# Patient Record
Sex: FEMALE | Race: WHITE | NOT HISPANIC OR LATINO | Employment: STUDENT | URBAN - METROPOLITAN AREA
[De-identification: names, ages, dates, MRNs, and addresses within clinical notes are randomized per-mention and may not be internally consistent; named-entity substitution may affect disease eponyms.]

---

## 2017-10-04 ENCOUNTER — HOSPITAL ENCOUNTER (OUTPATIENT)
Dept: LAB | Facility: MEDICAL CENTER | Age: 22
End: 2017-10-04
Payer: COMMERCIAL

## 2017-10-04 LAB
ALBUMIN SERPL BCP-MCNC: 4.2 G/DL (ref 3.2–4.9)
ALBUMIN/GLOB SERPL: 1.3 G/DL
ALP SERPL-CCNC: 33 U/L (ref 30–99)
ALT SERPL-CCNC: 7 U/L (ref 2–50)
ANION GAP SERPL CALC-SCNC: 10 MMOL/L (ref 0–11.9)
AST SERPL-CCNC: 12 U/L (ref 12–45)
BASOPHILS # BLD AUTO: 0.6 % (ref 0–1.8)
BASOPHILS # BLD: 0.03 K/UL (ref 0–0.12)
BILIRUB SERPL-MCNC: 0.5 MG/DL (ref 0.1–1.5)
BUN SERPL-MCNC: 10 MG/DL (ref 8–22)
CALCIUM SERPL-MCNC: 9.6 MG/DL (ref 8.5–10.5)
CHLORIDE SERPL-SCNC: 107 MMOL/L (ref 96–112)
CO2 SERPL-SCNC: 21 MMOL/L (ref 20–33)
CREAT SERPL-MCNC: 0.62 MG/DL (ref 0.5–1.4)
EOSINOPHIL # BLD AUTO: 0.16 K/UL (ref 0–0.51)
EOSINOPHIL NFR BLD: 3.2 % (ref 0–6.9)
ERYTHROCYTE [DISTWIDTH] IN BLOOD BY AUTOMATED COUNT: 43 FL (ref 35.9–50)
GFR SERPL CREATININE-BSD FRML MDRD: >60 ML/MIN/1.73 M 2
GLOBULIN SER CALC-MCNC: 3.2 G/DL (ref 1.9–3.5)
GLUCOSE SERPL-MCNC: 90 MG/DL (ref 65–99)
HCT VFR BLD AUTO: 43 % (ref 37–47)
HGB BLD-MCNC: 14.2 G/DL (ref 12–16)
IMM GRANULOCYTES # BLD AUTO: 0.01 K/UL (ref 0–0.11)
IMM GRANULOCYTES NFR BLD AUTO: 0.2 % (ref 0–0.9)
LYMPHOCYTES # BLD AUTO: 2.34 K/UL (ref 1–4.8)
LYMPHOCYTES NFR BLD: 47.1 % (ref 22–41)
MCH RBC QN AUTO: 29.6 PG (ref 27–33)
MCHC RBC AUTO-ENTMCNC: 33 G/DL (ref 33.6–35)
MCV RBC AUTO: 89.8 FL (ref 81.4–97.8)
MONOCYTES # BLD AUTO: 0.46 K/UL (ref 0–0.85)
MONOCYTES NFR BLD AUTO: 9.3 % (ref 0–13.4)
NEUTROPHILS # BLD AUTO: 1.97 K/UL (ref 2–7.15)
NEUTROPHILS NFR BLD: 39.6 % (ref 44–72)
NRBC # BLD AUTO: 0 K/UL
NRBC BLD AUTO-RTO: 0 /100 WBC
PLATELET # BLD AUTO: 241 K/UL (ref 164–446)
PMV BLD AUTO: 11.5 FL (ref 9–12.9)
POTASSIUM SERPL-SCNC: 3.7 MMOL/L (ref 3.6–5.5)
PROT SERPL-MCNC: 7.4 G/DL (ref 6–8.2)
RBC # BLD AUTO: 4.79 M/UL (ref 4.2–5.4)
SODIUM SERPL-SCNC: 138 MMOL/L (ref 135–145)
TSH SERPL DL<=0.005 MIU/L-ACNC: 2.35 UIU/ML (ref 0.3–3.7)
WBC # BLD AUTO: 5 K/UL (ref 4.8–10.8)

## 2017-10-04 PROCEDURE — 84443 ASSAY THYROID STIM HORMONE: CPT

## 2017-10-04 PROCEDURE — 36415 COLL VENOUS BLD VENIPUNCTURE: CPT

## 2017-10-04 PROCEDURE — 80053 COMPREHEN METABOLIC PANEL: CPT

## 2017-10-04 PROCEDURE — 85025 COMPLETE CBC W/AUTO DIFF WBC: CPT

## 2017-10-09 ENCOUNTER — OFFICE VISIT (OUTPATIENT)
Dept: MEDICAL GROUP | Facility: MEDICAL CENTER | Age: 22
End: 2017-10-09
Payer: COMMERCIAL

## 2017-10-09 VITALS
HEIGHT: 69 IN | SYSTOLIC BLOOD PRESSURE: 110 MMHG | DIASTOLIC BLOOD PRESSURE: 68 MMHG | OXYGEN SATURATION: 99 % | HEART RATE: 66 BPM | BODY MASS INDEX: 16.44 KG/M2 | WEIGHT: 111 LBS | RESPIRATION RATE: 16 BRPM | TEMPERATURE: 98.5 F

## 2017-10-09 DIAGNOSIS — L65.9 HAIR LOSS: ICD-10-CM

## 2017-10-09 DIAGNOSIS — B07.0 PLANTAR WART OF LEFT FOOT: ICD-10-CM

## 2017-10-09 DIAGNOSIS — Z91.09 ENVIRONMENTAL ALLERGIES: ICD-10-CM

## 2017-10-09 DIAGNOSIS — R42 POSTURAL DIZZINESS WITH NEAR SYNCOPE: Primary | ICD-10-CM

## 2017-10-09 DIAGNOSIS — R55 POSTURAL DIZZINESS WITH NEAR SYNCOPE: Primary | ICD-10-CM

## 2017-10-09 DIAGNOSIS — F90.2 ATTENTION DEFICIT HYPERACTIVITY DISORDER (ADHD), COMBINED TYPE: ICD-10-CM

## 2017-10-09 DIAGNOSIS — R29.91 MARFANOID HABITUS: ICD-10-CM

## 2017-10-09 DIAGNOSIS — I49.1 PAC (PREMATURE ATRIAL CONTRACTION): ICD-10-CM

## 2017-10-09 DIAGNOSIS — Z71.85 VACCINE COUNSELING: ICD-10-CM

## 2017-10-09 PROCEDURE — 90651 9VHPV VACCINE 2/3 DOSE IM: CPT | Performed by: FAMILY MEDICINE

## 2017-10-09 PROCEDURE — 93000 ELECTROCARDIOGRAM COMPLETE: CPT | Performed by: FAMILY MEDICINE

## 2017-10-09 PROCEDURE — 90471 IMMUNIZATION ADMIN: CPT | Performed by: FAMILY MEDICINE

## 2017-10-09 PROCEDURE — 99214 OFFICE O/P EST MOD 30 MIN: CPT | Mod: 25 | Performed by: FAMILY MEDICINE

## 2017-10-09 RX ORDER — DEXTROAMPHETAMINE SACCHARATE, AMPHETAMINE ASPARTATE, DEXTROAMPHETAMINE SULFATE AND AMPHETAMINE SULFATE 2.5; 2.5; 2.5; 2.5 MG/1; MG/1; MG/1; MG/1
5 TABLET ORAL 2 TIMES DAILY
Qty: 60 TAB | Refills: 0 | Status: SHIPPED | OUTPATIENT
Start: 2017-11-09 | End: 2017-10-09 | Stop reason: SDUPTHER

## 2017-10-09 RX ORDER — DEXTROAMPHETAMINE SACCHARATE, AMPHETAMINE ASPARTATE, DEXTROAMPHETAMINE SULFATE AND AMPHETAMINE SULFATE 2.5; 2.5; 2.5; 2.5 MG/1; MG/1; MG/1; MG/1
5 TABLET ORAL 2 TIMES DAILY
Qty: 60 TAB | Refills: 0 | Status: SHIPPED | OUTPATIENT
Start: 2017-10-09 | End: 2017-10-09 | Stop reason: SDUPTHER

## 2017-10-09 RX ORDER — FLUTICASONE PROPIONATE 50 MCG
1 SPRAY, SUSPENSION (ML) NASAL DAILY
Qty: 16 G | Refills: 3 | Status: SHIPPED | OUTPATIENT
Start: 2017-10-09 | End: 2017-11-13

## 2017-10-09 RX ORDER — DEXTROAMPHETAMINE SACCHARATE, AMPHETAMINE ASPARTATE, DEXTROAMPHETAMINE SULFATE AND AMPHETAMINE SULFATE 2.5; 2.5; 2.5; 2.5 MG/1; MG/1; MG/1; MG/1
5 TABLET ORAL 2 TIMES DAILY
Qty: 60 TAB | Refills: 0 | Status: SHIPPED | OUTPATIENT
Start: 2017-12-09 | End: 2017-11-13 | Stop reason: SDUPTHER

## 2017-10-09 RX ORDER — NORETHINDRONE ACETATE AND ETHINYL ESTRADIOL .02; 1 MG/1; MG/1
1 TABLET ORAL DAILY
COMMUNITY
End: 2018-01-08 | Stop reason: SDUPTHER

## 2017-10-09 ASSESSMENT — PATIENT HEALTH QUESTIONNAIRE - PHQ9
CLINICAL INTERPRETATION OF PHQ2 SCORE: 2
5. POOR APPETITE OR OVEREATING: 2 - MORE THAN HALF THE DAYS
SUM OF ALL RESPONSES TO PHQ QUESTIONS 1-9: 12

## 2017-10-10 NOTE — PROGRESS NOTES
"This medical record contains text that has been entered with the assistance of computer voice recognition and dictation software.  Therefore, it may contain unintended errors in text, spelling, punctuation, or grammar        Chief Complaint   Patient presents with   • Establish Care     Dizziness with SOB x 1-2 yrs off/on but for the past 3-4 mo worsend        Pt. with HX: Syncope about 1-2 mo ago   • Other     Will need clearancy for a surgery scheudle 12/2017.   Pt. will undergo a skene gland cyst removal by Dr. Phani Vanessa Brett Rey is a 21 y.o. 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  Mostly because job is understaffed and she has been working more than full time hours.  She was recently promoted to part time so now she will have PTO and benefits   Lives with roommate  She has a girlfriend x 2 mo  She has a dog  She is contemplative of starting yoga      She has had a problem with \"frequent presyncope\" her whole life starting at age 12 but has been coming more frequent recently.  Will get presyncope symptoms of nausea and light headedness tunnel vision and will resolve her symptoms by drinking fluids electrolytes and sitting down to rest.  She has these symptoms a lot with blood draw.  She has these symptoms she stands up to fast or when she has been \"at the mall shopping all day and not drinking enough water or eating enough\"  Ros  No chest pain   No palpitations  No lower extremity sweling   No difficulty breathing   No true syncope  No headaches  No focal neuro symptoms      Current Outpatient Prescriptions   Medication Sig Dispense Refill   • norethindrone-ethinyl estradiol (JUNEL 1/20) 1-20 MG-MCG per tablet Take 1 Tab by mouth every " "day.     • [START ON 12/9/2017] amphetamine-dextroamphetamine (ADDERALL) 10 MG Tab Take 0.5 Tabs by mouth 2 times a day. 60 Tab 0   • fluticasone (FLONASE) 50 MCG/ACT nasal spray Spray 1 Spray in nose every day. 16 g 3   • albuterol (VENTOLIN OR PROVENTIL) 108 (90 BASE) MCG/ACT AERS inhalation aerosol Inhale 2 Puffs by mouth every four hours as needed. 1 Inhaler 0   • fluticasone (FLONASE) 50 MCG/ACT nasal spray Spray 1 Spray in nose every day. 16 g 1   • acetaminophen (TYLENOL) 500 MG TABS Take 500-1,000 mg by mouth every 6 hours as needed.       No current facility-administered medications for this visit.      Patient Active Problem List    Diagnosis Date Noted   • Hair loss 10/09/2017   • Postural dizziness with near syncope 10/09/2017   • Marfanoid habitus 10/09/2017   • Attention deficit hyperactivity disorder (ADHD), combined type 10/09/2017   • Environmental allergies 10/09/2017   • Plantar wart of left foot 10/09/2017   • PAC (premature atrial contraction) 10/09/2017     No past surgical history on file.   Social History   Substance Use Topics   • Smoking status: Current Every Day Smoker     Packs/day: 0.25     Years: 1.00     Types: Cigarettes   • Smokeless tobacco: Never Used   • Alcohol use Yes      Comment: Occ     Family History   Problem Relation Age of Onset   • Cancer Mother      Thyroid CA   • Hypertension Father            ROS      All other systems reviewed and are negative     Objective:     Blood pressure 110/68, pulse 66, temperature 36.9 °C (98.5 °F), resp. rate 16, height 1.759 m (5' 9.25\"), weight 50.3 kg (111 lb), last menstrual period 07/09/2017, SpO2 99 %. Body mass index is 16.27 kg/m².  Physical Exam:    Constitutional: Alert, no distress.  Skin: Warm, dry, good turgor, no rashes in visible areas.  Eye: Equal, round and reactive, conjunctiva clear, lids normal.  ENMT: Lips without lesions, good dentition, oropharynx clear.  Neck: Trachea midline, no masses, no thyromegaly. No cervical " or supraclavicular lymphadenopathy.  Respiratory: Unlabored respiratory effort, lungs clear to auscultation, no wheezes, no ronchi.  Cardiovascular: Normal S1, S2, no murmur, no edema.  Abdomen: Soft, non-tender, no masses, no hepatosplenomegaly.  Psych: Alert and oriented x3, normal affect and mood.  Neuro:   CN 2-12 ITBL, no focal facial asymmetries, strength 5/5 in all four extremities, nl and symmetric reflex all four extremities, sensation grossly intact throughout.  Cerebellar testing wnl.  Gait steady and normal.      EKG  Per my READ NSR, no e/o acute ischemia, nl AR interval and good conduction. Single PAC        Assessment and Plan:   The following treatment plan was discussed        Problem List Items Addressed This Visit     Hair loss     Just had complete nl set of labs with ob/gyn,  I explain in the setting where we do not identify an organic cause we find that stress is the #1 contributor.  She has had a lot of stress in her life recently with work.  She has a history of anxiety disorder and panic attack and would like to avoid medication.  I encourage regular exercise (yoga) and she is working towards a better work life balance. Switching from [employer] to part time             Relevant Orders    BASIC METABOLIC PANEL    CBC WITH DIFFERENTIAL    IRON/TOTAL IRON BIND    TSH+FREE T4    Postural dizziness with near syncope - Primary     No red flags.  Pt is having 10 year history of near syncope which seems to be worse with prolonged standing,  fasting, severe emotion, or pain.  I suspect simple vasovagal near syncope as the cause and we discuss preventative measures.  Since she has marfanoid habitus we will do complete work up with labs (states she just had nl labs with ob) EKG today in clinic and echo.           Relevant Orders    ECHOCARDIOGRAM COMP W/O CONT    Marfanoid habitus    Relevant Orders    ECHOCARDIOGRAM COMP W/O CONT    BASIC METABOLIC PANEL    Attention deficit hyperactivity disorder  (ADHD), combined type     She has had success with adderall in the past and would like to resume, risk benefit side effect discussed.  I encourage her to limit to 5 days per week for weekend holiday to avoid tolerance.  Follow up q3 mo as this is controlled substance.                 Environmental allergies     Poorly controlled, uses antiH prn triggers are fall pollens, molds, dust mites and cats. Start flonase         Plantar wart of left foot    Relevant Orders    REFERRAL TO PODIATRY    PAC (premature atrial contraction)     See postural dizziness with near syncope  She is getting echo  Follow up 1 mo                  Other Visit Diagnoses     Vaccine counseling        Relevant Orders    GARDASIL 9            HEALTH MAINTENANCE: HPV #1 today, had flu shot already     Instructed to follow up if symptoms worsen or fail to improve, ER/UC precautions discussed as well    Followup: No Follow-up on file.      Over 45 minutes spent with patient face to face, greater than 50% time spent with plan/cordination of care       Kathe Dunaway MD  [facility], Family Medicine   [address] [place] [address]   [place] [id number]  Phone: [phone]

## 2017-10-10 NOTE — ASSESSMENT & PLAN NOTE
She has had success with adderall in the past and would like to resume, risk benefit side effect discussed.  I encourage her to limit to 5 days per week for weekend holiday to avoid tolerance.  Follow up q3 mo as this is controlled substance.

## 2017-10-10 NOTE — ASSESSMENT & PLAN NOTE
No red flags.  Pt is having 10 year history of near syncope which seems to be worse with prolonged standing,  fasting, severe emotion, or pain.  I suspect simple vasovagal near syncope as the cause and we discuss preventative measures.  Since she has marfanoid habitus we will do complete work up with labs (states she just had nl labs with ob) EKG today in clinic and echo.

## 2017-10-10 NOTE — ASSESSMENT & PLAN NOTE
Just had complete nl set of labs with ob/gyn,  I explain in the setting where we do not identify an organic cause we find that stress is the #1 contributor.  She has had a lot of stress in her life recently with work.  She has a history of anxiety disorder and panic attack and would like to avoid medication.  I encourage regular exercise (yoga) and she is working towards a better work life balance. Switching from per [employer] to part time

## 2017-10-10 NOTE — ASSESSMENT & PLAN NOTE
Poorly controlled, uses antiH prn triggers are fall pollens, molds, dust mites and cats. Start flonase

## 2017-10-12 ENCOUNTER — HOSPITAL ENCOUNTER (OUTPATIENT)
Dept: CARDIOLOGY | Facility: MEDICAL CENTER | Age: 22
End: 2017-10-12
Attending: FAMILY MEDICINE
Payer: COMMERCIAL

## 2017-10-12 LAB
LV EJECT FRACT  99904: 65
LV EJECT FRACT MOD 2C 99903: 73.66
LV EJECT FRACT MOD 4C 99902: 56.41
LV EJECT FRACT MOD BP 99901: 67.11

## 2017-10-12 PROCEDURE — 93306 TTE W/DOPPLER COMPLETE: CPT | Mod: 26 | Performed by: INTERNAL MEDICINE

## 2017-10-12 PROCEDURE — 93306 TTE W/DOPPLER COMPLETE: CPT

## 2017-10-13 ENCOUNTER — TELEPHONE (OUTPATIENT)
Dept: MEDICAL GROUP | Facility: MEDICAL CENTER | Age: 22
End: 2017-10-13

## 2017-10-13 NOTE — TELEPHONE ENCOUNTER
----- Message from Kathe Dunaway M.D. sent at 10/13/2017 10:53 AM PDT -----  please let [facility] know that I reviewed her echo and her heart looks FANTASTIC! No change in management plan, liberalize salt in diet, adequate hydration, regular exercise, regular sleep, small regular meals

## 2017-11-06 ENCOUNTER — TELEPHONE (OUTPATIENT)
Dept: MEDICAL GROUP | Facility: MEDICAL CENTER | Age: 22
End: 2017-11-06

## 2017-11-06 DIAGNOSIS — R68.84 JAW PAIN: ICD-10-CM

## 2017-11-06 NOTE — TELEPHONE ENCOUNTER
----- Message from Katharina Pelayo Med Ass't sent at 11/6/2017  1:02 PM PST -----  Regarding: FW: wisdom teeth out      ----- Message -----  From: Katharina Pelayo Med Ass't  Sent: 11/6/2017   1:00 PM  To: Katharina Pelayo Med Ass't  Subject: wisdom teeth out                                 Dr. Dunaway    Could you see pt in order to determine if she needs to get her wisdom teeth out or should she just see her Dentist?  Pt prefers to see ya first.       Katharina :-)

## 2017-11-06 NOTE — TELEPHONE ENCOUNTER
We can do panoramic mandible  x ray to see if she has wisdom teeth, I ordered x ray,  pt to schedule appointment with me to review X ray

## 2017-11-13 ENCOUNTER — OFFICE VISIT (OUTPATIENT)
Dept: MEDICAL GROUP | Facility: MEDICAL CENTER | Age: 22
End: 2017-11-13
Payer: COMMERCIAL

## 2017-11-13 VITALS
BODY MASS INDEX: 15.93 KG/M2 | OXYGEN SATURATION: 100 % | TEMPERATURE: 99.8 F | RESPIRATION RATE: 16 BRPM | WEIGHT: 107.58 LBS | SYSTOLIC BLOOD PRESSURE: 100 MMHG | HEART RATE: 89 BPM | DIASTOLIC BLOOD PRESSURE: 60 MMHG | HEIGHT: 69 IN

## 2017-11-13 DIAGNOSIS — F50.02 ANOREXIA NERVOSA WITH BULIMIA: ICD-10-CM

## 2017-11-13 DIAGNOSIS — F41.9 ANXIETY: ICD-10-CM

## 2017-11-13 DIAGNOSIS — R55 POSTURAL DIZZINESS WITH NEAR SYNCOPE: ICD-10-CM

## 2017-11-13 DIAGNOSIS — L21.9 SEBORRHEIC DERMATITIS OF SCALP: ICD-10-CM

## 2017-11-13 DIAGNOSIS — R42 POSTURAL DIZZINESS WITH NEAR SYNCOPE: ICD-10-CM

## 2017-11-13 DIAGNOSIS — R11.0 NAUSEA: Primary | ICD-10-CM

## 2017-11-13 DIAGNOSIS — F33.2 SEVERE EPISODE OF RECURRENT MAJOR DEPRESSIVE DISORDER, WITHOUT PSYCHOTIC FEATURES (HCC): ICD-10-CM

## 2017-11-13 PROBLEM — F50.029 ANOREXIA NERVOSA WITH BULIMIA: Status: ACTIVE | Noted: 2017-11-13

## 2017-11-13 PROBLEM — R29.91 MARFANOID HABITUS: Status: RESOLVED | Noted: 2017-10-09 | Resolved: 2017-11-13

## 2017-11-13 PROCEDURE — 90471 IMMUNIZATION ADMIN: CPT | Performed by: FAMILY MEDICINE

## 2017-11-13 PROCEDURE — 99214 OFFICE O/P EST MOD 30 MIN: CPT | Mod: 25 | Performed by: FAMILY MEDICINE

## 2017-11-13 PROCEDURE — 90651 9VHPV VACCINE 2/3 DOSE IM: CPT | Performed by: FAMILY MEDICINE

## 2017-11-13 RX ORDER — CALCIUM CARBONATE 500 MG/1
500 TABLET, CHEWABLE ORAL DAILY
Qty: 30 TAB | Refills: 3 | Status: SHIPPED | OUTPATIENT
Start: 2017-11-13 | End: 2017-12-12

## 2017-11-13 RX ORDER — DEXTROAMPHETAMINE SACCHARATE, AMPHETAMINE ASPARTATE, DEXTROAMPHETAMINE SULFATE AND AMPHETAMINE SULFATE 2.5; 2.5; 2.5; 2.5 MG/1; MG/1; MG/1; MG/1
5 TABLET ORAL 2 TIMES DAILY
Qty: 60 TAB | Refills: 0 | Status: SHIPPED | OUTPATIENT
Start: 2017-12-09 | End: 2017-11-13

## 2017-11-13 RX ORDER — OMEPRAZOLE 20 MG/1
20 CAPSULE, DELAYED RELEASE ORAL DAILY
Qty: 30 CAP | Refills: 3 | Status: ON HOLD | OUTPATIENT
Start: 2017-11-13 | End: 2017-12-13

## 2017-11-13 ASSESSMENT — PATIENT HEALTH QUESTIONNAIRE - PHQ9
SUM OF ALL RESPONSES TO PHQ QUESTIONS 1-9: 17
CLINICAL INTERPRETATION OF PHQ2 SCORE: 4
5. POOR APPETITE OR OVEREATING: 3 - NEARLY EVERY DAY

## 2017-11-13 NOTE — LETTER
November 13, 2017       Patient: St. Kelsea Rey   YOB: 1995   Date of Visit: 11/13/2017         To Whom It May Concern:    Cleared from work 12/13/2017-12/20/2017.      If you have any questions or concerns, please don't hesitate to call [phone]          Sincerely,          Kathe Dunaway M.D.  Electronically Signed

## 2017-11-14 NOTE — ASSESSMENT & PLAN NOTE
See disordered eating  Treating with SSRI and referral to behavioral health for CBT  Follow up 2 weeks  Risk/benefit side effect of zoloft discussed

## 2017-11-14 NOTE — ASSESSMENT & PLAN NOTE
Consider gastritis 2/2 stress  (mom with cancer relapse - thyroid- going to [facility], she is also working over time)   Will exclude organic cause H pylori and do ppi trial

## 2017-11-14 NOTE — PROGRESS NOTES
This medical record contains text that has been entered with the assistance of computer voice recognition and dictation software.  Therefore, it may contain unintended errors in text, spelling, punctuation, or grammar        Chief Complaint   Patient presents with   • Dental Pain     lower bilat jaw tooth pressure like sensation   • Other     Discuss medications.       St. Kelsea Rey is a 21 y.o. female here evaluation and management of: weight loss, mental health, medication questions       HPI:     See assessment plan  This is our second encounter with pt   nausea x 1 mo, will restrict for days, and then will have occasional episodes of binge/purge   Read about mirtazapine for weight gain   After some discussion: history of disordered eating and mental health   Also with depression and anxiety and compulsion     Current Outpatient Prescriptions   Medication Sig Dispense Refill   • omeprazole (PRILOSEC) 20 MG delayed-release capsule Take 1 Cap by mouth every day. 30 Cap 3   • calcium carbonate (TUMS) 500 MG Chew Tab Take 1 Tab by mouth every day. 30 Tab 3   • sertraline (ZOLOFT) 50 MG Tab Take 1 Tab by mouth every day. 30 Tab 11   • norethindrone-ethinyl estradiol (JUNEL 1/20) 1-20 MG-MCG per tablet Take 1 Tab by mouth every day.     • albuterol (VENTOLIN OR PROVENTIL) 108 (90 BASE) MCG/ACT AERS inhalation aerosol Inhale 2 Puffs by mouth every four hours as needed. 1 Inhaler 0   • fluticasone (FLONASE) 50 MCG/ACT nasal spray Spray 1 Spray in nose every day. 16 g 1   • acetaminophen (TYLENOL) 500 MG TABS Take 500-1,000 mg by mouth every 6 hours as needed.       No current facility-administered medications for this visit.      Patient Active Problem List    Diagnosis Date Noted   • Seborrheic dermatitis of scalp 11/13/2017   • Nausea 11/13/2017   • Anxiety 11/13/2017   • Severe episode of recurrent major depressive disorder, without psychotic features (CMS-HCC) 11/13/2017   • Anorexia nervosa with  "bulimia 11/13/2017   • Hair loss 10/09/2017   • Postural dizziness with near syncope 10/09/2017   • Attention deficit hyperactivity disorder (ADHD), combined type 10/09/2017   • Environmental allergies 10/09/2017   • Plantar wart of left foot 10/09/2017   • PAC (premature atrial contraction) 10/09/2017     No past surgical history on file.   Social History   Substance Use Topics   • Smoking status: Current Every Day Smoker     Packs/day: 0.25     Years: 1.00     Types: Cigarettes   • Smokeless tobacco: Never Used   • Alcohol use Yes      Comment: Occ     Family History   Problem Relation Age of Onset   • Cancer Mother      Thyroid CA   • Hypertension Father            ROS    See HPI  + nausea  + lack of hunger  Neg for chest pain, neg for headache, neg for syncope  All other systems reviewed and are negative     Objective:     Blood pressure 100/60, pulse 89, temperature 37.7 °C (99.8 °F), resp. rate 16, height 1.759 m (5' 9.25\"), weight 48.8 kg (107 lb 9.4 oz), SpO2 100 %. Body mass index is 15.77 kg/m².  Physical Exam:        GEN: comfortable, alert and oriented, well nourished, well developed, in no apparent distress   HEENT: NCAT, eyes: pupils equal and reactive, sclera white, EOMIT, good dentition  HEART: limbs warm and well perfused, regular rate, no JVD, no lower extremity edema  LUNGS: speaking in full sentences, not in apparent respiratory distress, no audible wheezes  MSK: normal tone and bulk, no swelling of the joints, gait steady and normal           Assessment and Plan:   The following treatment plan was discussed        Problem List Items Addressed This Visit     Postural dizziness with near syncope     Echo and labs unremarkable  Improved with stimulant supporting dx of pots, unfortunately, we are going to wean/use caution with adderall given her comorbidity of anorexia nervosa          Seborrheic dermatitis of scalp     Advise use otc anti-dandruff shampoo to treat the organic component  There is " also likely a psychosomatic component          Nausea - Primary     Consider gastritis 2/2 stress  (mom with cancer relapse - thyroid- going to [facility], she is also working over time)   Will exclude organic cause H pylori and do ppi trial                Relevant Medications    omeprazole (PRILOSEC) 20 MG delayed-release capsule    calcium carbonate (TUMS) 500 MG Chew Tab    Other Relevant Orders    H.PYLORI STOOL ANTIGEN    Anxiety    Relevant Medications    sertraline (ZOLOFT) 50 MG Tab    Other Relevant Orders    REFERRAL TO BEHAVIORAL HEALTH    Severe episode of recurrent major depressive disorder, without psychotic features (CMS-HCC)     See disordered eating  Treating with SSRI and referral to behavioral health for CBT  Follow up 2 weeks  Risk/benefit side effect of zoloft discussed               Relevant Medications    sertraline (ZOLOFT) 50 MG Tab    Other Relevant Orders    REFERRAL TO BEHAVIORAL HEALTH    Anorexia nervosa with bulimia     This was a problem for her in [employer] and high school, she has relapse with stress  She is interested in trial of CBT again (referral to behavioral health)  She is aware that stimmulent is appetite suppressant and contraindicated   She will wean off   Start SSRI  Follow up 2 weeks               Relevant Orders    REFERRAL TO BEHAVIORAL HEALTH    GARDASIL 9 (Completed)      Other Visit Diagnoses    None.         Over 25 minutes spent with patient face to face, greater than 50% time spent with plan/cordination of care         Instructed to follow up if symptoms worsen or fail to improve, ER/UC precautions discussed as well    Kathe Dunaway MD  [facility], Family Medicine   [address] [place], [place] [address]   [place] [address]  Phone: [phone]

## 2017-11-14 NOTE — ASSESSMENT & PLAN NOTE
Echo and labs unremarkable  Improved with stimulant supporting dx of pots, unfortunately, we are going to wean/use caution with adderall given her comorbidity of anorexia nervosa

## 2017-11-14 NOTE — ASSESSMENT & PLAN NOTE
Advise use otc anti-dandruff shampoo to treat the organic component  There is also likely a psychosomatic component

## 2017-11-14 NOTE — ASSESSMENT & PLAN NOTE
This was a problem for her in [employer] and high school, she has relapse with stress  She is interested in trial of CBT again (referral to behavioral health)  She is aware that stimmulent is appetite suppressant and contraindicated   She will wean off   Start SSRI  Follow up 2 weeks

## 2017-12-12 DIAGNOSIS — Z01.812 PRE-OPERATIVE LABORATORY EXAMINATION: ICD-10-CM

## 2017-12-12 LAB
BASOPHILS # BLD AUTO: 0.6 % (ref 0–1.8)
BASOPHILS # BLD: 0.03 K/UL (ref 0–0.12)
EOSINOPHIL # BLD AUTO: 0.15 K/UL (ref 0–0.51)
EOSINOPHIL NFR BLD: 2.9 % (ref 0–6.9)
ERYTHROCYTE [DISTWIDTH] IN BLOOD BY AUTOMATED COUNT: 42.5 FL (ref 35.9–50)
HCG SERPL QL: NEGATIVE
HCT VFR BLD AUTO: 43.4 % (ref 37–47)
HGB BLD-MCNC: 14.5 G/DL (ref 12–16)
IMM GRANULOCYTES # BLD AUTO: 0.01 K/UL (ref 0–0.11)
IMM GRANULOCYTES NFR BLD AUTO: 0.2 % (ref 0–0.9)
LYMPHOCYTES # BLD AUTO: 2.46 K/UL (ref 1–4.8)
LYMPHOCYTES NFR BLD: 47.3 % (ref 22–41)
MCH RBC QN AUTO: 29.8 PG (ref 27–33)
MCHC RBC AUTO-ENTMCNC: 33.4 G/DL (ref 33.6–35)
MCV RBC AUTO: 89.3 FL (ref 81.4–97.8)
MONOCYTES # BLD AUTO: 0.42 K/UL (ref 0–0.85)
MONOCYTES NFR BLD AUTO: 8.1 % (ref 0–13.4)
NEUTROPHILS # BLD AUTO: 2.13 K/UL (ref 2–7.15)
NEUTROPHILS NFR BLD: 40.9 % (ref 44–72)
NRBC # BLD AUTO: 0 K/UL
NRBC BLD AUTO-RTO: 0 /100 WBC
PLATELET # BLD AUTO: 253 K/UL (ref 164–446)
PMV BLD AUTO: 11.4 FL (ref 9–12.9)
RBC # BLD AUTO: 4.86 M/UL (ref 4.2–5.4)
WBC # BLD AUTO: 5.2 K/UL (ref 4.8–10.8)

## 2017-12-12 PROCEDURE — 84703 CHORIONIC GONADOTROPIN ASSAY: CPT

## 2017-12-12 PROCEDURE — 85025 COMPLETE CBC W/AUTO DIFF WBC: CPT

## 2017-12-12 PROCEDURE — 36415 COLL VENOUS BLD VENIPUNCTURE: CPT

## 2017-12-13 ENCOUNTER — HOSPITAL ENCOUNTER (OUTPATIENT)
Facility: MEDICAL CENTER | Age: 22
End: 2017-12-13
Attending: OBSTETRICS & GYNECOLOGY | Admitting: OBSTETRICS & GYNECOLOGY
Payer: COMMERCIAL

## 2017-12-13 VITALS
TEMPERATURE: 97.6 F | HEIGHT: 70 IN | DIASTOLIC BLOOD PRESSURE: 59 MMHG | BODY MASS INDEX: 14.99 KG/M2 | SYSTOLIC BLOOD PRESSURE: 104 MMHG | RESPIRATION RATE: 18 BRPM | HEART RATE: 106 BPM | OXYGEN SATURATION: 98 % | WEIGHT: 104.72 LBS

## 2017-12-13 PROCEDURE — 160009 HCHG ANES TIME/MIN: Performed by: OBSTETRICS & GYNECOLOGY

## 2017-12-13 PROCEDURE — 160035 HCHG PACU - 1ST 60 MINS PHASE I: Performed by: OBSTETRICS & GYNECOLOGY

## 2017-12-13 PROCEDURE — A9270 NON-COVERED ITEM OR SERVICE: HCPCS

## 2017-12-13 PROCEDURE — 700101 HCHG RX REV CODE 250

## 2017-12-13 PROCEDURE — 700102 HCHG RX REV CODE 250 W/ 637 OVERRIDE(OP)

## 2017-12-13 PROCEDURE — 160047 HCHG PACU  - EA ADDL 30 MINS PHASE II: Performed by: OBSTETRICS & GYNECOLOGY

## 2017-12-13 PROCEDURE — 502587 HCHG PACK, D&C: Performed by: OBSTETRICS & GYNECOLOGY

## 2017-12-13 PROCEDURE — 700111 HCHG RX REV CODE 636 W/ 250 OVERRIDE (IP)

## 2017-12-13 PROCEDURE — 160039 HCHG SURGERY MINUTES - EA ADDL 1 MIN LEVEL 3: Performed by: OBSTETRICS & GYNECOLOGY

## 2017-12-13 PROCEDURE — 160028 HCHG SURGERY MINUTES - 1ST 30 MINS LEVEL 3: Performed by: OBSTETRICS & GYNECOLOGY

## 2017-12-13 PROCEDURE — 160036 HCHG PACU - EA ADDL 30 MINS PHASE I: Performed by: OBSTETRICS & GYNECOLOGY

## 2017-12-13 PROCEDURE — 501330 HCHG SET, CYSTO IRRIG TUBING: Performed by: OBSTETRICS & GYNECOLOGY

## 2017-12-13 PROCEDURE — 88305 TISSUE EXAM BY PATHOLOGIST: CPT

## 2017-12-13 PROCEDURE — A6404 STERILE GAUZE > 48 SQ IN: HCPCS | Performed by: OBSTETRICS & GYNECOLOGY

## 2017-12-13 PROCEDURE — 110454 HCHG SHELL REV 250: Performed by: OBSTETRICS & GYNECOLOGY

## 2017-12-13 PROCEDURE — 160048 HCHG OR STATISTICAL LEVEL 1-5: Performed by: OBSTETRICS & GYNECOLOGY

## 2017-12-13 PROCEDURE — 160025 RECOVERY II MINUTES (STATS): Performed by: OBSTETRICS & GYNECOLOGY

## 2017-12-13 PROCEDURE — A4346 CATH INDW FOLEY 3 WAY: HCPCS | Performed by: OBSTETRICS & GYNECOLOGY

## 2017-12-13 PROCEDURE — 160046 HCHG PACU - 1ST 60 MINS PHASE II: Performed by: OBSTETRICS & GYNECOLOGY

## 2017-12-13 PROCEDURE — 160002 HCHG RECOVERY MINUTES (STAT): Performed by: OBSTETRICS & GYNECOLOGY

## 2017-12-13 PROCEDURE — 501838 HCHG SUTURE GENERAL: Performed by: OBSTETRICS & GYNECOLOGY

## 2017-12-13 RX ORDER — LIDOCAINE HYDROCHLORIDE 10 MG/ML
INJECTION, SOLUTION INFILTRATION; PERINEURAL
Status: DISCONTINUED
Start: 2017-12-13 | End: 2017-12-13 | Stop reason: HOSPADM

## 2017-12-13 RX ORDER — LIDOCAINE AND PRILOCAINE 25; 25 MG/G; MG/G
1 CREAM TOPICAL
Status: DISCONTINUED | OUTPATIENT
Start: 2017-12-13 | End: 2017-12-13 | Stop reason: HOSPADM

## 2017-12-13 RX ORDER — ONDANSETRON 2 MG/ML
4 INJECTION INTRAMUSCULAR; INTRAVENOUS EVERY 6 HOURS PRN
Status: DISCONTINUED | OUTPATIENT
Start: 2017-12-13 | End: 2017-12-13 | Stop reason: HOSPADM

## 2017-12-13 RX ORDER — HYDROCODONE BITARTRATE AND ACETAMINOPHEN 5; 325 MG/1; MG/1
2 TABLET ORAL EVERY 6 HOURS PRN
Status: DISCONTINUED | OUTPATIENT
Start: 2017-12-13 | End: 2017-12-13 | Stop reason: HOSPADM

## 2017-12-13 RX ORDER — SODIUM CHLORIDE, SODIUM LACTATE, POTASSIUM CHLORIDE, CALCIUM CHLORIDE 600; 310; 30; 20 MG/100ML; MG/100ML; MG/100ML; MG/100ML
INJECTION, SOLUTION INTRAVENOUS CONTINUOUS
Status: DISCONTINUED | OUTPATIENT
Start: 2017-12-13 | End: 2017-12-13 | Stop reason: HOSPADM

## 2017-12-13 RX ORDER — LIDOCAINE HYDROCHLORIDE 10 MG/ML
0.5 INJECTION, SOLUTION INFILTRATION; PERINEURAL
Status: DISCONTINUED | OUTPATIENT
Start: 2017-12-13 | End: 2017-12-13 | Stop reason: HOSPADM

## 2017-12-13 RX ORDER — IBUPROFEN 200 MG
600 TABLET ORAL EVERY 6 HOURS PRN
Status: DISCONTINUED | OUTPATIENT
Start: 2017-12-13 | End: 2017-12-13 | Stop reason: HOSPADM

## 2017-12-13 RX ORDER — HYDROCODONE BITARTRATE AND ACETAMINOPHEN 5; 325 MG/1; MG/1
1 TABLET ORAL EVERY 4 HOURS PRN
Status: DISCONTINUED | OUTPATIENT
Start: 2017-12-13 | End: 2017-12-13 | Stop reason: HOSPADM

## 2017-12-13 RX ORDER — PROMETHAZINE HYDROCHLORIDE 25 MG/1
25 SUPPOSITORY RECTAL EVERY 4 HOURS PRN
Status: DISCONTINUED | OUTPATIENT
Start: 2017-12-13 | End: 2017-12-13 | Stop reason: HOSPADM

## 2017-12-13 RX ORDER — FLUTICASONE PROPIONATE 50 MCG
1 SPRAY, SUSPENSION (ML) NASAL
COMMUNITY
End: 2020-06-24 | Stop reason: SDUPTHER

## 2017-12-13 RX ORDER — BUPIVACAINE HYDROCHLORIDE AND EPINEPHRINE 2.5; 5 MG/ML; UG/ML
INJECTION, SOLUTION EPIDURAL; INFILTRATION; INTRACAUDAL; PERINEURAL
Status: DISCONTINUED | OUTPATIENT
Start: 2017-12-13 | End: 2017-12-13 | Stop reason: HOSPADM

## 2017-12-13 RX ADMIN — HYDROCODONE BITARTRATE AND ACETAMINOPHEN 15 ML: 2.5; 108 SOLUTION ORAL at 11:46

## 2017-12-13 ASSESSMENT — PAIN SCALES - GENERAL
PAINLEVEL_OUTOF10: 2
PAINLEVEL_OUTOF10: 0
PAINLEVEL_OUTOF10: 2
PAINLEVEL_OUTOF10: 4
PAINLEVEL_OUTOF10: 0

## 2017-12-13 NOTE — PROGRESS NOTES
Pt and pt's friend taught about leg bag and large bag, how to drain/clean/change. Pt c/o more pain, pt asked if she could have pain medication prior to d/c, pt educated about monitoring pt for at least 30 minutes post med administration, pt then declined pain medication. Pt's VSS, pt verbalized understanding of kunz care. Pt requested Dr. Jeronimo to call her, I called into the OR and left message with Audrey RODRIGUEZ to have Dr. Jeronimo give pt a call after her surgery.

## 2017-12-13 NOTE — OP REPORT
DATE OF SERVICE:  12/13/2017    PREOPERATIVE DIAGNOSES:  Presumed right [place]'s gland blockage and cyst,   frequent urinary tract infections, difficulty urinating with hesitancy.    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    PROCEDURE:  Removal of urethral diverticulum and closure and support of the   urethra distally, cystoscopy and urethroscopy exam under anesthesia.    SURGEON:  Luz Jeronimo MD    ASSISTANT:  COLLEEN Dye    ANESTHESIOLOGIST:  Marcelo Garcia MD    ANESTHESIA:  General endotracheal.    SPECIMEN:  Urethral cyst consistent with a diverticulum.    ESTIMATED BLOOD LOSS:  20 mL.    FINDINGS AT THE TIME OF SURGERY:  Exam under anesthesia reveals a 3 cm mass to   the right of the distal urethra without a connection recognized in the   urethra upon cystoscopy, but with dissection of the cystic mass, there was a   connection on the distal urethra on that right side.  The cyst was full of   sludge and possible watery fluid, consistent with urine in a diverticulum sac.    The cystoscopic findings postop was a normal bladder and normal urethra.    The connection was in the very distal urethra approximately 3 mm within the   urethral meatus.    COMPLICATIONS:  None.    TECHNIQUE:  The patient was taken to the operating room where general   anesthesia was placed.  She was then placed in the [facility] with   excellent positioning, prepped and draped in a normal sterile fashion.  The   cyst was identified and the vaginal mucosa overlying the cyst inside the   vagina was injected with 0.5% Marcaine with epinephrine and incision was made   with a scalpel.  The vaginal mucosa was dissected off of the cyst wall and   there appeared to be a connection on the medial aspect to the urethra.  I did   disconnect this and got into the urethra approximately 0.5 cm area that had   decent thickness to the  tissue around it.  I then cut the diverticulum off   leaving some tissue to support that urethral lift.  The specimen was sent as   [place]'s gland versus urethral diverticulum.  I then reapproximated the defect   in the distal urethra with 4-0 Vicryl and then did another layer on top of   this with 4-0 Vicryl as well.  There was also some periurethral tissue that I   was able to place together over this area as well, so in 3 layers, this area   was supported.  I then reapproximated the vaginal mucosa with 3-0 Vicryl in a   running fashion.  Excellent reapproximation was achieved.  I then looked again   with the cystoscope, the bladder appeared normal and the distal urethra was   unable to see any defect in this.  I then placed the Cleary catheter without   difficulty and leave this in for a week postoperatively.  The patient   tolerated the procedure very well and was brought to recovery room in stable   condition.       ____________________________________     MD MEE SALOMON / ERIC    DD:  12/13/2017 11:38:03  DT:  12/13/2017 11:56:59    D#:  [id number]  Job#:  [id number]

## 2017-12-13 NOTE — OR SURGEON
Immediate Post OP Note    PreOp Diagnosis: PRESUMED RIGHT SKENES GLAND, FREQUENT UTIS, DIFFICULTY URINATING WITH HESITANCY    PostOp Diagnosis: SAME WITH PROBABLE URETHRAL DIVERTICULUM INSTEAD OF A SKENES.    Procedure(s):  REMOVAL OF URETHRAL DIVERTICULUM AND CLOSURE AND SUPPORT OF URETHRA DISTALLY PERFORMED, CYSTOSCOPY AND URETHROSCOPY - Wound Class: Clean Contaminated    Surgeon(s):  Luz Jeronimo M.D.    Anesthesiologist/Type of Anesthesia:  Anesthesiologist: Marcelo Garcia M.D./General    Surgical Staff:  Assistant: Aron Ontiveros R.N.  Circulator: Cynthia Valdes R.N.  Relief Circulator: Rosalia Rodriguez R.N.  Scrub Person: Argenis Velázquez    Specimens:  Urethral cyst consistent with diverticulum    Estimated Blood Loss: 20cc    Findings: A 3 cm mass to the right of the distal urethra, no connection into the urethra was noted on cystoscopy but was with dissection of the cystic mass, the cyst was full of sulge and pus watery fluid consistent with urine, cystoscopic findings postop normal bladder and urethra,     Complications: None        12/13/2017 11:27 AM Luz Jeronimo

## 2017-12-13 NOTE — H&P
DATE OF OPERATION:  12/13/2017    CHIEF COMPLAINT:  Left Felt's gland with difficulty urinating.    HISTORY OF PRESENT ILLNESS:  The patient is a 22-year-old G0 who was seen at   [facility].  She was found to have a 3 cm cyst   consistent with a Felt's glands and the patient states that she feels like it   is getting bigger.  She gets lots of urinary tract infection.  She uses   cranberry pills as needed, sometimes has difficulty with her stream _____   hesitancy for the last several months.  Patient is interested in having this   removed.    PAST MEDICAL HISTORY:  Asthma, migraine headaches, heart problems and back   trouble, depression, anxiety.    PAST SURGICAL HISTORY:  None.    MENSTRUAL HISTORY:  The patient underwent menarche at age 14.  She has periods   every 4-6 months, last 2-18 days.    OBSTETRICAL HISTORY:  She has never been pregnant.    MEDICATIONS:  Include _____ and vitamin D.    FAMILY HISTORY:  Noncontributory.    SOCIAL HISTORY:  The patient smokes 1-5 cigarettes a day and has for 2 years.    She does not drink alcohol or do drugs.    ALLERGIES:  TO LATEX.    PHYSICAL EXAMINATION:  VITAL SIGNS:  The patient's blood pressure is 110/62.  Her weight is 110,   height is 5 feet 10 inches.  HEENT:  Within normal limits.  NECK:  Supple, without lymphadenopathy or thyromegaly.  CARDIOVASCULAR:  Regular rate and rhythm.  LUNGS:  Clear to auscultation.  BACK:  No CVA tenderness.  ABDOMEN:  Soft and nontender, nondistended.  No masses are palpable.  PELVIC:  RADHA appears to have a 3 cm cystic mass at the opening of the   urethra displacing the urethra to the left, nontender to palpation.  Uterus is   small.  There are no adnexal masses.  EXTREMITIES:  Benign.    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    PLAN:  The patient is scheduled for a  Franny's cyst removal versus   marsupialization.  Risks, benefits, alternatives and procedure were discussed   with patient in detail and she agrees to proceed.  Preoperative labs will be   obtained.  Preoperative antibiotics will be administered.  If she has any   problems or questions, she can contact my office.  She was given a   prescription for Norco 5/325, #40 and Zofran #20.       ____________________________________     MD MEE SALOMON / NTS    DD:  12/12/2017 21:06:11  DT:  12/12/2017 21:50:46    D#:  [id number]  Job#:  [id number]

## 2017-12-13 NOTE — DISCHARGE INSTRUCTIONS
ACTIVITY: Rest and take it easy for the first 24 hours.  A responsible adult is recommended to remain with you during that time.  It is normal to feel sleepy.  We encourage you to not do anything that requires balance, judgment or coordination.    MILD FLU-LIKE SYMPTOMS ARE NORMAL. YOU MAY EXPERIENCE GENERALIZED MUSCLE ACHES, THROAT IRRITATION, HEADACHE AND/OR SOME NAUSEA.    FOR 24 HOURS DO NOT:  Drive, operate machinery or run household appliances.  Drink beer or alcoholic beverages.   Make important decisions or sign legal documents.    SPECIAL INSTRUCTIONS: AKBAR FROM MY OFFICE WILL CALL PATIENT AND DISCUSS APPOINTMENT FOR REMOVAL IN 1 WEEK IN MY OFFICE.  Keep bowels soft and regular  No new diet restrictions  May use Colace over the counter 2 times daily as needed constipation  May use Milk of Magnesia as needed constipation    Cleary Catheter Care, Adult  A Cleary catheter is a soft, flexible tube. This tube is placed into your bladder to drain pee (urine). If you go home with this catheter in place, follow the instructions below.  TAKING CARE OF THE CATHETER  1. Wash your hands with soap and water.  2. Put soap and water on a clean washcloth.  ¨ Clean the skin where the tube goes into your body.  § Clean away from the tube site.  § Never wipe toward the tube.  § Clean the area using a circular motion.  ¨ Remove all the soap. Pat the area dry with a clean towel. For males, reposition the skin that covers the end of the penis (foreskin).  3. Attach the tube to your leg with tape or a leg strap. Do not stretch the tube tight. If you are using tape, remove any stickiness left behind by past tape you used.  4. Keep the drainage bag below your hips. Keep it off the floor.  5. Check your tube during the day. Make sure it is working and draining. Make sure the tube does not curl, twist, or bend.  6. Do not pull on the tube or try to take it out.  TAKING CARE OF THE DRAINAGE BAGS  You will have a large overnight  drainage bag and a small leg bag. You may wear the overnight bag any time. Never wear the small bag at night. Follow the directions below.  Emptying the Drainage Bag  Empty your drainage bag when it is  -½ full or at least 2-3 times a day.  1. Wash your hands with soap and water.  2. Keep the drainage bag below your hips.  3. Hold the dirty bag over the toilet or clean container.  4. Open the pour spout at the bottom of the bag. Empty the pee into the toilet or container. Do not let the pour spout touch anything.  5. Clean the pour spout with a gauze pad or cotton ball that has rubbing alcohol on it.  6. Close the pour spout.  7. Attach the bag to your leg with tape or a leg strap.  8. Wash your hands well.  Changing the Drainage Bag  Change your bag once a month or sooner if it starts to smell or look dirty.   1. Wash your hands with soap and water.  2. Pinch the rubber tube so that pee does not spill out.  3. Disconnect the catheter tube from the drainage tube at the connection valve. Do not let the tubes touch anything.  4. Clean the end of the catheter tube with an alcohol wipe. Clean the end of a the drainage tube with a different alcohol wipe.  5. Connect the catheter tube to the drainage tube of the clean drainage bag.  6. Attach the new bag to the leg with tape or a leg strap. Avoid attaching the new bag too tightly.  7. Wash your hands well.  Cleaning the Drainage Bag  1. Wash your hands with soap and water.  2. Wash the bag in warm, soapy water.  3. Rinse the bag with warm water.  4. Fill the bag with a mixture of white vinegar and water (1 cup vinegar to 1 quart warm water [.2 liter vinegar to 1 liter warm water]). Close the bag and soak it for 30 minutes in the solution.  5. Rinse the bag with warm water.  6. Hang the bag to dry with the pour spout open and hanging downward.  7. Store the clean bag (once it is dry) in a clean plastic bag.  8. Wash your hands well.  PREVENT INFECTION  · Wash your hands  before and after touching your tube.  · Take showers every day. Wash the skin where the tube enters your body. Do not take baths. Replace wet leg straps with dry ones, if this applies.  · Do not use powders, sprays, or lotions on the genital area. Only use creams, lotions, or ointments as told by your doctor.  · For females, wipe from front to back after going to the bathroom.  · Drink enough fluids to keep your pee clear or pale yellow unless you are told not to have too much fluid (fluid restriction).  · Do not let the drainage bag or tubing touch or lie on the floor.  · Wear cotton underwear to keep the area dry.  GET HELP IF:  · Your pee is cloudy or smells unusually bad.  · Your tube becomes clogged.  · You are not draining pee into the bag or your bladder feels full.  · Your tube starts to leak.  GET HELP RIGHT AWAY IF:  · You have pain, puffiness (swelling), redness, or yellowish-white fluid (pus) where the tube enters the body.  · You have pain in the belly (abdomen), legs, lower back, or bladder.  · You have a fever.  · You see blood fill the tube, or your pee is pink or red.  · You feel sick to your stomach (nauseous), throw up (vomit), or have chills.  · Your tube gets pulled out.    DIET: To avoid nausea, slowly advance diet as tolerated, avoiding spicy or greasy foods for the first day.  Add more substantial food to your diet according to your physician's instructions. INCREASE FLUIDS AND FIBER TO AVOID CONSTIPATION.    SURGICAL DRESSING/BATHING:  Baths Okay  May shower as desired    FOLLOW-UP APPOINTMENT:  A follow-up appointment should be arranged with your doctor; call to schedule.      You should call 911 if you develop problems with breathing or chest pain.  If you are unable to contact your doctor or surgical center, you should go to the nearest emergency room or urgent care center.  Physician's telephone #: *[phone]**    If any questions arise, call your doctor.  If your doctor is not  available, please feel free to call the Surgical Center at [phone].  The Center is open Monday through Friday from 7AM to 7PM.  You can also call the HEALTH HOTLINE open 24 hours/day, 7 days/week and speak to a nurse at [phone], or toll free at [phone].    A registered nurse may call you a few days after your surgery to see how you are doing after your procedure.    MEDICATIONS: Resume taking daily medication.  Take prescribed pain medication with food.  If no medication is prescribed, you may take non-aspirin pain medication if needed.  PAIN MEDICATION CAN BE VERY CONSTIPATING.  Take a stool softener or laxative such as senokot, pericolace, or milk of magnesia if needed.      If your physician has prescribed pain medication that includes Acetaminophen (Tylenol), do not take additional Acetaminophen (Tylenol) while taking the prescribed medication.    Depression / Suicide Risk    As you are discharged from this [facility] facility, it is important to learn how to keep safe from harming yourself.    Recognize the warning signs:  · Abrupt changes in personality, positive or negative- including increase in energy   · Giving away possessions  · Change in eating patterns- significant weight changes-  positive or negative  · Change in sleeping patterns- unable to sleep or sleeping all the time   · Unwillingness or inability to communicate  · Depression  · Unusual sadness, discouragement and loneliness  · Talk of wanting to die  · Neglect of personal appearance   · Rebelliousness- reckless behavior  · Withdrawal from people/activities they love  · Confusion- inability to concentrate     If you or a loved one observes any of these behaviors or has concerns about self-harm, here's what you can do:  · Talk about it- your feelings and reasons for harming yourself  · Remove any means that you might use to hurt yourself (examples: pills, rope, extension cords, firearm)  · Get professional help from the  community (Mental Health, Substance Abuse, psychological counseling)  · Do not be alone:Call your Safe Contact- someone whom you trust who will be there for you.  · Call your local CRISIS HOTLINE 969-3640 or 577-025-6641  · Call your local Children's Mobile Crisis Response Team [place] (986) 558-9463 or www.fitkit  · Call the toll free National Suicide Prevention Hotlines   · National Suicide Prevention Lifeline 290-825-ASTY (9700)  · National Hope Line Network 800-SUICIDE (203-6963)

## 2018-01-08 ENCOUNTER — OFFICE VISIT (OUTPATIENT)
Dept: MEDICAL GROUP | Facility: MEDICAL CENTER | Age: 23
End: 2018-01-08
Payer: COMMERCIAL

## 2018-01-08 VITALS
WEIGHT: 108 LBS | HEART RATE: 74 BPM | RESPIRATION RATE: 16 BRPM | HEIGHT: 69 IN | DIASTOLIC BLOOD PRESSURE: 64 MMHG | OXYGEN SATURATION: 96 % | TEMPERATURE: 98.9 F | BODY MASS INDEX: 16 KG/M2 | SYSTOLIC BLOOD PRESSURE: 110 MMHG

## 2018-01-08 DIAGNOSIS — Z63.79 STRESS DUE TO ILLNESS OF FAMILY MEMBER: ICD-10-CM

## 2018-01-08 DIAGNOSIS — J45.990 EXERCISE-INDUCED ASTHMA: ICD-10-CM

## 2018-01-08 DIAGNOSIS — F41.0 PANIC ATTACKS: ICD-10-CM

## 2018-01-08 DIAGNOSIS — D48.9 NEOPLASM OF UNCERTAIN BEHAVIOR: ICD-10-CM

## 2018-01-08 DIAGNOSIS — F50.02 ANOREXIA NERVOSA WITH BULIMIA: ICD-10-CM

## 2018-01-08 PROCEDURE — 99214 OFFICE O/P EST MOD 30 MIN: CPT | Performed by: FAMILY MEDICINE

## 2018-01-08 RX ORDER — HYDROXYZINE HYDROCHLORIDE 25 MG/1
TABLET, FILM COATED ORAL
Qty: 30 TAB | Refills: 1 | Status: SHIPPED | OUTPATIENT
Start: 2018-01-08 | End: 2018-10-18

## 2018-01-08 RX ORDER — ALBUTEROL SULFATE 90 UG/1
2 AEROSOL, METERED RESPIRATORY (INHALATION) EVERY 6 HOURS PRN
Qty: 8.5 G | Refills: 0 | Status: SHIPPED | OUTPATIENT
Start: 2018-01-08

## 2018-01-08 RX ORDER — SERTRALINE HYDROCHLORIDE 100 MG/1
100 TABLET, FILM COATED ORAL DAILY
Qty: 90 TAB | Refills: 3 | Status: SHIPPED | OUTPATIENT
Start: 2018-01-08 | End: 2018-04-03 | Stop reason: SDUPTHER

## 2018-01-08 RX ORDER — NORETHINDRONE ACETATE AND ETHINYL ESTRADIOL .02; 1 MG/1; MG/1
1 TABLET ORAL DAILY
Qty: 84 TAB | Refills: 3 | Status: SHIPPED | OUTPATIENT
Start: 2018-01-08 | End: 2018-04-03 | Stop reason: SDUPTHER

## 2018-01-08 ASSESSMENT — PATIENT HEALTH QUESTIONNAIRE - PHQ9
CLINICAL INTERPRETATION OF PHQ2 SCORE: 4
5. POOR APPETITE OR OVEREATING: 3 - NEARLY EVERY DAY
SUM OF ALL RESPONSES TO PHQ QUESTIONS 1-9: 14

## 2018-01-09 NOTE — PROGRESS NOTES
This medical record contains text that has been entered with the assistance of computer voice recognition and dictation software.  Therefore, it may contain unintended errors in text, spelling, punctuation, or grammar        Chief Complaint   Patient presents with   • Follow-Up     s/p urethral diverticulumn removal 12/13/2017       St. Kelsea Rey is a 21 y.o. female here evaluation and management of: weight loss, mental health, medication questions       HPI:     See assessment plan  This is our  Third encounter with pt   Called nausea at first but now talks more openly about disordered eating:  will restrict for days, and then will have occasional episodes of binge/purge   We started zoloft last visit and weaned off stimulant medication , she is taking low dose but finds that it helps somewhat  After some discussion: history of disordered eating and mental health   Also with depression and anxiety and compulsion     Had recent surgery for removal of vaginal cyst benign  Turned out to be larger than planned and she took 2 weeks off work with ze  She is doing well currently   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 84 Tab 3   • sertraline (ZOLOFT) 100 MG Tab Take 1 Tab by mouth every day. 90 Tab 3   • albuterol (PROAIR HFA) 108 (90 Base) MCG/ACT Aero Soln inhalation aerosol Inhale 2 Puffs by mouth every 6 hours as needed for Shortness of Breath. 8.5 g 0   • hydrOXYzine HCl (ATARAX) 25 MG Tab Take 1-2 tablets 3x daily as needed for anxiety 30 Tab 1   • fluticasone (FLONASE) 50 MCG/ACT nasal spray Spray 1 Spray in nose 1 time daily as needed.       No current  "facility-administered medications for this visit.      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   • Exercise-induced asthma 01/08/2018   • Panic attacks 01/08/2018   • Stress due to illness of family member 01/08/2018   • Seborrheic dermatitis of scalp 11/13/2017   • Nausea 11/13/2017   • Anxiety 11/13/2017   • Severe episode of recurrent major depressive disorder, without psychotic features (CMS-HCC) 11/13/2017   • Anorexia nervosa with bulimia 11/13/2017   • Hair loss 10/09/2017   • Postural dizziness with near syncope 10/09/2017   • Attention deficit hyperactivity disorder (ADHD), combined type 10/09/2017   • Environmental allergies 10/09/2017   • Plantar wart of left foot 10/09/2017   • PAC (premature atrial contraction) 10/09/2017     Past Surgical History:   Procedure Laterality Date   • DILATION AND CURETTAGE  12/13/2017    Procedure: DILATION AND CURETTAGE- FOR SKENE'S CYST REMOVAL VS MARSUPIALIZATION;  Surgeon: Luz Jeronimo M.D.;  Location: [facility];  Service: Gynecology      Social History   Substance Use Topics   • Smoking status: Current Every Day Smoker     Packs/day: 0.25     Years: 3.00     Types: Cigarettes   • Smokeless tobacco: Never Used   • Alcohol use No      Comment: 1 per month     Family History   Problem Relation Age of Onset   • Cancer Mother      Thyroid CA   • Hypertension Father            ROS    See HPI  + disordered eating and binge  Neg for chest pain, neg for headache, neg for syncope  All other systems reviewed and are negative     Objective:     Blood pressure 110/64, pulse 74, temperature 37.2 °C (98.9 °F), resp. rate 16, height 1.759 m (5' 9.25\"), weight 49 kg (108 lb), last menstrual period 07/08/2017, SpO2 96 %. Body mass index is 15.83 kg/m².  Physical Exam:    GEN: comfortable, alert and oriented, well nourished, well developed, in no apparent distress   HEENT: NCAT, eyes: pupils equal and " reactive, sclera white, EOMIT, good dentition  HEART: limbs warm and well perfused, regular rate, no JVD, no lower extremity edema  LUNGS: speaking in full sentences, not in apparent respiratory distress, no audible wheezes  MSK: normal tone and bulk, no swelling of the joints, gait steady and normal       3.5 lb up from last visit  She has birth иван R knee   Scaring from 2 previous punch biopsy  - speckled pigmentary pattern, she points out the inferior medial border which is hazing and receeding per her report    Assessment and Plan:   The following treatment plan was discussed        Problem List Items Addressed This Visit     Anorexia nervosa with bulimia     Improving on zoloft  Increase from 50-100mg  Follow up 2 mo   I continue to encourage her to est with [facility] for CBT, which was helpful for her in the past               Neoplasm of uncertain behavior     Birth иван   She is in the habit of q 1 year skin checks with derm and already has made appointment with our derm needs referral   Not really growing or changing per her report, other than the area of receeding of pigmentation on the inferior medial border as mentioned in the exam.  This could be haloing                   Relevant Orders    Patient has been identified as being depressed and appropriate orders and counseling have been given    REFERRAL TO DERMATOLOGY    Exercise-induced asthma    Relevant Medications    albuterol (PROAIR HFA) 108 (90 Base) MCG/ACT Aero Soln inhalation aerosol    Panic attacks     She mentions at end of encounter that in college she sought care for panic attack and was given albuterol, this has always been helpful for her which at first is a bit counter-intuitive given YURI affects.  However we believe that it was actually the deep breathing that was helpful for her. I explain this to her and she agrees after discussion.  She will try deep breathing techniques with future panic attack.    We also discuss medication.  I  offer xanax for occ use she declines, I offer propranolol and hydroxyzine.  She would like trial of hydroxyzine                     Relevant Medications    sertraline (ZOLOFT) 100 MG Tab    hydrOXYzine HCl (ATARAX) 25 MG Tab    Stress due to illness of family member     Her mom is also a patient of ours and has thyroid cancer recurrence (recently treated at [facility]) and schwannoma (involving spinal cord) being treated at [facility] and recently went through divorce and has new boyfriend 1 year.  The relationship between st white and her mom is strained      Over 25 minutes spent with patient face to face, greater than 50% time spent with plan/coordination of care regarding that which is discussed in the HPI and A&P                 Over 25 minutes spent with patient face to face, greater than 50% time spent with plan/cordination of care         Instructed to follow up if symptoms worsen or fail to improve, ER/UC precautions discussed as well    Kathe Dunaway MD  [facility], Family Medicine   [address] [place] [address] [address]   [place] [id number]  Phone: [phone]

## 2018-01-09 NOTE — ASSESSMENT & PLAN NOTE
She mentions at end of encounter that in college she sought care for panic attack and was given albuterol, this has always been helpful for her which at first is a bit counter-intuitive given YURI affects.  However we believe that it was actually the deep breathing that was helpful for her. I explain this to her and she agrees after discussion.  She will try deep breathing techniques with future panic attack.    We also discuss medication.  I offer xanax for occ use she declines, I offer propranolol and hydroxyzine.  She would like trial of hydroxyzine

## 2018-01-09 NOTE — ASSESSMENT & PLAN NOTE
Birth иван   She is in the habit of q 1 year skin checks with derm and already has made appointment with our derm needs referral   Not really growing or changing per her report, other than the area of receeding of pigmentation on the inferior medial border as mentioned in the exam.  This could be haloing

## 2018-01-09 NOTE — ASSESSMENT & PLAN NOTE
Improving on zoloft  Increase from 50-100mg  Follow up 2 mo   I continue to encourage her to est with [facility] for CBT, which was helpful for her in the past

## 2018-01-09 NOTE — ASSESSMENT & PLAN NOTE
Her mom is also a patient of ours and has thyroid cancer recurrence (recently treated at [facility]) and schwannoma (involving spinal cord) being treated at [facility] and recently went through divorce and has new boyfriend 1 year.  The relationship between st white and her mom is strained      Over 25 minutes spent with patient face to face, greater than 50% time spent with plan/coordination of care regarding that which is discussed in the HPI and A&P

## 2018-02-13 ENCOUNTER — OFFICE VISIT (OUTPATIENT)
Dept: DERMATOLOGY | Facility: IMAGING CENTER | Age: 23
End: 2018-02-13
Payer: COMMERCIAL

## 2018-02-13 ENCOUNTER — HOSPITAL ENCOUNTER (OUTPATIENT)
Facility: MEDICAL CENTER | Age: 23
End: 2018-02-13
Attending: DERMATOLOGY
Payer: COMMERCIAL

## 2018-02-13 VITALS — TEMPERATURE: 97.9 F | HEIGHT: 70 IN | BODY MASS INDEX: 15.17 KG/M2 | WEIGHT: 106 LBS

## 2018-02-13 DIAGNOSIS — L73.9 FOLLICULITIS: ICD-10-CM

## 2018-02-13 DIAGNOSIS — D48.5 NEOPLASM OF UNCERTAIN BEHAVIOR OF SKIN: ICD-10-CM

## 2018-02-13 DIAGNOSIS — L70.0 ACNE VULGARIS: ICD-10-CM

## 2018-02-13 DIAGNOSIS — D22.9 SPECKLED LENTIGINOUS NEVUS: ICD-10-CM

## 2018-02-13 DIAGNOSIS — B36.0 TINEA VERSICOLOR: ICD-10-CM

## 2018-02-13 PROCEDURE — 88305 TISSUE EXAM BY PATHOLOGIST: CPT

## 2018-02-13 PROCEDURE — 11100 PR BIOPSY OF SKIN LESION: CPT | Performed by: DERMATOLOGY

## 2018-02-13 PROCEDURE — 99203 OFFICE O/P NEW LOW 30 MIN: CPT | Mod: 25 | Performed by: DERMATOLOGY

## 2018-02-13 RX ORDER — DOXYCYCLINE HYCLATE 100 MG
100 TABLET ORAL 2 TIMES DAILY
Qty: 14 TAB | Refills: 0 | Status: SHIPPED | OUTPATIENT
Start: 2018-02-13 | End: 2018-02-14 | Stop reason: SDUPTHER

## 2018-02-13 RX ORDER — FLUOCINONIDE GEL 0.5 MG/G
1 GEL TOPICAL 2 TIMES DAILY
Qty: 45 G | Refills: 1 | Status: SHIPPED | OUTPATIENT
Start: 2018-02-13 | End: 2019-06-24

## 2018-02-13 ASSESSMENT — ENCOUNTER SYMPTOMS
CHILLS: 0
FEVER: 0

## 2018-02-14 RX ORDER — DOXYCYCLINE HYCLATE 100 MG
100 TABLET ORAL 2 TIMES DAILY
Qty: 28 TAB | Refills: 0 | Status: SHIPPED | OUTPATIENT
Start: 2018-02-14 | End: 2018-12-07

## 2018-02-14 NOTE — PROGRESS NOTES
"Dermatology New Patient Visit    Chief Complaint   Patient presents with   • Establish Care       Subjective:     HPI:   St. Kelsea Rey is a 22 y.o. female presenting for    Evaluation of a birth иван on her right knee   States was previously being followed at [facility] for this issue, last visit was >2 years ago  No significant change, but notes ?possible lightening, and notes a new bump over the central area of the patch  States this bump has increased in size  No itching/bleeding/pain  Has had 2 prior biopsies, both negative for malignant/atypical change  History of skin cancer: No  History of biopsies:yes , right knee 5 years ago . Negative   History of blistering/severe sunburns:Yes, Details: during youth  Family history of skin cancer:Yes, Details:  Mother - \"pre cancer\" spots on face   Family history of atypical moles:No    Rash on the scalp  Active for several months - primarily on posterior scalp   Areas become itchy, occasionally burn, notes occasionally scaling  Has tried treating with anti-dandruff shampoo, no improvement    Acne on the forehead, back  Has been an issue for 1+ years  Not treating   States she has a habit of picking areas on the back     Rash on the abdomen  Flares intermittently over the past year  Seems to improve w/o intervention  Worse in the summer/with heat   No itching  No treatments        Past Medical History:   Diagnosis Date   • ADHD (attention deficit hyperactivity disorder)    • Allergy    • Anemia     H/O   • Asthma     exercise,panic attacks,allergies   • Depression     anxiety/depression   • Fear of needles    • Gynecological disorder     endometriosis   • H/O dizziness    • History of fainting    • 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 84 Tab 3   • sertraline (ZOLOFT) 100 MG Tab Take 1 Tab by mouth every day. 90 Tab 3   • albuterol (PROAIR HFA) 108 (90 Base) MCG/ACT Aero Soln inhalation aerosol Inhale 2 Puffs by mouth every 6 hours as needed for Shortness of Breath. 8.5 g 0   • hydrOXYzine HCl (ATARAX) 25 MG Tab Take 1-2 tablets 3x daily as needed for anxiety 30 Tab 1   • fluticasone (FLONASE) 50 MCG/ACT nasal spray Spray 1 Spray in nose 1 time daily as needed.       No current facility-administered medications on file prior to visit.        Allergies   Allergen Reactions   • Latex Hives, Rash and Itching     Rash at contact site       Family History   Problem Relation Age of Onset   • Cancer Mother      Thyroid CA   • Hypertension Father        Social History     Social History   • Marital status: Single     Spouse name: N/A   • Number of children: N/A   • Years of education: N/A     Occupational History   • Not on file.     Social History Main Topics   • Smoking status: Current Every Day Smoker     Packs/day: 0.25     Years: 3.00     Types: Cigarettes   • Smokeless tobacco: Never Used   • Alcohol use No      Comment: 1 per month   • Drug use: No   • Sexual activity: Not on file     Other Topics Concern   • Not on file     Social History Narrative   • No narrative on file       Review of Systems   Constitutional: Negative for chills and fever.   Skin: Positive for rash. Negative for itching.   All other systems reviewed and are negative.       Objective:     A focused cutaneous exam was completed including: scalp, hair, ears, face, eyelids, conjunctiva, lips, neck, upper chest, abdomen, back, left and right upper extremities (including hands/digits and fingernails), left and right lower extremities with the following pertinent findings listed below. Remaining above-listed examined areas within normal limits / negative for rashes or lesions.    Temperature 36.6 °C (97.9 °F), height 1.778 m (5' 10\"), weight " 48.1 kg (106 lb).    Physical Exam   Constitutional: She is oriented to person, place, and time and well-developed, well-nourished, and in no distress.   HENT:   Head: Normocephalic and atraumatic.       Eyes: Conjunctivae and lids are normal.   Neck: Normal range of motion.   Pulmonary/Chest: Effort normal.   Neurological: She is alert and oriented to person, place, and time.   Skin: Skin is warm and dry.   Psychiatric: Mood and affect normal.   Vitals reviewed.      DATA: none applicable to review    Assessment and Plan:     1. Speckled lentiginous nevus - with overlying nodularity  Comment: benign appearing with suspected benign growth. Will perform bx today based on hx of evolving lesion   - continue interval monitoring, discussed slight increase risk of melanoma w/in these skin lesions  Procedure Note   Procedure: Biopsy by punch technique  Location: right knee, medial  Risks, benefits and alternatives of procedure discussed and written informed consent obtained. Time out completed. Area of biopsy prepped with alcohol. Anesthesia with 1% lidocaine with epinephrine administered with a 30 gauge needle. 3  mm punch biopsy of site performed. Hemostasis achieved with pressure and 4.0 monocryl sutures. Vaseline applied to wound with bandage. Patient tolerated procedure well, and there were no complications.  The pathology specimen was sent to the lab via the nursing staff.  Wound care was discussed with the patient.     2. Folliculitis, scalp - flaring  - discussed suspected diagnosis, management options, and expectations of treatment  - start doxycycline 100mg BID x 14 days. Instructed to take with food & water. Side effects including, but not limited to, GI upset, photosensitivity (and need for photoprotection) discussed.   - instructed to start lidex 0.05% gel to affected area of the scalp nightly until improved. Side effects discussed, including skin thinning, appearance of stretch marks (striae), easy bruising,  telangiectasias, and possible increased hair growth     3. Acne vulgaris - comedonal (face), inflammatory, mild/moderate with excoriation (back)  - Instructed to start Differin gel OTC to affected area on the face. To use pea-sized amount on entire face; start 2-3 nights/week and titrate up as tolerated. S/e irritation discussed.  - if skin becomes dry, OTC moisturizers recommended  - recommended OTC panoxyl or clean & clear benzoyl peroxide 10% wash to use on the body in the shower. To leave on 5 mins prior to rinsing. S/e bleaching of clothes/towels discussed  - discussed that above prescribed medications cannot be used during pregnancy    4. Tinea versicolor - mild activity today  - educated patient about diagnosis, management options, and expectations of treatment  - start loprox 0.77% cream to the affected area twice daily until improved  - recommended selsun blue vs head & shoulders 2-3 times/week (to leave on for 5-10 minutes prior to rinsing) for maintenance therapy  - if no improvement/more significant flare, will rx fluconazole    Followup: Return for f/u acne/folliculitis 3-4 months.    Isaura Gutierrez M.D.

## 2018-04-03 RX ORDER — NORETHINDRONE ACETATE AND ETHINYL ESTRADIOL 1.5-30(21)
1 KIT ORAL DAILY
Qty: 84 TAB | Refills: 3 | Status: SHIPPED | OUTPATIENT
Start: 2018-04-03 | End: 2018-10-18

## 2018-04-03 RX ORDER — NORETHINDRONE ACETATE AND ETHINYL ESTRADIOL .02; 1 MG/1; MG/1
1 TABLET ORAL DAILY
Qty: 84 TAB | Refills: 3 | Status: SHIPPED | OUTPATIENT
Start: 2018-04-03 | End: 2018-10-18 | Stop reason: SDUPTHER

## 2018-04-03 RX ORDER — SERTRALINE HYDROCHLORIDE 100 MG/1
100 TABLET, FILM COATED ORAL DAILY
Qty: 90 TAB | Refills: 3 | Status: SHIPPED | OUTPATIENT
Start: 2018-04-03 | End: 2018-10-18

## 2018-06-04 ENCOUNTER — OFFICE VISIT (OUTPATIENT)
Dept: URGENT CARE | Facility: CLINIC | Age: 23
End: 2018-06-04
Payer: COMMERCIAL

## 2018-06-04 VITALS
DIASTOLIC BLOOD PRESSURE: 70 MMHG | TEMPERATURE: 99 F | HEART RATE: 112 BPM | HEIGHT: 71 IN | OXYGEN SATURATION: 96 % | SYSTOLIC BLOOD PRESSURE: 106 MMHG | WEIGHT: 115 LBS | BODY MASS INDEX: 16.1 KG/M2

## 2018-06-04 DIAGNOSIS — J20.9 ACUTE BRONCHITIS, UNSPECIFIED ORGANISM: ICD-10-CM

## 2018-06-04 PROCEDURE — 99214 OFFICE O/P EST MOD 30 MIN: CPT | Performed by: NURSE PRACTITIONER

## 2018-06-04 RX ORDER — DOXYCYCLINE HYCLATE 100 MG
100 TABLET ORAL 2 TIMES DAILY
Qty: 14 TAB | Refills: 0 | Status: SHIPPED | OUTPATIENT
Start: 2018-06-04 | End: 2018-06-11

## 2018-06-04 RX ORDER — BENZONATATE 100 MG/1
100 CAPSULE ORAL 3 TIMES DAILY PRN
Qty: 60 CAP | Refills: 0 | Status: SHIPPED | OUTPATIENT
Start: 2018-06-04 | End: 2018-12-07

## 2018-06-04 ASSESSMENT — ENCOUNTER SYMPTOMS
DIAPHORESIS: 1
COUGH: 1
EYE DISCHARGE: 0
WHEEZING: 0
SHORTNESS OF BREATH: 0
HEADACHES: 1
ORTHOPNEA: 0
FEVER: 0
DIARRHEA: 0
MYALGIAS: 1
SPUTUM PRODUCTION: 1
CHILLS: 1
NAUSEA: 0
SORE THROAT: 0

## 2018-06-04 NOTE — PROGRESS NOTES
"Subjective:      St. Kelsea Rey is a 22 y.o. female who presents with Cough (\"chest congestion,not able to sleep at night,chills,sweats,ear pain\"x2weeks )            HPI This is a new problem. 22 year old female with 2 week history of cough, chest congestion, chills and sweats as well as ear pain. She denies sore throat or nasal congestion. Her cough is productive of sputum but without shortness of breath or wheezing. She is a some day smoker and she has mild exercise induced asthma.  Latex  Current Outpatient Prescriptions on File Prior to Visit   Medication Sig Dispense Refill   • norethindrone-ethinyl estradiol-iron (MICROGESTIN FE1.5/30) 1.5-30 MG-MCG tablet Take 1 Tab by mouth every day. 84 Tab 3   • sertraline (ZOLOFT) 100 MG Tab Take 1 Tab by mouth every day. 90 Tab 3   • norethindrone-ethinyl estradiol (JUNEL 1/20) 1-20 MG-MCG per tablet Take 1 Tab by mouth every day. 84 Tab 3   • doxycycline (VIBRAMYCIN) 100 MG Tab Take 1 Tab by mouth 2 times a day. 28 Tab 0   • fluocinonide (LIDEX) 0.05 % gel Apply 1 Application to affected area(s) 2 Times a Day. Scalp 45 g 1   • ciclopirox (LOPROX) 0.77 % cream Daily to the affected area on the body 1 Tube 2   • albuterol (PROAIR HFA) 108 (90 Base) MCG/ACT Aero Soln inhalation aerosol Inhale 2 Puffs by mouth every 6 hours as needed for Shortness of Breath. 8.5 g 0   • hydrOXYzine HCl (ATARAX) 25 MG Tab Take 1-2 tablets 3x daily as needed for anxiety 30 Tab 1   • fluticasone (FLONASE) 50 MCG/ACT nasal spray Spray 1 Spray in nose 1 time daily as needed.       No current facility-administered medications on file prior to visit.      Social History     Social History   • Marital status: Single     Spouse name: N/A   • Number of children: N/A   • Years of education: N/A     Occupational History   • Not on file.     Social History Main Topics   • Smoking status: Current Every Day Smoker     Packs/day: 0.25     Years: 3.00     Types: Cigarettes   • Smokeless " "tobacco: Never Used   • Alcohol use No      Comment: 1 per month   • Drug use: No   • Sexual activity: Not on file     Other Topics Concern   • Not on file     Social History Narrative   • No narrative on file     family history includes Cancer in her mother; Hypertension in her father.      Review of Systems   Constitutional: Positive for chills, diaphoresis and malaise/fatigue. Negative for fever.   HENT: Negative for congestion and sore throat.    Eyes: Negative for discharge.   Respiratory: Positive for cough and sputum production. Negative for shortness of breath and wheezing.    Cardiovascular: Negative for chest pain and orthopnea.   Gastrointestinal: Negative for diarrhea and nausea.   Musculoskeletal: Positive for myalgias.   Neurological: Positive for headaches.   Endo/Heme/Allergies: Negative for environmental allergies.          Objective:     /70   Pulse (!) 112   Temp 37.2 °C (99 °F)   Ht 1.803 m (5' 11\")   Wt 52.2 kg (115 lb)   SpO2 96%   BMI 16.04 kg/m²      Physical Exam   Constitutional: She is oriented to person, place, and time. She appears well-developed and well-nourished. No distress.   HENT:   Head: Normocephalic and atraumatic.   Right Ear: External ear and ear canal normal. Tympanic membrane is not injected and not perforated. No middle ear effusion.   Left Ear: External ear and ear canal normal. Tympanic membrane is not injected and not perforated.  No middle ear effusion.   Nose: Mucosal edema present.   Mouth/Throat: Posterior oropharyngeal erythema present. No oropharyngeal exudate.   Eyes: Conjunctivae are normal. Right eye exhibits no discharge. Left eye exhibits no discharge.   Neck: Normal range of motion. Neck supple.   Cardiovascular: Normal rate, regular rhythm and normal heart sounds.    No murmur heard.  Pulmonary/Chest: Effort normal and breath sounds normal. No respiratory distress.   Musculoskeletal: Normal range of motion.   Normal movement of all 4 extremities. "   Lymphadenopathy:     She has no cervical adenopathy.        Right: No supraclavicular adenopathy present.        Left: No supraclavicular adenopathy present.   Neurological: She is alert and oriented to person, place, and time. Gait normal.   Skin: Skin is warm and dry.   Psychiatric: She has a normal mood and affect. Her behavior is normal. Thought content normal.   Nursing note and vitals reviewed.              Assessment/Plan:     1. Acute bronchitis, unspecified organism  - doxycycline (VIBRAMYCIN) 100 MG Tab; Take 1 Tab by mouth 2 times a day for 7 days.  Dispense: 14 Tab; Refill: 0  - benzonatate (TESSALON) 100 MG Cap; Take 1 Cap by mouth 3 times a day as needed for Cough.  Dispense: 60 Cap; Refill: 0  Differential diagnosis, natural history, supportive care, and indications for immediate follow-up discussed at length.

## 2018-10-18 ENCOUNTER — OFFICE VISIT (OUTPATIENT)
Dept: MEDICAL GROUP | Facility: MEDICAL CENTER | Age: 23
End: 2018-10-18
Payer: COMMERCIAL

## 2018-10-18 VITALS
SYSTOLIC BLOOD PRESSURE: 108 MMHG | OXYGEN SATURATION: 98 % | HEIGHT: 71 IN | BODY MASS INDEX: 16.02 KG/M2 | WEIGHT: 114.4 LBS | HEART RATE: 93 BPM | DIASTOLIC BLOOD PRESSURE: 64 MMHG

## 2018-10-18 DIAGNOSIS — Z71.85 VACCINE COUNSELING: ICD-10-CM

## 2018-10-18 DIAGNOSIS — H53.9 VISION CHANGES: ICD-10-CM

## 2018-10-18 DIAGNOSIS — M54.12 CERVICAL RADICULOPATHY: ICD-10-CM

## 2018-10-18 DIAGNOSIS — G62.9 NEUROPATHY: ICD-10-CM

## 2018-10-18 PROBLEM — L21.9 SEBORRHEIC DERMATITIS OF SCALP: Status: RESOLVED | Noted: 2017-11-13 | Resolved: 2018-10-18

## 2018-10-18 PROBLEM — B07.0 PLANTAR WART OF LEFT FOOT: Status: RESOLVED | Noted: 2017-10-09 | Resolved: 2018-10-18

## 2018-10-18 PROBLEM — L65.9 HAIR LOSS: Status: RESOLVED | Noted: 2017-10-09 | Resolved: 2018-10-18

## 2018-10-18 PROBLEM — R55 POSTURAL DIZZINESS WITH NEAR SYNCOPE: Status: RESOLVED | Noted: 2017-10-09 | Resolved: 2018-10-18

## 2018-10-18 PROBLEM — I49.1 PAC (PREMATURE ATRIAL CONTRACTION): Status: RESOLVED | Noted: 2017-10-09 | Resolved: 2018-10-18

## 2018-10-18 PROBLEM — R42 POSTURAL DIZZINESS WITH NEAR SYNCOPE: Status: RESOLVED | Noted: 2017-10-09 | Resolved: 2018-10-18

## 2018-10-18 PROBLEM — R11.0 NAUSEA: Status: RESOLVED | Noted: 2017-11-13 | Resolved: 2018-10-18

## 2018-10-18 PROCEDURE — 90686 IIV4 VACC NO PRSV 0.5 ML IM: CPT | Performed by: FAMILY MEDICINE

## 2018-10-18 PROCEDURE — 99214 OFFICE O/P EST MOD 30 MIN: CPT | Mod: 25 | Performed by: FAMILY MEDICINE

## 2018-10-18 PROCEDURE — 90471 IMMUNIZATION ADMIN: CPT | Performed by: FAMILY MEDICINE

## 2018-10-18 RX ORDER — NORETHINDRONE ACETATE AND ETHINYL ESTRADIOL .02; 1 MG/1; MG/1
1 TABLET ORAL DAILY
Qty: 84 TAB | Refills: 3 | Status: SHIPPED | OUTPATIENT
Start: 2018-10-18 | End: 2019-06-24

## 2018-10-18 NOTE — ASSESSMENT & PLAN NOTE
Patient having numbness in her R upper exremity associated with neck strain   Most likely dx cervical radiculopathy given + sperlings   No weakness identified on exam   Will do labs as well   Follow up 2 weeks to review results  Start PT   X rays ordered as well      Over 25 minutes spent with patient face to face, greater than 50% time spent with plan/coordination of care regarding that which is discussed in the HPI and A&P

## 2018-12-07 ENCOUNTER — APPOINTMENT (OUTPATIENT)
Dept: RADIOLOGY | Facility: IMAGING CENTER | Age: 23
End: 2018-12-07
Attending: PHYSICIAN ASSISTANT
Payer: COMMERCIAL

## 2018-12-07 ENCOUNTER — OFFICE VISIT (OUTPATIENT)
Dept: URGENT CARE | Facility: CLINIC | Age: 23
End: 2018-12-07
Payer: COMMERCIAL

## 2018-12-07 ENCOUNTER — HOSPITAL ENCOUNTER (OUTPATIENT)
Dept: RADIOLOGY | Facility: MEDICAL CENTER | Age: 23
End: 2018-12-07
Attending: PHYSICIAN ASSISTANT
Payer: COMMERCIAL

## 2018-12-07 VITALS
HEART RATE: 108 BPM | DIASTOLIC BLOOD PRESSURE: 80 MMHG | WEIGHT: 113 LBS | TEMPERATURE: 97.8 F | RESPIRATION RATE: 16 BRPM | BODY MASS INDEX: 15.82 KG/M2 | OXYGEN SATURATION: 98 % | SYSTOLIC BLOOD PRESSURE: 122 MMHG | HEIGHT: 71 IN

## 2018-12-07 DIAGNOSIS — R22.1 NECK MASS: ICD-10-CM

## 2018-12-07 DIAGNOSIS — M54.12 CERVICAL RADICULOPATHY: ICD-10-CM

## 2018-12-07 PROCEDURE — 76536 US EXAM OF HEAD AND NECK: CPT

## 2018-12-07 PROCEDURE — 99214 OFFICE O/P EST MOD 30 MIN: CPT | Performed by: PHYSICIAN ASSISTANT

## 2018-12-07 PROCEDURE — 72040 X-RAY EXAM NECK SPINE 2-3 VW: CPT | Mod: 26 | Performed by: PHYSICIAN ASSISTANT

## 2018-12-07 ASSESSMENT — ENCOUNTER SYMPTOMS
SHORTNESS OF BREATH: 0
SEIZURES: 0
MUSCULOSKELETAL NEGATIVE: 1
CHILLS: 0
ABDOMINAL PAIN: 0
NAUSEA: 0
FEVER: 0
TINGLING: 1
DIARRHEA: 0
LOSS OF CONSCIOUSNESS: 0
DIZZINESS: 0
VOMITING: 0
HEADACHES: 0

## 2018-12-08 NOTE — PROGRESS NOTES
"Subjective:      St Kelsea Rey is a 23 y.o. female who presents with Nodule (x around 2 yrs, Lump on Rt. side of neck, numbness on rt. arm, Ringing on rt. ear, blurred vision on Rt eye)            HPI   Patient presents to urgent care reporting a 6 month history of right arm numbness and tingling. She also states she has a lump on the back of her neck on the right side and is concerned it is attributing to her symptoms. She reports intermittent ringing in her right ear as well. No history of head or neck trauma. She reports some blurry vision in her right eye today as well. No fevers, chills, body aches, headaches, dizziness, loss of balance, neck pain, nausea, or vomiting.     Review of Systems   Constitutional: Negative for chills and fever.   HENT: Negative for congestion.    Respiratory: Negative for shortness of breath.    Cardiovascular: Negative for chest pain.   Gastrointestinal: Negative for abdominal pain, diarrhea, nausea and vomiting.   Genitourinary: Negative.    Musculoskeletal: Negative.         + neck mass   Skin: Negative for rash.   Neurological: Positive for tingling. Negative for dizziness, seizures, loss of consciousness and headaches.        + numbness          Objective:     /80 (BP Location: Left arm, Patient Position: Sitting, BP Cuff Size: Adult)   Pulse (!) 108   Temp 36.6 °C (97.8 °F) (Temporal)   Resp 16   Ht 1.803 m (5' 10.98\")   Wt 51.3 kg (113 lb)   SpO2 98%   BMI 15.77 kg/m²      Physical Exam   Constitutional: She is oriented to person, place, and time. She appears well-developed and well-nourished. No distress.   HENT:   Head: Normocephalic and atraumatic.   Right Ear: Hearing, tympanic membrane, external ear and ear canal normal.   Left Ear: Hearing, tympanic membrane, external ear and ear canal normal.   Mouth/Throat: Oropharynx is clear and moist. No oropharyngeal exudate, posterior oropharyngeal edema or posterior oropharyngeal erythema.   Eyes: Pupils " are equal, round, and reactive to light. Conjunctivae, EOM and lids are normal. Right eye exhibits no discharge. Left eye exhibits no discharge. Right conjunctiva is not injected. Left conjunctiva is not injected.   Visual acuity: uncorrected  Right: 20/50  Left: 20/40  Both: 20/40   Neck: Normal range of motion. No spinous process tenderness and no muscular tenderness present. Normal range of motion present.       Right posterior cervical lymph present. Mobile and non-tender    Cardiovascular: Normal rate.    Pulmonary/Chest: Effort normal.   Musculoskeletal: Normal range of motion.   Neurological: She is alert and oriented to person, place, and time.   Skin: Skin is warm and dry. She is not diaphoretic.   Psychiatric: She has a normal mood and affect. Her behavior is normal.   Nursing note and vitals reviewed.         PMH:  has a past medical history of ADHD (attention deficit hyperactivity disorder); Allergy; Anemia; Asthma; Depression; Fear of needles; Gynecological disorder; H/O dizziness; History of fainting; Infectious disease; Low blood phosphate; and QT prolongation (1/2009).  MEDS:   Current Outpatient Prescriptions:   •  Norethin-Eth Estrad-Fe Biphas (LO LOESTRIN FE PO), Take  by mouth., Disp: , Rfl:   •  norethindrone-ethinyl estradiol (JUNEL 1/20) 1-20 MG-MCG per tablet, Take 1 Tab by mouth every day., Disp: 84 Tab, Rfl: 3  •  fluocinonide (LIDEX) 0.05 % gel, Apply 1 Application to affected area(s) 2 Times a Day. Scalp, Disp: 45 g, Rfl: 1  •  ciclopirox (LOPROX) 0.77 % cream, Daily to the affected area on the body, Disp: 1 Tube, Rfl: 2  •  albuterol (PROAIR HFA) 108 (90 Base) MCG/ACT Aero Soln inhalation aerosol, Inhale 2 Puffs by mouth every 6 hours as needed for Shortness of Breath., Disp: 8.5 g, Rfl: 0  •  fluticasone (FLONASE) 50 MCG/ACT nasal spray, Spray 1 Spray in nose 1 time daily as needed., Disp: , Rfl:   ALLERGIES:   Allergies   Allergen Reactions   • Latex Hives, Rash and Itching     Rash at  contact site     SURGHX:   Past Surgical History:   Procedure Laterality Date   • DILATION AND CURETTAGE  12/13/2017    Procedure: DILATION AND CURETTAGE- FOR SKENE'S CYST REMOVAL VS MARSUPIALIZATION;  Surgeon: Luz Jeronimo M.D.;  Location: SURGERY [facility];  Service: Gynecology     SOCHX:  reports that she has been smoking Cigarettes.  She has a 0.75 pack-year smoking history. She has never used smokeless tobacco. She reports that she does not drink alcohol or use drugs.  FH: family history includes Cancer in her mother; Hypertension in her father.       Assessment/Plan:     1. Cervical radiculopathy  - DX-CERVICAL SPINE-2 OR 3 VIEWS; Future  Impression       1.  Unremarkable cervical spine.  2.  Prominent tonsils.     - REFERRAL TO NEUROLOGY    2. Neck mass  - US-SOFT TISSUES OF HEAD - NECK; Future  Impression       1.  There is no evidence of abnormal solid or cystic mass or abnormally enlarged lymph node in the right side of the neck at site of palpable abnormality or surrounding region.     Discussed xray and US results with patient. Encouraged to follow up with PCP for discussion of getting an MRI for further evaluation. Referral to neurology also placed today. Strict ED precautions discussed. The patient demonstrated a good understanding and agreed with the treatment plan.

## 2019-03-24 ENCOUNTER — HOSPITAL ENCOUNTER (EMERGENCY)
Facility: MEDICAL CENTER | Age: 24
End: 2019-03-25
Attending: EMERGENCY MEDICINE
Payer: COMMERCIAL

## 2019-03-24 DIAGNOSIS — R45.851 SUICIDAL IDEATION: ICD-10-CM

## 2019-03-24 LAB
AMPHET UR QL SCN: NEGATIVE
BARBITURATES UR QL SCN: NEGATIVE
BENZODIAZ UR QL SCN: NEGATIVE
BZE UR QL SCN: NEGATIVE
CANNABINOIDS UR QL SCN: NEGATIVE
HCG SERPL QL: NEGATIVE
HCG UR QL: NEGATIVE
METHADONE UR QL SCN: NEGATIVE
OPIATES UR QL SCN: NEGATIVE
OXYCODONE UR QL SCN: NEGATIVE
PCP UR QL SCN: NEGATIVE
POC BREATHALIZER: 0.02 PERCENT (ref 0–0.01)
PROPOXYPH UR QL SCN: NEGATIVE
SP GR UR REFRACTOMETRY: <=1.005

## 2019-03-24 PROCEDURE — 99285 EMERGENCY DEPT VISIT HI MDM: CPT

## 2019-03-24 PROCEDURE — 700102 HCHG RX REV CODE 250 W/ 637 OVERRIDE(OP): Performed by: EMERGENCY MEDICINE

## 2019-03-24 PROCEDURE — 302970 POC BREATHALIZER

## 2019-03-24 PROCEDURE — 80307 DRUG TEST PRSMV CHEM ANLYZR: CPT

## 2019-03-24 PROCEDURE — A9270 NON-COVERED ITEM OR SERVICE: HCPCS | Performed by: EMERGENCY MEDICINE

## 2019-03-24 PROCEDURE — 84703 CHORIONIC GONADOTROPIN ASSAY: CPT

## 2019-03-24 PROCEDURE — 81025 URINE PREGNANCY TEST: CPT

## 2019-03-24 PROCEDURE — 302970 POC BREATHALIZER: Performed by: EMERGENCY MEDICINE

## 2019-03-24 RX ORDER — LORAZEPAM 1 MG/1
1 TABLET ORAL ONCE
Status: COMPLETED | OUTPATIENT
Start: 2019-03-24 | End: 2019-03-24

## 2019-03-24 RX ADMIN — LORAZEPAM 1 MG: 1 TABLET ORAL at 22:58

## 2019-03-25 VITALS
OXYGEN SATURATION: 97 % | HEIGHT: 71 IN | TEMPERATURE: 98.6 F | BODY MASS INDEX: 15.43 KG/M2 | DIASTOLIC BLOOD PRESSURE: 77 MMHG | WEIGHT: 110.23 LBS | SYSTOLIC BLOOD PRESSURE: 103 MMHG | RESPIRATION RATE: 18 BRPM | HEART RATE: 84 BPM

## 2019-03-25 PROCEDURE — 90791 PSYCH DIAGNOSTIC EVALUATION: CPT

## 2019-03-25 PROCEDURE — 99284 EMERGENCY DEPT VISIT MOD MDM: CPT | Performed by: PSYCHIATRY & NEUROLOGY

## 2019-03-25 ASSESSMENT — ENCOUNTER SYMPTOMS
SHORTNESS OF BREATH: 0
WEIGHT LOSS: 1
NAUSEA: 0
VOMITING: 0
ABDOMINAL PAIN: 0

## 2019-03-25 NOTE — CONSULTS
[facility]   TRIAGE ASSESSMENT    Name: St Kelsea Rey  MRN: [id number]  : 1995  Age: 23 y.o.  Date of assessment: 3/24/2019  PCP: Kathe Dunaway M.D.  Persons in attendance: Patient and best friend Melina    CHIEF COMPLAINT/PRESENTING ISSUE (as stated by patient): The patient arrived at the ER voluntarily accompanied by her best friend (The patient requested her best friend be in the room for support); the patient reports she is actively suicidal at this time and came to the ER seeking help, with a plan to cut her wrists or drown herself. She reports experiencing recurrent bouts of depression and anxiety in the last 5+ years, adding that she believes she has Borderline Personality DO alongside themes of paranoia. The patient related that she also recently broke up with her girlfriend in the past 4 days, noting this as a significant additional stressor in her life. The patient presents as high functioning and is fearful of suicide, stating that she is future oriented and is currently attending college at [employer]. The patient reports a history of self-harm (cutting, burning behaviors) but has no history of suicide attempts. The patient also reports that she has struggled with an Eating Disorder since , approximately, noting that she struggles with maintaining a healthy weight as a result of restricting food intake. The patient is not currently taking any medications, previously she was taking Lexapro and Wellbutrin but noted they were not helpful at the time. The patient presented as friendly and cooperative throughout the duration of the assessment.  Chief Complaint   Patient presents with   • Suicidal Ideation     Pt presented with a complaint of Suicidal ideations. PT states that she would cut her wrists.         CURRENT LIVING SITUATION/SOCIAL SUPPORT: The patient currently lives with a friend while attending college at [employer]. The patient denies any financial hardship and notes  that she has a strong network of family and social supports.    BEHAVIORAL HEALTH TREATMENT HISTORY  Does patient/parent report a history of prior behavioral health treatment for patient?   Yes:    Dates Level of Care Facilty/Provider Diagnosis/Problem Medications   1820[facility] ([facility]) Major Depressive DO, Generalized Anxiety DO Lexapro for 3-6 months, Wellbutrin for 3-6 months (Both medications were tried but were noted as not being helpful during this time)         SAFETY ASSESSMENT - SELF  Does patient acknowledge current or past symptoms of dangerousness to self? yes  Does parent/significant other report patient has current or past symptoms of dangerousness to self? N\A  Does presenting problem suggest symptoms of dangerousness to self? Yes:     Past Current    Suicidal Thoughts: [x]  [x]    Suicidal Plans: [x]  [x]    Suicidal Intent: [x]  [x]    Suicide Attempts: []  []    Self-Injury [x]  []      For any boxes checked above, provide detail: The patient reports a history of self-harm and SI, reporting that she is currently suicidal with a plan to slash her wrists or drown herself.    History of suicide by family member: no  History of suicide by friend/significant other: no  Recent change in frequency/specificity/intensity of suicidal thoughts or self-harm behavior? yes - In the last 4 days.  Current access to firearms, medications, or other identified means of suicide/self-harm? no  If yes, willing to restrict access to means of suicide/self-harm? yes -   Protective factors present:  Fear of suicide, Future-oriented, Hopefulness and Willing to address in treatment    SAFETY ASSESSMENT - OTHERS  Does patient acknowledge current or past symptoms of aggressive behavior or risk to others? no  Does parent/significant other report patient has current or past symptoms of aggressive behavior or risk to others?  N\A  Does presenting problem suggest symptoms of dangerousness to others?  "No    Crisis Safety Plan completed and copy given to patient? Unable to contract for safety at this time; patient is actively suicidal.    ABUSE/NEGLECT SCREENING  Does patient report feeling “unsafe” in his/her home, or afraid of anyone?  no  Does patient report any history of physical, sexual, or emotional abuse?  no  Does parent or significant other report any of the above? N\A  Is there evidence of neglect by self?  no  Is there evidence of neglect by a caregiver? no  Does the patient/parent report any history of CPS/APS/police involvement related to suspected abuse/neglect or domestic violence? no  Based on the information provided during the current assessment, is a mandated report of suspected abuse/neglect being made?  No    SUBSTANCE USE SCREENING  Yes:  Carter all substances used in the past 30 days:      Last Use Amount   [x]   Alcohol 3/24/19 2 drinks   []   Marijuana     []   Heroin     []   Prescription Opioids  (used without prescription, for    recreation, or in excess of prescribed amount)     []   Other Prescription  (used without prescription, for    recreation, or in excess of prescribed amount)     []   Cocaine      []   Methamphetamine     []   \"\" drugs (ectasy, MDMA)     []   Other substances        UDS results: Pending  Breathalyzer results: 0.02    What consequences does the patient associate with any of the above substance use and or addictive behaviors? None        MENTAL STATUS   Participation: Active verbal participation, Engaged and Open to feedback  Grooming: Good  Orientation: Alert and Fully Oriented  Behavior: Calm  Eye contact: Good  Mood: Depressed and Anxious  Affect: Full range, Congruent with content, Sad, Anxious and Tearful  Thought process: Logical and Goal-directed  Thought content: Within normal limits  Speech: Rate within normal limits and Volume within normal limits  Perception: Within normal limits  Memory:  No gross evidence of memory deficits  Insight: " Good  Judgment:  Limited  Other:    Collateral information:   Source:  [x] Significant other present in person:  Best friend Melina   [] Significant other by telephone  [] [facility]   [x] [facility]  [x] [facility]  [] Other:        CLINICAL IMPRESSIONS:  Primary:  Major Depressive DO, recurrent, severe  Secondary:  Generalized Anxiety DO       IDENTIFIED NEEDS/PLAN:  [Trigger DISPOSITION list for any items marked]    [x]  Imminent safety risk - self [] Imminent safety risk - others   []  Acute substance withdrawal []  Psychosis/Impaired reality testing   [x]  Mood/anxiety []  Substance use/Addictive behavior   []  Maladaptive behaviro []  Parent/child conflict   []  Family/Couples conflict []  Biomedical   []  Housing []  Financial   []   Legal  Occupational/Educational   []  Domestic violence []  Other:     Disposition: Consulted with Dr. Rodriguez. Patient to be placed on a legal hold pending safety concerns.     Does patient express agreement with the above plan? yes    Referral appointment(s) scheduled? no    Alert team only:   I have discussed findings and recommendations with Dr. Rodriguez who is in agreement with these recommendations.     Referral information sent to the following community providers :    If applicable : Referred  to : Zahra for legal hold follow up at (time): 9:56PM      DIANE Carlos  3/24/2019

## 2019-03-25 NOTE — DISCHARGE PLANNING
Medical Social Work    Referral: Legal Hold    Intervention: Legal Hold Paperwork given to SW by [facility] [employer] RN Braulio    Legal Hold Initiated: Date: 3/24/19 Time: 2118    Patient’s Insurance Listed on Face Sheet: [employer]    Referrals sent to: [facility], [facility], [facility], and [facility].     This referral contains the following information:  1) Face sheet __x__  2) Page 1 and Page 2 of Legal Hold __x__  3) Alert Team Assessment/Psych Assessment __x__  4) Head to toe physical exam __x__  5) Urine Drug Screen __x__  6) Blood Alcohol __x__  7) Vital signs __x__  8) Pregnancy test when applicable _x__  9) Medications list __x__    Plan: Patient will transfer to mental health facility once acceptance is obtained

## 2019-03-25 NOTE — ED NOTES
Report received from Arturo RODRIGUEZ, assumed care of patient.  Bed in lowest position.  Sitter outside of room.

## 2019-03-25 NOTE — ED TRIAGE NOTES
"[facility]  Chief Complaint   Patient presents with   • Suicidal Ideation     Pt presented with a complaint of Suicidal ideations. PT states that she would cut her wrists.      Pt ambulatory to triage with above complaint escorted by friend.    /98   Pulse (!) 130   Temp 37.1 °C (98.8 °F) (Temporal)   Resp 16   Ht 1.803 m (5' 11\")   Wt 50 kg (110 lb 3.7 oz)   LMP 06/01/2018   SpO2 98%   BMI 15.37 kg/m²     Charge Rn notified. Pt roomed immediately in [id number]  "

## 2019-03-25 NOTE — DISCHARGE PLANNING
Medical Social Work     SW received a call from [facility] and they are declining the pt due to not being acute enough for in patient treatment.

## 2019-03-25 NOTE — DISCHARGE PLANNING
LSW received call from Lyubov guzman/ Bautista Barajas stating the pt has been accepted by Dr. Rebolledo. PCS form and Facesheet faxed to [facility]. Transportation arranged w/ Gal @ [facility] for 1030. KALEBW completed transfer packet and placed original LH in packet and placed on pt's chart. Bedside RN and Bautista Barajas notified of 1030 transport time.

## 2019-03-25 NOTE — PSYCHIATRY
"PSYCHIATRIC INTAKE EVALUATION     *Reason for admission: 23 year old female admitted with suicidal ideation                  *Reason for consult:  Psych Eval  *Requesting Physician/APN:  Carlos Rodriguez M.D.       Supervising Psychiatrist:  Dr. Kwok     Legal Hold on admission: on hold    *Chief Complaint:   \"I felt unsafe\"    *HPI (includes Psychiatric ROS):  23 year old female presented with suicidal ideation. Patient tearful with blunted affect throughout interview. She states that yesterday she started having increased thoughts of suicide and held scissors to her wrists at which time she called her friend to bring her to the ED. She states a previous incidence where she was in a similar position and states \"I knew things were getting bad and didn't trust myself.\" She notes a history of impulsivity when her mood is dysregulated and feels this has been increasing recently. 2 weeks ago her girlfriend of 2 years ended the relationship and she feels this has been a large stressor. She also notes stress with school and work. She states she often has derogatory thoughts about herself and struggles with feelings of worthlessness. She saw a therapist in December. She states this was an MFT and she picked this therapist because her parents were going through a divorce, when she realized she had more personal symptoms that were difficult to deal with she felt she was being a burden on the therapist and did not return. She has not seen a psychiatrist in approximately 4 years. She endorses depressed mood with anhedonia, poor sleep, poor appetite, low energy, poor concentration and feeling hopeless. She notes increased anxiety as well.     Eating and Feeding: she states she has been calorie counting and restricting, she voices that yesterday was the most she has eaten in a week and she ate half a turkey sandwhich and half a breadstick during the day; denies purging   OCD: she endorses obsessive and intrusive " "thoughts of peeling off her skin, thoughts of hurting her dog which she states she would never do and does not have intention to do but has repeated thoughts. She states plants within her home are in particular places and she has exacerbation of anxiety if this is changed with a requirement to quickly change this back.   PTSD: denies trauma   Bhakti: denies increased goal directed activity or increased energy         *Medical Review Of Symptoms (not dx conditions):   Review of Systems   Constitutional: Positive for malaise/fatigue and weight loss.   Respiratory: Negative for shortness of breath.    Cardiovascular: Negative for chest pain.   Gastrointestinal: Negative for abdominal pain, nausea and vomiting.       All other systems reviewed and are negative.       *Psychiatric Examination:   Vitals:   Vitals:    03/25/19 1015   BP: 103/77   Pulse: 84   Resp: 18   Temp: 37 °C (98.6 °F)   SpO2:        General Appearance: 23 year old female appropriate to stated age, disheveled, tearful with fair eye contact  Abnormal Movements: no psychomotor agitation or retardation noted  Gait and Posture: no abnormalities in gait or posture  Speech: regular rate, regular volume, no slurring of speech, no stuttering   Though Process: linear  Associations: no loose associatoins  Abnormal or Psychotic Thoughts:denies auditory hallucinations, denies visual hallucinations, no delusions noted on interview  Judgement and Insight: good/good   Orientation: oriented to self, place, and situation   Recent and Remote Memory: grossly in tact although not formally tested  Attention Span and Concentration: appropriate  Language: no word finding difficulties, no expressive aphasia, no receptive aphasia   Fund of Knowledge: not formally tested - appears fair  Mood and Affect: \"depressed\" Blunted, tearful, depressed   SI/HI: endorses/denies      *PAST MEDICAL/PSYCH/FAMILY/SOCIAL(as reported by patient):         *medical hx:      " "  TBI:denies  SZ:denies  Stroke: denies  Past Medical History:   Diagnosis Date   • ADHD (attention deficit hyperactivity disorder)    • Allergy    • Anemia     H/O   • Asthma     exercise,panic attacks,allergies   • Depression     anxiety/depression   • Fear of needles    • Gynecological disorder     endometriosis   • H/O dizziness    • History of fainting    • 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   • Low blood phosphate     13 or 14   • QT prolongation 1/2009     Past Surgical History:   Procedure Laterality Date   • DILATION AND CURETTAGE  12/13/2017    Procedure: DILATION AND CURETTAGE- FOR SKENE'S CYST REMOVAL VS MARSUPIALIZATION;  Surgeon: Luz Jeronimo M.D.;  Location: SURGERY [facility];  Service: Gynecology        *psychiatric hx:   SAs: states 3-4 years ago she held a knife to her and was going to go into the shower when she called a friend and received help  Self Injurious Behavior: history of cutting self -last cut self one year ago, states she scratches herself when upset and scratched herself yesterday   Hospitalizations: denies  Med Hx: Lexapro (3 years ago), Wellbutrin (3 years ago), Zoloft   Dx Hx: Possible Borderline Personality Disorder diagnosis, Depression, \"Eating Disorder\"  Other: Patient states she participate in DBT for one year once a week - saw a psychiatrist while she was there. This was in 2014. She has seen therapists since this time but states it has not been consistent     *family Psych hx:  Father Depression, Mother- believes depression and anxiety but not diagnosed, Maternal Aunt Bipolar Disorder, Mom's cousin - Bipolar Disorder, committed suicide       *social hx:  Alcohol: 1-2 drinks on weekends, may have glass of wine at night during week  Drugs: denies    She currently lives independently in a rental home with a  Roommate who is a friend of 11 years. She grew up in [place] and moved to [place] when she was 8. She notes her father started leaving a lot for work " after this and her mother became withdrawn. She has a younger sister who is 17 and 2 older half siblings. She does not have contact with one of her older siblings. 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 Her  Father and sister live in [place], her mother lives in [place].       *MEDICAL HX: labs, MARS, medications, etc were reviewed. Only those findings of potential interest to psychiatry are noted below:    *Current Medical issues:   Denies     *Allergies:  Allergies   Allergen Reactions   • Latex Hives, Rash and Itching     Rash at contact site     *Current Medications:  No current facility-administered medications for this encounter.     Current Outpatient Prescriptions:   •  Norethin-Eth Estrad-Fe Biphas (LO LOESTRIN FE PO), Take  by mouth., Disp: , Rfl:   •  norethindrone-ethinyl estradiol (JUNEL 1/20) 1-20 MG-MCG per tablet, Take 1 Tab by mouth every day., Disp: 84 Tab, Rfl: 3  •  fluocinonide (LIDEX) 0.05 % gel, Apply 1 Application to affected area(s) 2 Times a Day. Scalp, Disp: 45 g, Rfl: 1  •  ciclopirox (LOPROX) 0.77 % cream, Daily to the affected area on the body, Disp: 1 Tube, Rfl: 2  •  albuterol (PROAIR HFA) 108 (90 Base) MCG/ACT Aero Soln inhalation aerosol, Inhale 2 Puffs by mouth every 6 hours as needed for Shortness of Breath., Disp: 8.5 g, Rfl: 0  •  fluticasone (FLONASE) 50 MCG/ACT nasal spray, Spray 1 Spray in nose 1 time daily as needed., Disp: , Rfl:   *EKG:  Not available   *Imaging: relevant imaging not available   EEG:  Not available      *Labs:  No results for input(s): WBC, RBC, HEMOGLOBIN, HEMATOCRIT, MCV, MCH, RDW, PLATELETCT, MPV, NEUTSPOLYS, LYMPHOCYTES, MONOCYTES, EOSINOPHILS, BASOPHILS, RBCMORPHOLO in the last 72 hours.  Lab Results   Component Value Date/Time    SODIUM 138 10/04/2017 08:57 AM    POTASSIUM 3.7 10/04/2017 08:57 AM    CHLORIDE 107 10/04/2017 08:57 AM    CO2 21 10/04/2017 08:57 AM    GLUCOSE 90 10/04/2017 08:57 AM     BUN 10 10/04/2017 08:57 AM    CREATININE 0.62 10/04/2017 08:57 AM           Lab Results  Component Value Date/Time   BREATHALIZER 0.024 (A) 03/24/2019 2030     No components found for: BLOODALCOHOL     Lab Results  Component Value Date/Time   AMPHUR Negative 03/24/2019 2029   BARBSURINE Negative 03/24/2019 2029   BENZODIAZU Negative 03/24/2019 2029   COCAINEMET Negative 03/24/2019 2029   METHADONE Negative 03/24/2019 2029   OPIATES Negative 03/24/2019 2029   OXYCODN Negative 03/24/2019 2029   PCPURINE Negative 03/24/2019 2029   PROPOXY Negative 03/24/2019 2029   CANNABINOID Negative 03/24/2019 2029     No results found for: TSH, FREET4      *ASSESSMENT/PLAN:  1. Major Depressive Disorder, Severe  - Patient has been transferred to [place] - treatment deferred to providers at facility           2. Unspecified Feeding or Eating Disorder  -     R/O Anorexia Nervosa    3. Unspecified Obsessive Compulsive and Related Disorder        Legal hold:   On hold, extended  *Signing off  *Thank you for the consult

## 2019-04-01 ENCOUNTER — OFFICE VISIT (OUTPATIENT)
Dept: MEDICAL GROUP | Facility: MEDICAL CENTER | Age: 24
End: 2019-04-01
Payer: COMMERCIAL

## 2019-04-01 VITALS
HEIGHT: 71 IN | TEMPERATURE: 99.5 F | HEART RATE: 96 BPM | SYSTOLIC BLOOD PRESSURE: 100 MMHG | BODY MASS INDEX: 15.83 KG/M2 | DIASTOLIC BLOOD PRESSURE: 58 MMHG | OXYGEN SATURATION: 97 % | WEIGHT: 113.1 LBS

## 2019-04-01 DIAGNOSIS — F33.2 SEVERE EPISODE OF RECURRENT MAJOR DEPRESSIVE DISORDER, WITHOUT PSYCHOTIC FEATURES (HCC): ICD-10-CM

## 2019-04-01 DIAGNOSIS — F50.02 ANOREXIA NERVOSA WITH BULIMIA: ICD-10-CM

## 2019-04-01 PROCEDURE — 99214 OFFICE O/P EST MOD 30 MIN: CPT | Performed by: FAMILY MEDICINE

## 2019-04-01 RX ORDER — FLUOXETINE 10 MG/1
10 CAPSULE ORAL DAILY
COMMUNITY
End: 2019-06-24 | Stop reason: SDUPTHER

## 2019-04-01 RX ORDER — BUSPIRONE HYDROCHLORIDE 5 MG/1
5 TABLET ORAL 2 TIMES DAILY
COMMUNITY
End: 2020-06-24

## 2019-04-01 ASSESSMENT — PATIENT HEALTH QUESTIONNAIRE - PHQ9
2. FEELING DOWN, DEPRESSED, IRRITABLE, OR HOPELESS: NEARLY EVERY DAY
3. TROUBLE FALLING OR STAYING ASLEEP OR SLEEPING TOO MUCH: MORE THAN HALF THE DAYS
8. MOVING OR SPEAKING SO SLOWLY THAT OTHER PEOPLE COULD HAVE NOTICED. OR THE OPPOSITE, BEING SO FIGETY OR RESTLESS THAT YOU HAVE BEEN MOVING AROUND A LOT MORE THAN USUAL: NOT AT ALL
1. LITTLE INTEREST OR PLEASURE IN DOING THINGS: MORE THAN HALF THE DAYS
7. TROUBLE CONCENTRATING ON THINGS, SUCH AS READING THE NEWSPAPER OR WATCHING TELEVISION: NEARLY EVERY DAY
SUM OF ALL RESPONSES TO PHQ QUESTIONS 1-9: 19
5. POOR APPETITE OR OVEREATING: MORE THAN HALF THE DAYS
4. FEELING TIRED OR HAVING LITTLE ENERGY: NEARLY EVERY DAY
9. THOUGHTS THAT YOU WOULD BE BETTER OFF DEAD, OR OF HURTING YOURSELF: MORE THAN HALF THE DAYS
6. FEELING BAD ABOUT YOURSELF - OR THAT YOU ARE A FAILURE OR HAVE LET YOURSELF OR YOUR FAMILY DOWN: MORE THAN HALF THE DAYS
SUM OF ALL RESPONSES TO PHQ9 QUESTIONS 1 AND 2: 5

## 2019-04-01 NOTE — ASSESSMENT & PLAN NOTE
Met   She was referred to Dr. Ashley at [facility] for therapy. She is also intertested in  [facility] therapist if that can bubba her established sooner.

## 2019-04-01 NOTE — ASSESSMENT & PLAN NOTE
"Recently discharged 3 days ago from in patient psych facility. Started on prozac and buspar. No side effects form these. Feels much safer. 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   "

## 2019-04-02 NOTE — PROGRESS NOTES
"Subjective:     CC: Diagnoses of Severe episode of recurrent major depressive disorder, without psychotic features (HCC) and Anorexia nervosa with bulimia were pertinent to this visit.    HPI: Patient is a 23 y.o. female established patient of Dr. Dunaway's who presents today for hospital follow-up.  Admitted for suicidal ideation.      Severe episode of recurrent major depressive disorder, without psychotic features (CMS-HCC) (HCC)  Recently discharged 3 days ago from in patient psych facility. Started on prozac and buspar. No side effects form these. Feels much safer. Still has fleeting thoughts of suicide but notes she is able to \"throw them in the trash.\"  Denies active suicidal ideation.  She spent 5 days at [facility].  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     Anorexia nervosa with bulimia  The patient has a history of anorexia.  Her BMI is currently 15.  She states that she is being somewhat restricted with her food, but also states that she has been worse in the past.  She was referred to Dr. Mera at [facility] for therapy.  She has not made an appointment with him yet.  However, she would very much like to establish with a therapist as soon as possible.  She has had a referral placed to the [facility] but this was over a year ago, she asks for another referral today.      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     • busPIRone (BUSPAR) 5 MG tablet Take 5 mg by mouth 2 times a day.     • Norethin-Eth Estrad-Fe Biphas (LO LOESTRIN FE PO) Take  by mouth.     • norethindrone-ethinyl estradiol (JUNEL 1/20) 1-20 MG-MCG per tablet Take 1 Tab by mouth every day. (Patient not taking: Reported on 4/1/2019) 84 Tab 3   • fluocinonide (LIDEX) 0.05 % gel Apply 1 Application to affected area(s) 2 Times a Day. Scalp (Patient not taking: Reported on 4/1/2019) 45 g 1   • ciclopirox (LOPROX) 0.77 % cream Daily to the affected area on the body (Patient not taking: Reported on 4/1/2019) 1 Tube 2   • albuterol (PROAIR HFA) 108 (90 Base) MCG/ACT Aero Soln inhalation aerosol Inhale 2 Puffs by mouth every 6 hours as needed for Shortness of Breath. 8.5 g 0   • fluticasone (FLONASE) 50 MCG/ACT nasal spray Spray 1 Spray in nose 1 time daily as needed.       No current Epic-ordered facility-administered medications on file.        Allergies:  Latex    Health Maintenance: Completed    ROS:  Pulm: no sob, no cough  CV: no chest pain, no palpitations    Objective:       Exam:  /58 (BP Location: Left arm, Patient Position: Sitting, BP Cuff Size: Adult)   Pulse 96   Temp 37.5 °C (99.5 °F) (Temporal)   Ht 1.803 m (5' 10.98\")   Wt 51.3 kg (113 lb 1.5 oz)   LMP 04/01/2018   SpO2 97%   BMI 15.78 kg/m²  Body mass index is 15.78 kg/m².      General: Cachectic. No distress.  HEAD: NCAT  EYES: conjunctiva clear, lids without ptosis, pupils equal  and reactive to light  EARS: ears normal shape and contour  MOUTH: oropharynx is without erythema, edema or exudates.   Neck: Supple without masses. Thyroid is not enlarged. Normal ROM  Pulmonary: Clear to ausculation.  Normal effort. No rales, ronchi, or wheezing.  Cardiovascular: Regular rate and rhythm, no " murmur. No LE edema  Neurologic: Grossly normal, no focal deficits  Lymph: No cervical or supraclavicular lymph nodes are palpable  Skin: Warm and dry.  No obvious lesions.  Musculoskeletal: Normal gait and station.   Psych: Normal mood and affect. Alert and oriented x3. Judgment and insight is normal.       Assessment & Plan:     23 y.o. female with the following -     1. Severe episode of recurrent major depressive disorder, without psychotic features (HCC)  Chronic problem, currently controlled.  Doing well on Prozac and BuSpar.  Referral sent to behavioral health she would like to get establish as soon as possible.  - REFERRAL TO BEHAVIORAL HEALTH    2. Anorexia nervosa with bulimia  Chronic problem, uncontrolled.  BMI is 15.7 today.  No significant change since her last visit.  Patient is restricted with her food.  Referral sent to behavioral health.  - REFERRAL TO BEHAVIORAL HEALTH      Follow-up with Dr. Dunaway in 1 month    Please note that this dictation was created using voice recognition software. I have made every reasonable attempt to correct obvious errors, but I expect that there are errors of grammar and possibly content that I did not discover before finalizing the note.

## 2019-06-24 ENCOUNTER — OFFICE VISIT (OUTPATIENT)
Dept: MEDICAL GROUP | Facility: MEDICAL CENTER | Age: 24
End: 2019-06-24
Payer: COMMERCIAL

## 2019-06-24 VITALS
HEIGHT: 71 IN | HEART RATE: 89 BPM | SYSTOLIC BLOOD PRESSURE: 110 MMHG | DIASTOLIC BLOOD PRESSURE: 62 MMHG | WEIGHT: 119 LBS | BODY MASS INDEX: 16.66 KG/M2 | RESPIRATION RATE: 14 BRPM | TEMPERATURE: 98.4 F | OXYGEN SATURATION: 96 %

## 2019-06-24 DIAGNOSIS — F50.02 ANOREXIA NERVOSA WITH BULIMIA: ICD-10-CM

## 2019-06-24 DIAGNOSIS — F33.2 SEVERE EPISODE OF RECURRENT MAJOR DEPRESSIVE DISORDER, WITHOUT PSYCHOTIC FEATURES (HCC): ICD-10-CM

## 2019-06-24 DIAGNOSIS — G62.9 NEUROPATHY: ICD-10-CM

## 2019-06-24 DIAGNOSIS — Z63.79 STRESS DUE TO ILLNESS OF FAMILY MEMBER: ICD-10-CM

## 2019-06-24 PROBLEM — D48.9 NEOPLASM OF UNCERTAIN BEHAVIOR: Status: RESOLVED | Noted: 2018-01-08 | Resolved: 2019-06-24

## 2019-06-24 PROBLEM — H53.9 VISION CHANGES: Status: RESOLVED | Noted: 2018-10-18 | Resolved: 2019-06-24

## 2019-06-24 PROCEDURE — 99214 OFFICE O/P EST MOD 30 MIN: CPT | Performed by: FAMILY MEDICINE

## 2019-06-24 RX ORDER — FLUOXETINE 10 MG/1
10 CAPSULE ORAL DAILY
Qty: 90 CAP | Refills: 3 | Status: SHIPPED | OUTPATIENT
Start: 2019-06-24 | End: 2019-06-24 | Stop reason: SDUPTHER

## 2019-06-24 RX ORDER — FLUOXETINE HYDROCHLORIDE 20 MG/1
20 CAPSULE ORAL DAILY
Qty: 90 CAP | Refills: 3 | Status: SHIPPED
Start: 2019-06-24 | End: 2020-06-24

## 2019-06-24 NOTE — PROGRESS NOTES
This medical record contains text that has been entered with the assistance of computer voice recognition and dictation software.  Therefore, it may contain unintended errors in text, spelling, punctuation, or grammar        Chief Complaint   Patient presents with   • Medication Refill     birth control and prozac flonase        St Kelsea Rey is a 23 y.o. female here evaluation and management of:    Follow up depression, contraception, asthma, bulimia     Current Outpatient Prescriptions   Medication Sig Dispense Refill   • Norethin-Eth Estrad-Fe Biphas (LO LOESTRIN FE) 1 MG-10 MCG / 10 MCG Tab Take 1 Tab by mouth every day. 84 Tab 3   • FLUoxetine (PROZAC) 20 MG Cap Take 1 Cap by mouth every day. 90 Cap 3   • busPIRone (BUSPAR) 5 MG tablet Take 5 mg by mouth 2 times a day.     • fluticasone (FLONASE) 50 MCG/ACT nasal spray Spray 1 Spray in nose 1 time daily as needed.     • albuterol (PROAIR HFA) 108 (90 Base) MCG/ACT Aero Soln inhalation aerosol Inhale 2 Puffs by mouth every 6 hours as needed for Shortness of Breath. 8.5 g 0     No current facility-administered medications for this visit.      Patient Active Problem List    Diagnosis Date Noted   • Neuropathy (HCC) 10/18/2018   • Exercise-induced asthma 01/08/2018   • Panic attacks 01/08/2018   • Stress due to illness of family member 01/08/2018   • Anxiety 11/13/2017   • Severe episode of recurrent major depressive disorder, without psychotic features (HCC) 11/13/2017   • Anorexia nervosa with bulimia 11/13/2017   • Attention deficit hyperactivity disorder (ADHD), combined type 10/09/2017   • Environmental allergies 10/09/2017     Past Surgical History:   Procedure Laterality Date   • DILATION AND CURETTAGE  12/13/2017    Procedure: DILATION AND CURETTAGE- FOR SKENE'S CYST REMOVAL VS MARSUPIALIZATION;  Surgeon: Luz Jeronimo M.D.;  Location: SURGERY [facility];  Service: Gynecology      Social History   Substance Use Topics   • Smoking  "status: Current Every Day Smoker     Packs/day: 0.25     Years: 3.00     Types: Cigarettes   • Smokeless tobacco: Never Used   • Alcohol use Yes      Comment: 1 per month     Family History   Problem Relation Age of Onset   • Cancer Mother         Thyroid CA   • Hypertension Father            ROS  No nausea  all review of system completed and negative except for those listed above     Objective:     /62 (BP Location: Left arm, Patient Position: Sitting, BP Cuff Size: Adult)   Pulse 89   Temp 36.9 °C (98.4 °F) (Temporal)   Resp 14   Ht 1.803 m (5' 10.98\")   Wt 54 kg (119 lb)   SpO2 96%  Body mass index is 16.61 kg/m².  Physical Exam:        GEN: comfortable, alert and oriented, well nourished, well developed, in no apparent distress   HEENT: NCAT, eyes: pupils equal and reactive, sclera white, EOMIT, good dentition  HEART: limbs warm and well perfused, regular rate, no JVD, no lower extremity edema  LUNGS: speaking in full sentences, not in apparent respiratory distress, no audible wheezes  MSK: normal tone and bulk, no swelling of the joints, gait steady and normal           Assessment and Plan:   The following treatment plan was discussed        Problem List Items Addressed This Visit     Severe episode of recurrent major depressive disorder, without psychotic features (HCC)     Doing well on prozac and buspar   Was recently hospitalized  I can refill these for her today   Increase prozac to 20mg      She takes buspar prn only                Relevant Medications    FLUoxetine (PROZAC) 20 MG Cap    Anorexia nervosa with bulimia     Gained some weight                Stress due to illness of family member     Less stressed now   Taking a semester off school   Mom is doing better health wise                   Neuropathy (HCC)     She has been referred to neuro              Relevant Medications    FLUoxetine (PROZAC) 20 MG Cap                Instructed to follow up if symptoms worsen or fail to improve, " ER/UC precautions discussed as well    Kathe Dunaway MD  [facility], [address]   [place] [address]  Phone: [phone]

## 2019-06-24 NOTE — ASSESSMENT & PLAN NOTE
Doing well on prozac and buspar   Was recently hospitalized  I can refill these for her today   Increase prozac to 20mg      She takes buspar prn only

## 2019-08-10 ENCOUNTER — OFFICE VISIT (OUTPATIENT)
Dept: URGENT CARE | Facility: CLINIC | Age: 24
End: 2019-08-10
Payer: COMMERCIAL

## 2019-08-10 VITALS
OXYGEN SATURATION: 99 % | RESPIRATION RATE: 16 BRPM | DIASTOLIC BLOOD PRESSURE: 86 MMHG | HEIGHT: 67 IN | SYSTOLIC BLOOD PRESSURE: 100 MMHG | HEART RATE: 80 BPM | BODY MASS INDEX: 18.83 KG/M2 | WEIGHT: 120 LBS | TEMPERATURE: 98.9 F

## 2019-08-10 DIAGNOSIS — J03.90 EXUDATIVE TONSILLITIS: ICD-10-CM

## 2019-08-10 LAB
HETEROPH AB SER QL LA: NEGATIVE
INT CON NEG: NEGATIVE
INT CON NEG: NEGATIVE
INT CON POS: POSITIVE
INT CON POS: POSITIVE
S PYO AG THROAT QL: NEGATIVE

## 2019-08-10 PROCEDURE — 87880 STREP A ASSAY W/OPTIC: CPT | Performed by: PHYSICIAN ASSISTANT

## 2019-08-10 PROCEDURE — 86308 HETEROPHILE ANTIBODY SCREEN: CPT | Performed by: PHYSICIAN ASSISTANT

## 2019-08-10 PROCEDURE — 99214 OFFICE O/P EST MOD 30 MIN: CPT | Performed by: PHYSICIAN ASSISTANT

## 2019-08-10 RX ORDER — DEXAMETHASONE 4 MG/1
8 TABLET ORAL ONCE
Qty: 2 TAB | Refills: 0 | Status: SHIPPED | OUTPATIENT
Start: 2019-08-10 | End: 2019-08-10

## 2019-08-10 RX ORDER — AMOXICILLIN 500 MG/1
500 CAPSULE ORAL 2 TIMES DAILY
Qty: 20 CAP | Refills: 0 | Status: SHIPPED | OUTPATIENT
Start: 2019-08-10 | End: 2019-08-20

## 2019-08-10 ASSESSMENT — ENCOUNTER SYMPTOMS
FEVER: 1
EYE PAIN: 0
SHORTNESS OF BREATH: 0
COUGH: 0
HEMOPTYSIS: 0
EYE DISCHARGE: 0
CHILLS: 0
WHEEZING: 0
SWOLLEN GLANDS: 1
STRIDOR: 0
EYE REDNESS: 0
HOARSE VOICE: 1
SORE THROAT: 1
SPUTUM PRODUCTION: 0
HEADACHES: 0
PALPITATIONS: 0

## 2019-08-10 NOTE — PROGRESS NOTES
Subjective:      St Kelsea Rey is a 23 y.o. female who presents with Oral Swelling (swollen tonisls, hurts to swallow  x 1 day )            Pharyngitis    This is a new problem. The current episode started yesterday. The problem has been gradually worsening. Associated symptoms include congestion, ear pain, a hoarse voice and swollen glands. Pertinent negatives include no coughing, ear discharge, headaches, shortness of breath or stridor. She has tried nothing for the symptoms.       Review of Systems   Constitutional: Positive for fever and malaise/fatigue. Negative for chills.   HENT: Positive for congestion, ear pain, hoarse voice and sore throat. Negative for ear discharge.    Eyes: Negative for pain, discharge and redness.   Respiratory: Negative for cough, hemoptysis, sputum production, shortness of breath, wheezing and stridor.    Cardiovascular: Negative for chest pain and palpitations.   Skin: Negative for itching and rash.   Neurological: Negative for headaches.   All other systems reviewed and are negative.    PMH:  has a past medical history of ADHD (attention deficit hyperactivity disorder), Allergy, Anemia, Asthma, Depression, Fear of needles, Gynecological disorder, H/O dizziness, History of fainting, Infectious disease, Low blood phosphate, and QT prolongation (1/2009).  MEDS:   Current Outpatient Medications:   •  Norethin-Eth Estrad-Fe Biphas (LO LOESTRIN FE) 1 MG-10 MCG / 10 MCG Tab, Take 1 Tab by mouth every day., Disp: 84 Tab, Rfl: 3  •  FLUoxetine (PROZAC) 20 MG Cap, Take 1 Cap by mouth every day., Disp: 90 Cap, Rfl: 3  •  busPIRone (BUSPAR) 5 MG tablet, Take 5 mg by mouth 2 times a day., Disp: , Rfl:   •  albuterol (PROAIR HFA) 108 (90 Base) MCG/ACT Aero Soln inhalation aerosol, Inhale 2 Puffs by mouth every 6 hours as needed for Shortness of Breath., Disp: 8.5 g, Rfl: 0  •  fluticasone (FLONASE) 50 MCG/ACT nasal spray, Spray 1 Spray in nose 1 time daily as needed., Disp: , Rfl:  "  ALLERGIES:   Allergies   Allergen Reactions   • Latex Hives, Rash and Itching     Rash at contact site     SURGHX:   Past Surgical History:   Procedure Laterality Date   • DILATION AND CURETTAGE  12/13/2017    Procedure: DILATION AND CURETTAGE- FOR SKENE'S CYST REMOVAL VS MARSUPIALIZATION;  Surgeon: Luz Jeronimo M.D.;  Location: SURGERY [facility];  Service: Gynecology     SOCHX:  reports that she has quit smoking. Her smoking use included cigarettes. She has a 0.75 pack-year smoking history. She has never used smokeless tobacco. She reports that she drinks alcohol. She reports that she has current or past drug history. Drug: Marijuana.  FH: Family history was reviewed, no pertinent findings to report  Medications, Allergies, and current problem list reviewed today in Epic       Objective:     /86   Pulse 80   Temp 37.2 °C (98.9 °F) (Temporal)   Resp 16   Ht 1.702 m (5' 7\")   Wt 54.4 kg (120 lb)   SpO2 99%   BMI 18.79 kg/m²      Physical Exam   Constitutional: She is oriented to person, place, and time. She appears well-developed and well-nourished.  Non-toxic appearance. She does not have a sickly appearance. She does not appear ill. No distress.   HENT:   Head: Normocephalic and atraumatic.   Right Ear: Hearing, tympanic membrane, external ear and ear canal normal.   Left Ear: Hearing, tympanic membrane, external ear and ear canal normal.   Nose: Nose normal.   Mouth/Throat: Uvula is midline and mucous membranes are normal. Oropharyngeal exudate and posterior oropharyngeal erythema present. No posterior oropharyngeal edema or tonsillar abscesses.   Neck: Normal range of motion. Neck supple. No JVD present. No tracheal deviation present. No thyromegaly present.   Cardiovascular: Regular rhythm and normal heart sounds. Exam reveals no gallop and no friction rub.   No murmur heard.  Pulmonary/Chest: Effort normal and breath sounds normal. No stridor. No respiratory distress. She has no " wheezes. She has no rales. She exhibits no tenderness.   Lymphadenopathy:     She has cervical adenopathy.   Neurological: She is alert and oriented to person, place, and time.   Skin: Skin is warm and dry.   Psychiatric: She has a normal mood and affect. Her behavior is normal. Judgment and thought content normal.   Vitals reviewed.           Rapid Strep: NEG  Rapid Mono: NEG  Centor Criteria: 5/5  Assessment/Plan:     1. Exudative tonsillitis  POCT Rapid Strep A    amoxicillin (AMOXIL) 500 MG Cap    dexamethasone (DECADRON) 4 MG Tab    POCT Mononucleosis (mono)     Tx based from appearance of tonsils and + centor criteria    Differential diagnosis, natural history, supportive care discussed. Follow-up with primary care provider within 7-10 days, emergency room precautions discussed.  Patient and/or family appears understanding of information.  Handout and review of patients diagnosis and treatment was discussed extensively.

## 2019-08-14 ENCOUNTER — TELEPHONE (OUTPATIENT)
Dept: MEDICAL GROUP | Facility: MEDICAL CENTER | Age: 24
End: 2019-08-14

## 2019-08-14 NOTE — TELEPHONE ENCOUNTER
Patient wrote in stating that she misspoke and is not take the Lo Loestrin birth control but Valdo. I could not find Valdo in her chart to reorder. Can you refill the Valdo please?

## 2019-08-15 RX ORDER — NORETHINDRONE ACETATE AND ETHINYL ESTRADIOL 1.5-30(21)
1 KIT ORAL DAILY
Qty: 84 TAB | Refills: 3 | Status: SHIPPED
Start: 2019-08-15 | End: 2020-06-24

## 2020-06-24 ENCOUNTER — HOSPITAL ENCOUNTER (OUTPATIENT)
Facility: MEDICAL CENTER | Age: 25
End: 2020-06-24
Attending: FAMILY MEDICINE
Payer: COMMERCIAL

## 2020-06-24 ENCOUNTER — OFFICE VISIT (OUTPATIENT)
Dept: MEDICAL GROUP | Facility: MEDICAL CENTER | Age: 25
End: 2020-06-24
Payer: COMMERCIAL

## 2020-06-24 VITALS
RESPIRATION RATE: 16 BRPM | TEMPERATURE: 97.9 F | DIASTOLIC BLOOD PRESSURE: 58 MMHG | SYSTOLIC BLOOD PRESSURE: 108 MMHG | BODY MASS INDEX: 16.8 KG/M2 | HEIGHT: 71 IN | HEART RATE: 96 BPM | OXYGEN SATURATION: 97 % | WEIGHT: 120 LBS

## 2020-06-24 DIAGNOSIS — Z11.3 ROUTINE SCREENING FOR STI (SEXUALLY TRANSMITTED INFECTION): ICD-10-CM

## 2020-06-24 DIAGNOSIS — Z23 NEED FOR VACCINATION: ICD-10-CM

## 2020-06-24 DIAGNOSIS — Z00.00 ANNUAL PHYSICAL EXAM: ICD-10-CM

## 2020-06-24 DIAGNOSIS — Z30.41 ENCOUNTER FOR SURVEILLANCE OF CONTRACEPTIVE PILLS: ICD-10-CM

## 2020-06-24 PROCEDURE — 99395 PREV VISIT EST AGE 18-39: CPT | Mod: 25 | Performed by: FAMILY MEDICINE

## 2020-06-24 PROCEDURE — 90651 9VHPV VACCINE 2/3 DOSE IM: CPT | Performed by: FAMILY MEDICINE

## 2020-06-24 PROCEDURE — 90621 MENB-FHBP VACC 2/3 DOSE IM: CPT | Performed by: FAMILY MEDICINE

## 2020-06-24 PROCEDURE — 87591 N.GONORRHOEAE DNA AMP PROB: CPT

## 2020-06-24 PROCEDURE — 90715 TDAP VACCINE 7 YRS/> IM: CPT | Performed by: FAMILY MEDICINE

## 2020-06-24 PROCEDURE — 90471 IMMUNIZATION ADMIN: CPT | Performed by: FAMILY MEDICINE

## 2020-06-24 PROCEDURE — 90472 IMMUNIZATION ADMIN EACH ADD: CPT | Performed by: FAMILY MEDICINE

## 2020-06-24 PROCEDURE — 87491 CHLMYD TRACH DNA AMP PROBE: CPT

## 2020-06-24 RX ORDER — NORETHINDRONE ACETATE AND ETHINYL ESTRADIOL, ETHINYL ESTRADIOL AND FERROUS FUMARATE 1MG-10(24)
1 KIT ORAL DAILY
Qty: 84 TAB | Refills: 3 | Status: SHIPPED | OUTPATIENT
Start: 2020-06-24 | End: 2020-10-24 | Stop reason: SDUPTHER

## 2020-06-24 RX ORDER — FLUTICASONE PROPIONATE 50 MCG
1 SPRAY, SUSPENSION (ML) NASAL
Qty: 16 G | Refills: 11 | Status: SHIPPED | OUTPATIENT
Start: 2020-06-24 | End: 2021-08-31 | Stop reason: SDUPTHER

## 2020-06-24 ASSESSMENT — PATIENT HEALTH QUESTIONNAIRE - PHQ9
8. MOVING OR SPEAKING SO SLOWLY THAT OTHER PEOPLE COULD HAVE NOTICED. OR THE OPPOSITE, BEING SO FIGETY OR RESTLESS THAT YOU HAVE BEEN MOVING AROUND A LOT MORE THAN USUAL: MORE THAN HALF THE DAYS
1. LITTLE INTEREST OR PLEASURE IN DOING THINGS: SEVERAL DAYS
7. TROUBLE CONCENTRATING ON THINGS, SUCH AS READING THE NEWSPAPER OR WATCHING TELEVISION: NEARLY EVERY DAY
9. THOUGHTS THAT YOU WOULD BE BETTER OFF DEAD, OR OF HURTING YOURSELF: NOT AT ALL
5. POOR APPETITE OR OVEREATING: SEVERAL DAYS
SUM OF ALL RESPONSES TO PHQ9 QUESTIONS 1 AND 2: 2
4. FEELING TIRED OR HAVING LITTLE ENERGY: NEARLY EVERY DAY
2. FEELING DOWN, DEPRESSED, IRRITABLE, OR HOPELESS: SEVERAL DAYS
3. TROUBLE FALLING OR STAYING ASLEEP OR SLEEPING TOO MUCH: MORE THAN HALF THE DAYS
6. FEELING BAD ABOUT YOURSELF - OR THAT YOU ARE A FAILURE OR HAVE LET YOURSELF OR YOUR FAMILY DOWN: SEVERAL DAYS
SUM OF ALL RESPONSES TO PHQ QUESTIONS 1-9: 14

## 2020-06-24 ASSESSMENT — LIFESTYLE VARIABLES
DO YOU EVER FORGET THINGS YOU DID WHILE USING ALCOHOL OR DRUGS: NO
HAVE YOU EVER RIDDEN IN A CAR DRIVEN BY SOMEONE WHO WAS HIGH OR HAD BEEN USING ALCOHOL OR DRUGS: NO
DURING THE PAST 12 MONTHS, ON HOW MANY DAYS DID YOU USE ANY TOBACCO OR NICOTINE PRODUCTS: 365
PART A TOTAL SCORE: 382
DO YOU EVER USE ALCOHOL OR DRUGS WHILE YOU ARE BY YOURSELF ALONE: NO
DURING THE PAST 12 MONTHS, ON HOW MANY DAYS DID YOU USE ANYTHING ELSE TO GET HIGH: 0
HAVE YOU EVER GOTTEN IN TROUBLE WHILE YOU WERE USING ALCOHOL OR DRUGS: YES
DURING THE PAST 12 MONTHS, ON HOW MANY DAYS DID YOU USE ANY MARIJUANA: 2
DO YOU EVER USE ALCOHOL OR DRUGS TO RELAX, FEEL BETTER ABOUT YOURSELF, OR FIT IN: NO
DURING THE PAST 12 MONTHS, ON HOW MANY DAYS DID YOU DRINK MORE THAN A FEW SIPS OF BEER, WINE, OR ANY DRINK CONTAINING ALCOHOL: 15
DO YOUR FAMILY OR FRIENDS EVER TELL YOU THAT YOU SHOULD CUT DOWN ON YOUR DRINKING OR DRUG USE: NO

## 2020-06-24 NOTE — ASSESSMENT & PLAN NOTE
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

## 2020-06-24 NOTE — PROGRESS NOTES
This medical record contains text that has been entered with the assistance of computer voice recognition and dictation software.  Therefore, it may contain unintended errors in text, spelling, punctuation, or grammar        Chief Complaint   Patient presents with   • Immunizations       St Kelsea Rey is a 24 y.o. female here evaluation and management of: prev visit annual physical       HPI:     Feels well in her usual state of health   Her and her male partner are both on unemployement so doing ok financially but bored     Current Outpatient Medications   Medication Sig Dispense Refill   • Norethin-Eth Estrad-Fe Biphas (LO LOESTRIN FE) 1 MG-10 MCG / 10 MCG Tab Take 1 Tab by mouth every day. 84 Tab 3   • fluticasone (FLONASE) 50 MCG/ACT nasal spray Spray 1 Spray in nose 1 time daily as needed. 16 g 11   • albuterol (PROAIR HFA) 108 (90 Base) MCG/ACT Aero Soln inhalation aerosol Inhale 2 Puffs by mouth every 6 hours as needed for Shortness of Breath. 8.5 g 0     No current facility-administered medications for this visit.      Patient Active Problem List    Diagnosis Date Noted   • Annual physical exam 06/24/2020   • Neuropathy 10/18/2018   • Exercise-induced asthma 01/08/2018   • Panic attacks 01/08/2018   • Stress due to illness of family member 01/08/2018   • Anxiety 11/13/2017   • Severe episode of recurrent major depressive disorder, without psychotic features (HCC) 11/13/2017   • Anorexia nervosa with bulimia 11/13/2017   • Attention deficit hyperactivity disorder (ADHD), combined type 10/09/2017   • Environmental allergies 10/09/2017     Past Surgical History:   Procedure Laterality Date   • DILATION AND CURETTAGE  12/13/2017    Procedure: DILATION AND CURETTAGE- FOR SKENE'S CYST REMOVAL VS MARSUPIALIZATION;  Surgeon: Luz Jeronimo M.D.;  Location: SURGERY [facility];  Service: Gynecology      Social History     Tobacco Use   • Smoking status: Current Every Day Smoker     Packs/day:  "0.20     Years: 3.00     Pack years: 0.60     Types: Cigarettes   • Smokeless tobacco: Never Used   Substance Use Topics   • Alcohol use: Yes     Comment: 1 per month   • Drug use: Not Currently     Types: Marijuana     Family History   Problem Relation Age of Onset   • Cancer Mother         Thyroid CA   • Hypertension Father            ROS    all review of system completed and negative except for those listed above     Objective:     /58 (BP Location: Left arm, Patient Position: Sitting, BP Cuff Size: Adult)   Pulse 96   Temp 36.6 °C (97.9 °F) (Temporal)   Resp 16   Ht 1.803 m (5' 11\")   Wt 54.4 kg (120 lb)   SpO2 97%  Body mass index is 16.74 kg/m².  Physical Exam:    Constitutional: Alert, no distress.  Skin: Warm, dry, good turgor, no rashes in visible areas.  Eye: Equal, round and reactive, conjunctiva clear, lids normal.  ENMT: Lips without lesions, good dentition, oropharynx clear.  Neck: Trachea midline, no masses, no thyromegaly. No cervical or supraclavicular lymphadenopathy.  Respiratory: Unlabored respiratory effort, lungs clear to auscultation, no wheezes, no ronchi.  Cardiovascular: Normal S1, S2, no murmur, no edema.  Abdomen: Soft, non-tender, no masses, no hepatosplenomegaly.  Psych: Alert and oriented x3, normal affect and mood.          Assessment and Plan:   The following treatment plan was discussed        Problem List Items Addressed This Visit     Annual physical exam     Age appropriate prev health discussed   gc ct screen   Vaccines   Renew contraception   Can check for varicella immunity   She plans to enroll in on line school                Other Visit Diagnoses     Need for vaccination        Relevant Orders    Tdap Vaccine =>6YO IM (Completed)    Meningococcal Vaccine Serogroup B 2-3 Dose (TRUMENBA) (Completed)    9VHPV Vaccine 2-3 Dose IM (Completed)    Routine screening for STI (sexually transmitted infection)        Relevant Orders    Chlamydia/GC PCR Urine Or Swab    " Encounter for surveillance of contraceptive pills        Relevant Medications    Norethin-Eth Estrad-Fe Biphas (LO LOESTRIN FE) 1 MG-10 MCG / 10 MCG Tab                Instructed to follow up if symptoms worsen or fail to improve, ER/UC precautions discussed as well    Kathe Dunaway MD  [facility], Family Medicine   [address] [place] [address] [address]   [place] [address]  Phone: [phone]

## 2020-06-25 LAB
C TRACH DNA SPEC QL NAA+PROBE: NEGATIVE
N GONORRHOEA DNA SPEC QL NAA+PROBE: NEGATIVE
SPECIMEN SOURCE: NORMAL

## 2020-08-06 ENCOUNTER — TELEMEDICINE (OUTPATIENT)
Dept: MEDICAL GROUP | Facility: MEDICAL CENTER | Age: 25
End: 2020-08-06
Payer: COMMERCIAL

## 2020-08-06 VITALS — HEART RATE: 100 BPM | WEIGHT: 120 LBS | HEIGHT: 71 IN | BODY MASS INDEX: 16.8 KG/M2

## 2020-08-06 DIAGNOSIS — Z20.822 EXPOSURE TO COVID-19 VIRUS: ICD-10-CM

## 2020-08-06 PROCEDURE — 99213 OFFICE O/P EST LOW 20 MIN: CPT | Mod: 95,CS,CR | Performed by: FAMILY MEDICINE

## 2020-08-06 NOTE — PROGRESS NOTES
Telemedicine Visit: New Patient     This encounter was conducted via [employer].   Verbal consent was obtained. Patient's identity was verified.    Subjective:     CC: request covid test   St Kelsea Rey is a 24 y.o. female presenting to establish care and to discuss the evaluation and management of    Possible exposure to covid   See a/p for more details     ROS  All systems reviewed and neg other than those mentioned in a/p    Allergies   Allergen Reactions   • Latex Hives, Rash and Itching     Rash at contact site       Current medicines (including changes today)  Current Outpatient Medications   Medication Sig Dispense Refill   • Norethin-Eth Estrad-Fe Biphas (LO LOESTRIN FE) 1 MG-10 MCG / 10 MCG Tab Take 1 Tab by mouth every day. 84 Tab 3   • fluticasone (FLONASE) 50 MCG/ACT nasal spray Spray 1 Spray in nose 1 time daily as needed. 16 g 11   • albuterol (PROAIR HFA) 108 (90 Base) MCG/ACT Aero Soln inhalation aerosol Inhale 2 Puffs by mouth every 6 hours as needed for Shortness of Breath. 8.5 g 0     No current facility-administered medications for this visit.        She  has a past medical history of ADHD (attention deficit hyperactivity disorder), Allergy, Anemia, Asthma, Depression, Fear of needles, Gynecological disorder, H/O dizziness, History of fainting, Infectious disease, Low blood phosphate, and QT prolongation (1/2009).  She  has a past surgical history that includes dilation and curettage (12/13/2017).      Family History   Problem Relation Age of Onset   • Cancer Mother         Thyroid CA   • Hypertension Father      Family Status   Relation Name Status   • Mo  Alive   • Fa  Alive   • Sis 2 Alive   • Bro 1 Alive       Patient Active Problem List    Diagnosis Date Noted   • Exposure to COVID-19 virus 08/06/2020   • Annual physical exam 06/24/2020   • Neuropathy 10/18/2018   • Exercise-induced asthma 01/08/2018   • Panic attacks 01/08/2018   • Stress due to illness of family member  "01/08/2018   • Anxiety 11/13/2017   • Severe episode of recurrent major depressive disorder, without psychotic features (HCC) 11/13/2017   • Anorexia nervosa with bulimia 11/13/2017   • Attention deficit hyperactivity disorder (ADHD), combined type 10/09/2017   • Environmental allergies 10/09/2017          Objective:   Pulse 100 Comment: pt stated  Ht 1.803 m (5' 11\") Comment: pt stated  Wt 54.4 kg (120 lb) Comment: pt stated  BMI 16.74 kg/m²     Physical Exam:  Constitutional: Alert, no distress, well-groomed.  Skin: No rashes in visible areas.  Eye: Round. Conjunctiva clear, lids normal. No icterus.   ENMT: Lips pink without lesions, good dentition, moist mucous membranes. Phonation normal.  Neck: No masses, no thyromegaly. Moves freely without pain.  CV: Pulse as reported by patient  Respiratory: Unlabored respiratory effort, no cough or audible wheeze  Psych: Alert and oriented x3, normal affect and mood.       Assessment and Plan:   The following treatment plan was discussed:     1. Exposure to COVID-19 virus  - COVID/SARS COV-2 PCR; Future    Problem List Items Addressed This Visit     Exposure to COVID-19 virus     Sitting in the same room as a person no mask   > 3 feet > 15 min   That person went to [facility] for interview and screened + for fever tested + for covid     Exposure was 4 days ago     Mild symptoms only nausea, gagging loose stool   No fever no respiratory symptoms     I suggest that she do seek testing, I suggest that she socially distance to minimize potential exposure, if her symptoms remain mild I suggest she wait until day 8 for most accurate results of COVID testing, if she develops severe symptoms she can seek testing earlier    Instructions for [facility] through lab discussed     15+ min face to face >50% spent discussing plan /coordinating care                  Relevant Orders    COVID/SARS COV-2 PCR        Kathe Dunaway M.D.        Follow-up: No follow-ups on file.           "

## 2020-08-06 NOTE — ASSESSMENT & PLAN NOTE
Sitting in the same room as a person no mask   > 3 feet > 15 min   That person went to [facility] for interview and screened + for fever tested + for covid     Exposure was 4 days ago     Mild symptoms only nausea, gagging loose stool   No fever no respiratory symptoms     I suggest that she do seek testing, I suggest that she socially distance to minimize potential exposure, if her symptoms remain mild I suggest she wait until day 8 for most accurate results of COVID testing, if she develops severe symptoms she can seek testing earlier    Instructions for [facility] DR[facility] through lab discussed     15+ min face to face >50% spent discussing plan /coordinating care

## 2020-10-24 DIAGNOSIS — Z30.41 ENCOUNTER FOR SURVEILLANCE OF CONTRACEPTIVE PILLS: ICD-10-CM

## 2020-10-26 RX ORDER — NORETHINDRONE ACETATE AND ETHINYL ESTRADIOL, ETHINYL ESTRADIOL AND FERROUS FUMARATE 1MG-10(24)
1 KIT ORAL DAILY
Qty: 84 TAB | Refills: 3 | Status: SHIPPED | OUTPATIENT
Start: 2020-10-26 | End: 2021-03-23 | Stop reason: SDUPTHER

## 2021-02-23 ENCOUNTER — HOSPITAL ENCOUNTER (OUTPATIENT)
Dept: LAB | Facility: MEDICAL CENTER | Age: 26
End: 2021-02-23
Attending: PHYSICIAN ASSISTANT
Payer: COMMERCIAL

## 2021-02-23 LAB
25(OH)D3 SERPL-MCNC: 21 NG/ML (ref 30–100)
ALBUMIN SERPL BCP-MCNC: 4.3 G/DL (ref 3.2–4.9)
ALBUMIN/GLOB SERPL: 1.5 G/DL
ALP SERPL-CCNC: 50 U/L (ref 30–99)
ALT SERPL-CCNC: 9 U/L (ref 2–50)
AMYLASE SERPL-CCNC: 58 U/L (ref 20–103)
ANION GAP SERPL CALC-SCNC: 9 MMOL/L (ref 7–16)
AST SERPL-CCNC: 15 U/L (ref 12–45)
BASOPHILS # BLD AUTO: 0.4 % (ref 0–1.8)
BASOPHILS # BLD: 0.03 K/UL (ref 0–0.12)
BILIRUB SERPL-MCNC: 0.4 MG/DL (ref 0.1–1.5)
BUN SERPL-MCNC: 11 MG/DL (ref 8–22)
CALCIUM SERPL-MCNC: 9.4 MG/DL (ref 8.5–10.5)
CHLORIDE SERPL-SCNC: 101 MMOL/L (ref 96–112)
CHOLEST SERPL-MCNC: 145 MG/DL (ref 100–199)
CO2 SERPL-SCNC: 21 MMOL/L (ref 20–33)
CREAT SERPL-MCNC: 0.58 MG/DL (ref 0.5–1.4)
EOSINOPHIL # BLD AUTO: 0.24 K/UL (ref 0–0.51)
EOSINOPHIL NFR BLD: 3.3 % (ref 0–6.9)
ERYTHROCYTE [DISTWIDTH] IN BLOOD BY AUTOMATED COUNT: 40.8 FL (ref 35.9–50)
EST. AVERAGE GLUCOSE BLD GHB EST-MCNC: 103 MG/DL
FASTING STATUS PATIENT QL REPORTED: NORMAL
GLOBULIN SER CALC-MCNC: 2.8 G/DL (ref 1.9–3.5)
GLUCOSE SERPL-MCNC: 97 MG/DL (ref 65–99)
HBA1C MFR BLD: 5.2 % (ref 4–5.6)
HCT VFR BLD AUTO: 40.2 % (ref 37–47)
HDLC SERPL-MCNC: 53 MG/DL
HGB BLD-MCNC: 13.6 G/DL (ref 12–16)
IMM GRANULOCYTES # BLD AUTO: 0.01 K/UL (ref 0–0.11)
IMM GRANULOCYTES NFR BLD AUTO: 0.1 % (ref 0–0.9)
IRON SATN MFR SERPL: 37 % (ref 15–55)
IRON SERPL-MCNC: 121 UG/DL (ref 40–170)
LDLC SERPL CALC-MCNC: 81 MG/DL
LIPASE SERPL-CCNC: 28 U/L (ref 11–82)
LYMPHOCYTES # BLD AUTO: 3.63 K/UL (ref 1–4.8)
LYMPHOCYTES NFR BLD: 49.5 % (ref 22–41)
MCH RBC QN AUTO: 31 PG (ref 27–33)
MCHC RBC AUTO-ENTMCNC: 33.8 G/DL (ref 33.6–35)
MCV RBC AUTO: 91.6 FL (ref 81.4–97.8)
MONOCYTES # BLD AUTO: 0.72 K/UL (ref 0–0.85)
MONOCYTES NFR BLD AUTO: 9.8 % (ref 0–13.4)
NEUTROPHILS # BLD AUTO: 2.7 K/UL (ref 2–7.15)
NEUTROPHILS NFR BLD: 36.9 % (ref 44–72)
NRBC # BLD AUTO: 0 K/UL
NRBC BLD-RTO: 0 /100 WBC
PHOSPHATE SERPL-MCNC: 3.7 MG/DL (ref 2.5–4.5)
PLATELET # BLD AUTO: 199 K/UL (ref 164–446)
PMV BLD AUTO: 12.4 FL (ref 9–12.9)
POTASSIUM SERPL-SCNC: 3.4 MMOL/L (ref 3.6–5.5)
PROT SERPL-MCNC: 7.1 G/DL (ref 6–8.2)
RBC # BLD AUTO: 4.39 M/UL (ref 4.2–5.4)
SODIUM SERPL-SCNC: 131 MMOL/L (ref 135–145)
T4 FREE SERPL-MCNC: 1.3 NG/DL (ref 0.93–1.7)
TIBC SERPL-MCNC: 331 UG/DL (ref 250–450)
TRIGL SERPL-MCNC: 57 MG/DL (ref 0–149)
TSH SERPL DL<=0.005 MIU/L-ACNC: 4.96 UIU/ML (ref 0.38–5.33)
UIBC SERPL-MCNC: 210 UG/DL (ref 110–370)
WBC # BLD AUTO: 7.3 K/UL (ref 4.8–10.8)

## 2021-02-23 PROCEDURE — 83036 HEMOGLOBIN GLYCOSYLATED A1C: CPT

## 2021-02-23 PROCEDURE — 82150 ASSAY OF AMYLASE: CPT

## 2021-02-23 PROCEDURE — 83550 IRON BINDING TEST: CPT

## 2021-02-23 PROCEDURE — 82306 VITAMIN D 25 HYDROXY: CPT

## 2021-02-23 PROCEDURE — 85025 COMPLETE CBC W/AUTO DIFF WBC: CPT

## 2021-02-23 PROCEDURE — 84439 ASSAY OF FREE THYROXINE: CPT

## 2021-02-23 PROCEDURE — 83540 ASSAY OF IRON: CPT

## 2021-02-23 PROCEDURE — 83690 ASSAY OF LIPASE: CPT

## 2021-02-23 PROCEDURE — 80053 COMPREHEN METABOLIC PANEL: CPT

## 2021-02-23 PROCEDURE — 84100 ASSAY OF PHOSPHORUS: CPT

## 2021-02-23 PROCEDURE — 80061 LIPID PANEL: CPT

## 2021-02-23 PROCEDURE — 36415 COLL VENOUS BLD VENIPUNCTURE: CPT

## 2021-02-23 PROCEDURE — 84443 ASSAY THYROID STIM HORMONE: CPT

## 2021-03-23 DIAGNOSIS — Z30.41 ENCOUNTER FOR SURVEILLANCE OF CONTRACEPTIVE PILLS: ICD-10-CM

## 2021-03-24 RX ORDER — NORETHINDRONE ACETATE AND ETHINYL ESTRADIOL, ETHINYL ESTRADIOL AND FERROUS FUMARATE 1MG-10(24)
1 KIT ORAL DAILY
Qty: 84 TABLET | Refills: 0 | Status: SHIPPED
Start: 2021-03-24 | End: 2021-05-17

## 2021-05-10 ENCOUNTER — TELEPHONE (OUTPATIENT)
Dept: MEDICAL GROUP | Facility: LAB | Age: 26
End: 2021-05-10

## 2021-05-10 NOTE — TELEPHONE ENCOUNTER
NEW PATIENT VISIT PRE-VISIT PLANNING    1.  [employer] Patient is checked in Patient Demographics?Yes    2.  Immunizations were updated in Epic using Reconcile Outside Information activity? Yes         3.  Is this appointment scheduled as a Hospital Follow-Up? No    4.  Patient is due for the following Health Maintenance Topics:   Health Maintenance Due   Topic Date Due   • COVID-19 Vaccine (1) Never done   • PAP SMEAR  Never done   • IMM MENINGOCOCCAL B VACCINE HEALTHY PATIENTS AGED 16 TO 23 (2 of 2 - MenB Trumenba 2-Dose Series) 12/24/2020       5.  Reviewed/Updated the following with patient:       •   Preferred Pharmacy? Yes       •   Preferred Lab? Yes       •   Preferred Communication? Yes       •   Allergies? Yes       •   Medications? YES. Was Abstract Encounter opened and chart updated? YES       6.  Updated Care Team?       •   DME Company (gait device, O2, CPAP, etc.) N\A       •   Other Specialists (eye doctor, derm, GYN, cardiology, endo, etc): YES    7.  AHA (Puls8) form printed for Provider? N/A

## 2021-05-17 ENCOUNTER — OFFICE VISIT (OUTPATIENT)
Dept: MEDICAL GROUP | Facility: LAB | Age: 26
End: 2021-05-17
Payer: COMMERCIAL

## 2021-05-17 VITALS
OXYGEN SATURATION: 98 % | BODY MASS INDEX: 16.8 KG/M2 | HEIGHT: 71 IN | WEIGHT: 120 LBS | HEART RATE: 106 BPM | DIASTOLIC BLOOD PRESSURE: 68 MMHG | SYSTOLIC BLOOD PRESSURE: 118 MMHG | RESPIRATION RATE: 13 BRPM | TEMPERATURE: 98.7 F

## 2021-05-17 DIAGNOSIS — G90.A POTS (POSTURAL ORTHOSTATIC TACHYCARDIA SYNDROME): ICD-10-CM

## 2021-05-17 DIAGNOSIS — Z76.89 ENCOUNTER TO ESTABLISH CARE: ICD-10-CM

## 2021-05-17 DIAGNOSIS — Z30.41 ENCOUNTER FOR SURVEILLANCE OF CONTRACEPTIVE PILLS: ICD-10-CM

## 2021-05-17 DIAGNOSIS — J45.990 EXERCISE-INDUCED ASTHMA: ICD-10-CM

## 2021-05-17 PROCEDURE — 99214 OFFICE O/P EST MOD 30 MIN: CPT | Performed by: FAMILY MEDICINE

## 2021-05-17 RX ORDER — NORETHINDRONE ACETATE AND ETHINYL ESTRADIOL .02; 1 MG/1; MG/1
1 TABLET ORAL DAILY
Qty: 90 TABLET | Refills: 3 | Status: SHIPPED | OUTPATIENT
Start: 2021-05-17 | End: 2021-06-04 | Stop reason: SDUPTHER

## 2021-05-17 ASSESSMENT — ENCOUNTER SYMPTOMS
FEVER: 0
CONSTIPATION: 0
NAUSEA: 0
PALPITATIONS: 0
NERVOUS/ANXIOUS: 0
DEPRESSION: 0
CHILLS: 0
HEADACHES: 0
BLURRED VISION: 0
VOMITING: 0
DIARRHEA: 0
DIZZINESS: 0
ABDOMINAL PAIN: 0

## 2021-05-17 ASSESSMENT — FIBROSIS 4 INDEX: FIB4 SCORE: 0.58

## 2021-05-17 NOTE — PROGRESS NOTES
Subjective:   St Kelsea Rey is a 25 y.o. female here today for   Chief Complaint   Patient presents with   • Establish Care   • Other     POTS, POSSIBLY REQUEST PARKING PASS    • Medication Management     GENERIC ORAL CONTRACEPTIVES, LO LOESTRIN FE       #Establish care   -Reviewed all past medical history, family history, social history.  -Reviewed all screening/vaccinations: Due for meningococcal, Pap smear.  -Diet and Exercise: Appropriate for age  -Tobacco, alcohol, recreational drug use: See chart  -Sexually active: Monogamous with male partner no concerns regarding relationship  -Occupation: Returning to school.    #POTS:  -Patient states that she has some symptoms that are concerning for postural orthostatic tachycardia syndrome.  She states that for the last several years she is always had difficulty standing, walking for extended period of time without becoming lightheaded and dizzy.  She states she did have syncopal episodes to this several years ago in her youth.  She also states that several weeks ago she has noticed symptoms becoming worse.  While trying to walk campus but [employer] she has daily episodes of feeling lightheaded, dizzy having symptoms of changes in her hearing, numbness/tingling in upper lower extremities during these episodes.  He also states is affected her ability to work out.  She also states that she has had occasional episodes of palpitations, tachycardia that was felt during these episodes as well.  Has had previous EKGs, echocardiogram was completed which have all been benign.  She has had previous lab work completed which has been negative.     #Exercise-induced asthma:  -Chronic addition, new to me currently treating with albuterol.  States that she only uses it 4-5 times a month.  Has no other concerns, no refill needed at this time.    #Contraception:  -Currently on low Loestrin to help repress periods as she has significant symptoms of premenstrual dysphoric disorder,  "dysmenorrhea which severely abrupt her life.  Insurance is no longer covering Lo Loestrin here today to discuss other possible medications.      Allergies   Allergen Reactions   • Latex Hives, Rash and Itching     Rash at contact site         Current medicines (including changes today)  Current Outpatient Medications   Medication Sig Dispense Refill   • fluticasone (FLONASE) 50 MCG/ACT nasal spray Spray 1 Spray in nose 1 time daily as needed. 16 g 11   • albuterol (PROAIR HFA) 108 (90 Base) MCG/ACT Aero Soln inhalation aerosol Inhale 2 Puffs by mouth every 6 hours as needed for Shortness of Breath. 8.5 g 0   • Norethin-Eth Estrad-Fe Biphas (LO LOESTRIN FE) 1 MG-10 MCG / 10 MCG Tab Take 1 tablet by mouth every day. 84 tablet 0     No current facility-administered medications for this visit.     She  has a past medical history of ADHD (attention deficit hyperactivity disorder), Allergy, Anemia, Asthma, Depression, Fear of needles, Gynecological disorder, H/O dizziness, History of fainting, Infectious disease, Low blood phosphate, and QT prolongation (1/2009).    ROS   Review of Systems   Constitutional: Negative for chills and fever.   HENT: Negative for hearing loss.    Eyes: Negative for blurred vision.   Cardiovascular: Negative for chest pain and palpitations.   Gastrointestinal: Negative for abdominal pain, constipation, diarrhea, nausea and vomiting.   Neurological: Negative for dizziness and headaches.   Psychiatric/Behavioral: Negative for depression and suicidal ideas. The patient is not nervous/anxious.         Objective:     Physical Exam:  /72   Pulse 82   Temp 37.1 °C (98.7 °F) (Temporal)   Resp 13   Ht 1.791 m (5' 10.5\")   Wt 54.4 kg (120 lb)   SpO2 98%  Body mass index is 16.97 kg/m².   Constitutional: Alert, no distress.  Skin: Warm, dry, good turgor, no rashes in visible areas.  Eye: Equal, round and reactive, conjunctiva clear, lids normal.  ENMT: TM's clear bilaterally, lips without " lesions, good dentition, oropharynx clear.  Neck: Trachea midline, no masses, no thyromegaly. No cervical or supraclavicular lymphadenopathy.  Respiratory: Unlabored respiratory effort, lungs clear to auscultation, no wheezes, no rhonchi.  Cardiovascular: Normal S1, S2, no murmur, no edema.  Abdomen: Soft, non-tender, no masses, no hepatosplenomegaly.  Psych: Alert and oriented x3, normal affect and mood.    Assessment and Plan:     1. Encounter to establish care  -All questions concerns were answered at this time.  -Vaccinations/screenings needed at this time: Pap smear, pneumococcal needed.  Will defer to next visit.  Patient does have OB/GYN.  -Labs reviewed, no concerns  -Discussed the importance of a healthy, Mediterranean style diet, routine exercise regimen.  -Discussed general safety measures including seatbelts, helmets, avoidance of smoking, sunscreen, hydration.  -Follow-up for general physical exam on a yearly basis, sooner if needed.    2. POTS (postural orthostatic tachycardia syndrome)  -Orthostatics were completed here in office.  While blood pressure was unremarkable, patient was found to have a heart rate that went from 87 lying to 90 when sitting to 106 when standing.  Physical examination was otherwise benign.  Given these findings as well as symptoms presenting there is some concerns for possible postural orthostatic hyper tachycardia syndrome.  At this time further evaluation of the need for any cardiac etiology.  Tilt able test has been ordered.  Reviewed EKGs and echocardiogram, unremarkable.  Patient will follow-up in a few months after tilt table test to be completed.  If still table test is negative then we may need to discuss symptomatic treatment for orthostatic hypotension/syncope.  - Tilt Table Test    3. Encounter for surveillance of contraceptive pills  -At this time we will change birth control over to Loestrin patient will continue taking sans placebos.  No other concerns or  questions this time, follow-up as needed.  - norethindrone-ethinyl estradiol (LOESTRIN 1/20, 21,) 1-20 MG-MCG per tablet; Take 1 tablet by mouth every day for 90 days.  Dispense: 90 tablet; Refill: 3    4. Exercise-induced asthma  -Stable.  We will continue use of albuterol.  Follow-up if symptoms worsen.        Followup: Return in about 2 months (around 7/17/2021).         PLEASE NOTE: This dictation was created using voice recognition software. I have made every reasonable attempt to correct obvious errors, but I expect that there are errors of grammar and possibly content that I did not discover before finalizing the note.

## 2021-06-04 ENCOUNTER — PATIENT MESSAGE (OUTPATIENT)
Dept: MEDICAL GROUP | Facility: LAB | Age: 26
End: 2021-06-04

## 2021-06-04 DIAGNOSIS — Z30.41 ENCOUNTER FOR SURVEILLANCE OF CONTRACEPTIVE PILLS: ICD-10-CM

## 2021-06-04 RX ORDER — NORETHINDRONE ACETATE AND ETHINYL ESTRADIOL .02; 1 MG/1; MG/1
1 TABLET ORAL DAILY
Qty: 90 TABLET | Refills: 3 | Status: SHIPPED | OUTPATIENT
Start: 2021-06-04 | End: 2021-09-02

## 2021-06-04 NOTE — PATIENT COMMUNICATION
Received request via: Patient    Was the patient seen in the last year in this department? Yes  5/17/2021  Does the patient have an active prescription (recently filled or refills available) for medication(s) requested? No

## 2021-06-04 NOTE — TELEPHONE ENCOUNTER
----- Message from St Kelsea Rey sent at 6/4/2021 11:03 AM PDT -----  Regarding: Prescription Question  Contact: [phone]  Hi there! I'm having crazy issues with [facility], so I requested my Rx for the birth control to be sent through [facility]. Just giving you the heads up! If you don't get the request, please let me know as soon as you can. Also- I haven't picked up the script from [facility] so it is not a double order.   Many thanks! :^)

## 2021-07-07 ENCOUNTER — APPOINTMENT (OUTPATIENT)
Dept: CARDIOLOGY | Facility: MEDICAL CENTER | Age: 26
End: 2021-07-07
Attending: FAMILY MEDICINE
Payer: COMMERCIAL

## 2021-07-19 ENCOUNTER — OFFICE VISIT (OUTPATIENT)
Dept: MEDICAL GROUP | Facility: LAB | Age: 26
End: 2021-07-19
Payer: COMMERCIAL

## 2021-07-19 VITALS
OXYGEN SATURATION: 100 % | HEART RATE: 98 BPM | BODY MASS INDEX: 16.66 KG/M2 | DIASTOLIC BLOOD PRESSURE: 68 MMHG | TEMPERATURE: 98.1 F | SYSTOLIC BLOOD PRESSURE: 102 MMHG | WEIGHT: 119 LBS | RESPIRATION RATE: 14 BRPM | HEIGHT: 71 IN

## 2021-07-19 DIAGNOSIS — R42 DIZZINESS: ICD-10-CM

## 2021-07-19 DIAGNOSIS — F33.2 SEVERE EPISODE OF RECURRENT MAJOR DEPRESSIVE DISORDER, WITHOUT PSYCHOTIC FEATURES (HCC): ICD-10-CM

## 2021-07-19 PROCEDURE — 99214 OFFICE O/P EST MOD 30 MIN: CPT | Performed by: FAMILY MEDICINE

## 2021-07-19 RX ORDER — VENLAFAXINE HYDROCHLORIDE 37.5 MG/1
37.5 CAPSULE, EXTENDED RELEASE ORAL DAILY
Qty: 30 CAPSULE | Refills: 3 | Status: SHIPPED | OUTPATIENT
Start: 2021-07-19 | End: 2021-08-27 | Stop reason: SDUPTHER

## 2021-07-19 ASSESSMENT — ENCOUNTER SYMPTOMS
ABDOMINAL PAIN: 0
DEPRESSION: 1
CHILLS: 0
INSOMNIA: 1
NERVOUS/ANXIOUS: 1
BLURRED VISION: 0
DIARRHEA: 0
PALPITATIONS: 0
FEVER: 0
VOMITING: 0
NAUSEA: 0

## 2021-07-19 ASSESSMENT — FIBROSIS 4 INDEX: FIB4 SCORE: 0.58

## 2021-07-19 ASSESSMENT — PATIENT HEALTH QUESTIONNAIRE - PHQ9
SUM OF ALL RESPONSES TO PHQ QUESTIONS 1-9: 14
CLINICAL INTERPRETATION OF PHQ2 SCORE: 5
5. POOR APPETITE OR OVEREATING: 2 - MORE THAN HALF THE DAYS

## 2021-07-19 NOTE — PROGRESS NOTES
"Subjective:   St Kelsea Rey is a 25 y.o. female here today for   Chief Complaint   Patient presents with   • Dizziness   • Anxiety       #Dizziness:  -Patient continues to have episodes of lightheadedness, dizziness.  She states that they have been continuing since last visit.  She has been pay more attention to symptoms.  States that they are episodic, symptoms do last anywhere from minutes to hours.  The longest 1 has lasted almost entire day.  She states that she has begun to have some anxiety about going out and doing things she normally enjoys with fears of having these episodes in public.  She also states that she has been getting more depressed regarding these episodes they are significantly affecting her life.  -At previous appointment we reviewed labs, tilt table test was ordered.  Patient has follow-up for tilt table in the next month.    #Depression/anxiety:  -Patient is a longstanding history of depression anxiety and has been treated pharmacologically previously.  She states that she is attempted treatment with both Wellbutrin, Zoloft, Prozac all of which she states had helped her with her depression but made her feel \"nothing\" and caused significant injuries to her mood the point where she discontinued treatment.  Last time she was on any treatment was in January 2020.  Patient does states that she feels sometimes that her anxiety does induce panic attacks which do proceed her episodes of dizziness.  She states that this does happen regularly but not every time she has an episode.  -Patient denies any suicidal/homicidal ideations, here today to discuss possible treatment measures.      Allergies   Allergen Reactions   • Latex Hives, Rash and Itching     Rash at contact site         Current medicines (including changes today)  Current Outpatient Medications   Medication Sig Dispense Refill   • norethindrone-ethinyl estradiol (LOESTRIN 1/20, 21,) 1-20 MG-MCG per tablet Take 1 tablet by mouth " "every day for 90 days. 90 tablet 3   • fluticasone (FLONASE) 50 MCG/ACT nasal spray Spray 1 Spray in nose 1 time daily as needed. 16 g 11   • albuterol (PROAIR HFA) 108 (90 Base) MCG/ACT Aero Soln inhalation aerosol Inhale 2 Puffs by mouth every 6 hours as needed for Shortness of Breath. 8.5 g 0     No current facility-administered medications for this visit.     She  has a past medical history of ADHD (attention deficit hyperactivity disorder), Allergy, Anemia, Asthma, Depression, Fear of needles, Gynecological disorder, H/O dizziness, History of fainting, Infectious disease, Low blood phosphate, and QT prolongation (1/2009).    ROS   Review of Systems   Constitutional: Negative for chills and fever.   Eyes: Negative for blurred vision.   Cardiovascular: Negative for chest pain and palpitations.   Gastrointestinal: Negative for abdominal pain, diarrhea, nausea and vomiting.   Psychiatric/Behavioral: Positive for depression. The patient is nervous/anxious and has insomnia.         Objective:     Physical Exam:  Pulse 98   Temp 36.7 °C (98.1 °F) (Temporal)   Resp 14   Ht 1.791 m (5' 10.51\")   Wt 54 kg (119 lb)   SpO2 100%  Body mass index is 16.83 kg/m².   Constitutional: Alert, no distress.  Skin: Warm, dry, good turgor, no rashes in visible areas.  Eye: Equal, round and reactive, conjunctiva clear, lids normal.  ENMT: TM's clear bilaterally, lips without lesions, good dentition, oropharynx clear.  Neck: Trachea midline, no masses, no thyromegaly. No cervical or supraclavicular lymphadenopathy.  Respiratory: Unlabored respiratory effort, lungs clear to auscultation, no wheezes, no rhonchi.  Cardiovascular: Normal S1, S2, no murmur, no edema.  Abdomen: Soft, non-tender, no masses, no hepatosplenomegaly.  Psych: Alert and oriented x3, normal affect and mood.    Depression Screening    Little interest or pleasure in doing things?  3 - nearly every day   Feeling down, depressed , or hopeless? 2 - more than half the " days   Trouble falling or staying asleep, or sleeping too much?  2 - more than half the days   Feeling tired or having little energy?  2 - more than half the days   Poor appetite or overeating?  2 - more than half the days   Feeling bad about yourself - or that you are a failure or have let yourself or your family down? 1 - several days   Trouble concentrating on things, such as reading the newspaper or watching television? 2 - more than half the days   Moving or speaking so slowly that other people could have noticed.  Or the opposite - being so fidgety or restless that you have been moving around a lot more than usual?  0 - not at all   Thoughts that you would be better off dead, or of hurting yourself?  0 - not at all   Patient Health Questionnaire Score: 14       Assessment and Plan:     1. Severe episode of recurrent major depressive disorder, without psychotic features (HCC)  -Status: Unstable.  Medication changes needed at this time.  Patient is elevated PHQ-9 score 14.  Given patient's history of both depression, anxiety, as well as remote history of ADHD I do believe the patient would do well with Effexor.  We will begin treatment at this time.  Discussed possible side effects of medication.  -Hopefully this will also help relieve some of the symptoms she is experiencing as far as acute dizziness and syncopal episodes.  Patient denies any suicidal/homicidal ideation, patient will follow-up for medication check in 1 month.  - venlafaxine XR (EFFEXOR XR) 37.5 MG CAPSULE SR 24 HR; Take 1 capsule by mouth every day.  Dispense: 30 capsule; Refill: 3  - Patient has been identified as having a positive depression screening. Appropriate orders and counseling have been given.    2. Dizziness  -Still uncertain etiology although there is some concerns regarding orthostatic hypotension given patient's blood pressure and symptoms which she experiences.  There is also concerned that depression anxiety could be an  eliciting factor which will help treat at this time) see above).  Patient will follow up in complete tilt table test and follow-up to review results in 1 month.      Followup: Return in about 4 weeks (around 8/16/2021).         PLEASE NOTE: This dictation was created using voice recognition software. I have made every reasonable attempt to correct obvious errors, but I expect that there are errors of grammar and possibly content that I did not discover before finalizing the note.

## 2021-08-18 ENCOUNTER — HOSPITAL ENCOUNTER (OUTPATIENT)
Dept: CARDIOLOGY | Facility: MEDICAL CENTER | Age: 26
End: 2021-08-18
Attending: FAMILY MEDICINE
Payer: COMMERCIAL

## 2021-08-18 PROCEDURE — 93660 TILT TABLE EVALUATION: CPT

## 2021-08-18 NOTE — PROGRESS NOTES
"TILT TABLE:  Patient had PASSIVE TILT TABLE EXAM today.    Patient NPO for Exam, has  home.   Patient was POSITIVE for passing out.       Consent Signed. PIV initiated.   Patient Given Verbal instructions/education regarding exam.   Patient had 12 mins of passive phase, event at minute 12, patient asked to stop--\"I'm in pain, please stop\" and then eys rolled to back of head. Patient immediately responsive to verbal stimuli, patient event followed by 5 mins of recovery.   Patient Vitals: HR 60s-130s, SBP 70s-120s.    Patient had self-reported symptoms, see hard chart.   Patient stated “I feel ready to go home”.    Patient given 3 bottles of water.    Patient vitals returned to baseline.    Patient given verbal discharge instructions per protocol.   Patient PIV removed.   Patient ambulated self to [facility], escorted by RN, met by family member to drive patient home.    Patient of Dr. Scruggs, who was notified via phone message regarding EKG tracings and findings.  Dr Charles COLLINS, who was notified via phone message regarding EKG tracings and findings notified of EKG tracings location.   RN placed EKG tracings and patient documents in box to be read.       "

## 2021-08-27 ENCOUNTER — OFFICE VISIT (OUTPATIENT)
Dept: MEDICAL GROUP | Facility: LAB | Age: 26
End: 2021-08-27
Payer: COMMERCIAL

## 2021-08-27 VITALS
BODY MASS INDEX: 16.1 KG/M2 | HEART RATE: 76 BPM | DIASTOLIC BLOOD PRESSURE: 64 MMHG | WEIGHT: 115 LBS | TEMPERATURE: 98.8 F | OXYGEN SATURATION: 97 % | RESPIRATION RATE: 14 BRPM | HEIGHT: 71 IN | SYSTOLIC BLOOD PRESSURE: 98 MMHG

## 2021-08-27 DIAGNOSIS — F33.2 SEVERE EPISODE OF RECURRENT MAJOR DEPRESSIVE DISORDER, WITHOUT PSYCHOTIC FEATURES (HCC): ICD-10-CM

## 2021-08-27 DIAGNOSIS — I95.1 ORTHOSTATIC HYPOTENSION: ICD-10-CM

## 2021-08-27 PROCEDURE — 99214 OFFICE O/P EST MOD 30 MIN: CPT | Performed by: FAMILY MEDICINE

## 2021-08-27 RX ORDER — MIDODRINE HYDROCHLORIDE 2.5 MG/1
2.5 TABLET ORAL 2 TIMES DAILY
Qty: 60 TABLET | Refills: 3 | Status: SHIPPED | OUTPATIENT
Start: 2021-08-27 | End: 2021-09-27 | Stop reason: SDUPTHER

## 2021-08-27 RX ORDER — VENLAFAXINE HYDROCHLORIDE 75 MG/1
75 CAPSULE, EXTENDED RELEASE ORAL DAILY
Qty: 90 CAPSULE | Refills: 3 | Status: SHIPPED | OUTPATIENT
Start: 2021-08-27 | End: 2021-11-24 | Stop reason: SDUPTHER

## 2021-08-27 ASSESSMENT — ENCOUNTER SYMPTOMS
WHEEZING: 0
WEIGHT LOSS: 0
FEVER: 0
PALPITATIONS: 0
NAUSEA: 0
SHORTNESS OF BREATH: 0
CONSTIPATION: 0
BLURRED VISION: 0
ABDOMINAL PAIN: 0
CHILLS: 0
VOMITING: 0
DIARRHEA: 0

## 2021-08-27 ASSESSMENT — FIBROSIS 4 INDEX: FIB4 SCORE: 0.58

## 2021-08-27 NOTE — PROGRESS NOTES
"Subjective:   St Kelsea Rey is a 25 y.o. female here today for   Chief Complaint   Patient presents with   • Results     TILT TABLE        #Dizziness:  -This is been a chronic ongoing problem for patient for several months.  Gone through extensive testing including tilt table test.  Patient had tilt table test completed last week and is here today to review results.    Results of the tilt table test are as follows:  \"DESCRIPTION OF PROCEDURE:  After informed consent was signed by the patient, the patient was prepped in the usual manner.  she    was then tilted 85 degrees.  her resting EKG shows sinus rhythm.  her baseline vital signs included pulse rate of 69  beats minute and blood pressure 119/72 mmHg.  she was tilted for 17  minutes.  Immediately following head up tilt the patient developed tachycardia with a heart rate increase to 133 BPM and slight blood pressure to 123/94. Over 10 minutes the heart rate gradually returned to baseline and the blood pressure gradually fell to a ty of 77/46. The patient experienced symptoms of dizziness immediately after up-right tilt- corresponding to sinus tachycardia. Symptoms of wooziness, shortness of breath and unable to hear corresponded to the vasodepressor/hypotension response.   Rest EKG: Sinus   Tilt EKG: Sinus with non-specific inferior T wave inversions    IMPRESSION:     Abnormal tilt table test demonstrating dizzy symptoms corresponding to an initial tachycardia without hypotension and subsequent near syncope after vasodepressor response (with normal heart rate) \"    Today patient states that she is doing well although symptoms do continue.  Continues to have dizziness especially when walking long distance across campus.  She has been taking the venlafaxine for depression anxiety which she felt like is also been helping with the dizziness slightly.    Allergies   Allergen Reactions   • Latex Hives, Rash and Itching     Rash at contact site " "        Current medicines (including changes today)  Current Outpatient Medications   Medication Sig Dispense Refill   • venlafaxine XR (EFFEXOR XR) 37.5 MG CAPSULE SR 24 HR Take 1 capsule by mouth every day. 30 capsule 3   • norethindrone-ethinyl estradiol (LOESTRIN 1/20, 21,) 1-20 MG-MCG per tablet Take 1 tablet by mouth every day for 90 days. 90 tablet 3   • fluticasone (FLONASE) 50 MCG/ACT nasal spray Spray 1 Spray in nose 1 time daily as needed. 16 g 11   • albuterol (PROAIR HFA) 108 (90 Base) MCG/ACT Aero Soln inhalation aerosol Inhale 2 Puffs by mouth every 6 hours as needed for Shortness of Breath. 8.5 g 0     No current facility-administered medications for this visit.     She  has a past medical history of ADHD (attention deficit hyperactivity disorder), Allergy, Anemia, Asthma, Depression, Fear of needles, Gynecological disorder, H/O dizziness, History of fainting, Infectious disease, Low blood phosphate, and QT prolongation (1/2009).    ROS   Review of Systems   Constitutional: Negative for chills, fever, malaise/fatigue and weight loss.   HENT: Negative for hearing loss.    Eyes: Negative for blurred vision.   Respiratory: Negative for shortness of breath and wheezing.    Cardiovascular: Negative for chest pain and palpitations.   Gastrointestinal: Negative for abdominal pain, constipation, diarrhea, nausea and vomiting.      Objective:     Physical Exam:  BP (!) 98/64   Pulse 76   Temp 37.1 °C (98.8 °F) (Temporal)   Resp 14   Ht 1.791 m (5' 10.51\")   Wt 52.2 kg (115 lb)   SpO2 97%  Body mass index is 16.26 kg/m².   Constitutional: Alert, no distress.  Skin: Warm, dry, good turgor, no rashes in visible areas.  Eye: Equal, round and reactive, conjunctiva clear, lids normal.  Respiratory: Unlabored respiratory effort, lungs clear to auscultation, no wheezes, no rhonchi.  Cardiovascular: Normal S1, S2, no murmur, no edema.  Abdomen: Soft, non-tender, no masses, no hepatosplenomegaly.  Psych: Alert " and oriented x3, normal affect and mood.    Assessment and Plan:     1. Orthostatic hypotension  -Positive tilt table test with continuing symptoms to suggest a diagnosis orthostatic hypotension.  Discussed extra measures such as increased caffeine usage, increased sodium usage.  Also has improved with hydration.  Have also been treatment this time with midodrine.  Will start low-dose of 2.5 mg, titrate up if needed.  Will have patient follow-up in 1 month for medication check.  - midodrine (PROAMATINE) 2.5 MG Tab; Take 1 Tablet by mouth 2 times a day for 30 days.  Dispense: 60 Tablet; Refill: 3    2. Severe episode of recurrent major depressive disorder, without psychotic features (HCC)  -Status: Unstable, medication change needed.  Given no improvement of depressed symptoms at this time we will increase Effexor 75 mg daily.  Discussed importance of compliance medication, discussed possible side effects.  Patient will follow-up in 1 month for medication check.  - venlafaxine XR (EFFEXOR XR) 75 MG CAPSULE SR 24 HR; Take 1 Capsule by mouth every day for 90 days.  Dispense: 90 Capsule; Refill: 3      Followup: Return in about 4 weeks (around 9/24/2021).         PLEASE NOTE: This dictation was created using voice recognition software. I have made every reasonable attempt to correct obvious errors, but I expect that there are errors of grammar and possibly content that I did not discover before finalizing the note.

## 2021-09-01 RX ORDER — FLUTICASONE PROPIONATE 50 MCG
1 SPRAY, SUSPENSION (ML) NASAL
Qty: 16 G | Refills: 11 | Status: SHIPPED | OUTPATIENT
Start: 2021-09-01

## 2021-09-27 ENCOUNTER — OFFICE VISIT (OUTPATIENT)
Dept: MEDICAL GROUP | Facility: LAB | Age: 26
End: 2021-09-27
Payer: COMMERCIAL

## 2021-09-27 VITALS
SYSTOLIC BLOOD PRESSURE: 108 MMHG | OXYGEN SATURATION: 98 % | DIASTOLIC BLOOD PRESSURE: 72 MMHG | TEMPERATURE: 98.2 F | HEART RATE: 106 BPM | HEIGHT: 70 IN | BODY MASS INDEX: 17.07 KG/M2 | WEIGHT: 119.27 LBS

## 2021-09-27 DIAGNOSIS — Z23 NEED FOR VACCINATION: ICD-10-CM

## 2021-09-27 DIAGNOSIS — I95.1 ORTHOSTATIC HYPOTENSION: ICD-10-CM

## 2021-09-27 DIAGNOSIS — F90.2 ATTENTION DEFICIT HYPERACTIVITY DISORDER (ADHD), COMBINED TYPE: ICD-10-CM

## 2021-09-27 PROCEDURE — 90732 PPSV23 VACC 2 YRS+ SUBQ/IM: CPT | Performed by: FAMILY MEDICINE

## 2021-09-27 PROCEDURE — 90471 IMMUNIZATION ADMIN: CPT | Performed by: FAMILY MEDICINE

## 2021-09-27 PROCEDURE — 99214 OFFICE O/P EST MOD 30 MIN: CPT | Mod: 25 | Performed by: FAMILY MEDICINE

## 2021-09-27 PROCEDURE — 90472 IMMUNIZATION ADMIN EACH ADD: CPT | Performed by: FAMILY MEDICINE

## 2021-09-27 PROCEDURE — 90621 MENB-FHBP VACC 2/3 DOSE IM: CPT | Performed by: FAMILY MEDICINE

## 2021-09-27 RX ORDER — MIDODRINE HYDROCHLORIDE 2.5 MG/1
2.5 TABLET ORAL 2 TIMES DAILY
Qty: 60 TABLET | Refills: 3
Start: 2021-09-27 | End: 2021-10-27

## 2021-09-27 RX ORDER — DEXTROAMPHETAMINE SACCHARATE, AMPHETAMINE ASPARTATE MONOHYDRATE, DEXTROAMPHETAMINE SULFATE AND AMPHETAMINE SULFATE 2.5; 2.5; 2.5; 2.5 MG/1; MG/1; MG/1; MG/1
10 CAPSULE, EXTENDED RELEASE ORAL EVERY MORNING
Qty: 30 CAPSULE | Refills: 0 | Status: SHIPPED | OUTPATIENT
Start: 2021-09-27 | End: 2021-10-27

## 2021-09-27 ASSESSMENT — ENCOUNTER SYMPTOMS
PALPITATIONS: 0
CHILLS: 0
SHORTNESS OF BREATH: 0
CONSTIPATION: 0
ABDOMINAL PAIN: 0
DIARRHEA: 0
WHEEZING: 0
FEVER: 0
DIAPHORESIS: 0
VOMITING: 0
BLURRED VISION: 0
DIZZINESS: 0
NAUSEA: 0

## 2021-09-27 ASSESSMENT — FIBROSIS 4 INDEX: FIB4 SCORE: 0.58

## 2021-09-27 NOTE — PROGRESS NOTES
Subjective:   St Kelsea Rey is a 25 y.o. female here today for   Chief Complaint   Patient presents with   • Follow-Up   • Medication Refill       #Orthostatic hypotension:  -Previously placed patient on midodrine 2.5 mg twice daily.  She states that since beginning that she has had a mostly complete resolution of her dizzy spells.  They do occasionally occur now and then but overall doing well.  She has been able to get a job and keep a job as well as continue with school.  She denies any side effects of the medication which discontinued at this time.    #ADHD:  -Patient states that she was originally diagnosed with ADHD at the age of 16.  She states at that time her parents had noticed that ever since she was a child she had difficulty with attention, school work, organization.  She was previously being seen by previous PCPs and was being treated with Adderall 5 mg twice daily.  She states that this significantly helped with her ADHD symptoms.  She discontinued back in 2017 due to weight loss.  Patient stated that recently has noticed guilty with focusing and completing schoolwork, difficulty completing tasks at work.  Boyfriend present, also endorses these symptoms. Denies any changes to her diet.  Denies any tobacco, recreational drug use.  Here today to discuss restarting Adderall.      Allergies   Allergen Reactions   • Latex Hives, Rash and Itching     Rash at contact site         Current medicines (including changes today)  Current Outpatient Medications   Medication Sig Dispense Refill   • fluticasone (FLONASE) 50 MCG/ACT nasal spray Administer 1 Spray into affected nostril(S) 1 time a day as needed. 16 g 11   • venlafaxine XR (EFFEXOR XR) 75 MG CAPSULE SR 24 HR Take 1 Capsule by mouth every day for 90 days. 90 Capsule 3   • albuterol (PROAIR HFA) 108 (90 Base) MCG/ACT Aero Soln inhalation aerosol Inhale 2 Puffs by mouth every 6 hours as needed for Shortness of Breath. 8.5 g 0     No current  "facility-administered medications for this visit.     She  has a past medical history of ADHD (attention deficit hyperactivity disorder), Allergy, Anemia, Asthma, Depression, Fear of needles, Gynecological disorder, H/O dizziness, History of fainting, Infectious disease, Low blood phosphate, and QT prolongation (1/2009).    ROS   Review of Systems   Constitutional: Negative for chills, diaphoresis, fever and malaise/fatigue.   HENT: Negative for hearing loss.    Eyes: Negative for blurred vision.   Respiratory: Negative for shortness of breath and wheezing.    Cardiovascular: Negative for chest pain and palpitations.   Gastrointestinal: Negative for abdominal pain, constipation, diarrhea, nausea and vomiting.   Neurological: Negative for dizziness.      Objective:     Physical Exam:  /72 (BP Location: Left arm, Patient Position: Sitting, BP Cuff Size: Adult)   Pulse (!) 106   Temp 36.8 °C (98.2 °F) (Temporal)   Ht 1.778 m (5' 10\")   Wt 54.1 kg (119 lb 4.3 oz)   SpO2 98%  Body mass index is 17.11 kg/m².   Constitutional: Alert, no distress.  Skin: Warm, dry, good turgor, no rashes in visible areas.  Eye: Equal, round and reactive, conjunctiva clear, lids normal.  Respiratory: Unlabored respiratory effort  Psych: Alert and oriented x3, normal affect and mood.    Assessment and Plan:     1. Orthostatic hypotension  -Status: Stable.  Continue treatment with midodrine.  -We will be starting patient on Adderall (see below).  At this time I am concerned about significant side effects including elevated blood pressure with the use of midodrine and Adderall concomitantly.  At this time I recommend the patient discontinue the midodrine for a time while she begins the Adderall.  If the Adderall is treating both her ADHD as well as her orthostatic hypotension symptoms then we can continue with just Adderall sans midodrine.  -Patient will follow-up in 1 month for medication symptom check.  - midodrine (PROAMATINE) 2.5 " MG Tab; Take 1 Tablet by mouth 2 times a day for 30 days.  Dispense: 60 Tablet; Refill: 3    2. Attention deficit hyperactivity disorder (ADHD), combined type  -Status: Unstable.  Will need to restart patient on Adderall at this time.  In lieu of of 5 mg twice daily we will begin at this time with 10 mg XR.  -Discussed appropriate's of medication.  Maxim possible side effects including concomitant use with midodrine (see above).  -Trolled symptoms agreement filed.  -Obtained and reviewed patient utilization report from [facility] pharmacy database on 09/27/21 to writing prescription for controlled substance II, III or IV per [place] bill . Based on assessment of the report,my physical exam if necessary and the patient's health problem, the prescription is medically necessary.  - amphetamine-dextroamphetamine XR (ADDERALL XR) 10 MG CAPSULE SR 24 HR; Take 1 Capsule by mouth every morning for 30 days.  Dispense: 30 Capsule; Refill: 0  - Controlled Substance Treatment Agreement    3. Need for vaccination  -Patient is due for vaccinations including meningococcal, Trumenba which will complete at this time.  - Meningococcal (IM) Group B  - Pneumococal Polysaccharide Vaccine 23-Valent =>3YO SQ/IM      Followup: Return in about 3 months (around 12/27/2021).         PLEASE NOTE: This dictation was created using voice recognition software. I have made every reasonable attempt to correct obvious errors, but I expect that there are errors of grammar and possibly content that I did not discover before finalizing the note.

## 2021-10-27 ENCOUNTER — OFFICE VISIT (OUTPATIENT)
Dept: MEDICAL GROUP | Facility: LAB | Age: 26
End: 2021-10-27
Payer: COMMERCIAL

## 2021-10-27 ENCOUNTER — HOSPITAL ENCOUNTER (OUTPATIENT)
Facility: MEDICAL CENTER | Age: 26
End: 2021-10-27
Attending: NURSE PRACTITIONER
Payer: COMMERCIAL

## 2021-10-27 VITALS
HEIGHT: 70 IN | TEMPERATURE: 98.2 F | DIASTOLIC BLOOD PRESSURE: 62 MMHG | RESPIRATION RATE: 14 BRPM | SYSTOLIC BLOOD PRESSURE: 106 MMHG | OXYGEN SATURATION: 98 % | BODY MASS INDEX: 17.44 KG/M2 | WEIGHT: 121.8 LBS | HEART RATE: 112 BPM

## 2021-10-27 DIAGNOSIS — F90.2 ATTENTION DEFICIT HYPERACTIVITY DISORDER (ADHD), COMBINED TYPE: ICD-10-CM

## 2021-10-27 DIAGNOSIS — J02.9 PHARYNGITIS, UNSPECIFIED ETIOLOGY: ICD-10-CM

## 2021-10-27 LAB
COVID ORDER STATUS COVID19: NORMAL
INT CON NEG: NEGATIVE
INT CON POS: POSITIVE
S PYO AG THROAT QL: NEGATIVE
SARS-COV-2 RNA RESP QL NAA+PROBE: NOTDETECTED
SPECIMEN SOURCE: NORMAL

## 2021-10-27 PROCEDURE — U0003 INFECTIOUS AGENT DETECTION BY NUCLEIC ACID (DNA OR RNA); SEVERE ACUTE RESPIRATORY SYNDROME CORONAVIRUS 2 (SARS-COV-2) (CORONAVIRUS DISEASE [COVID-19]), AMPLIFIED PROBE TECHNIQUE, MAKING USE OF HIGH THROUGHPUT TECHNOLOGIES AS DESCRIBED BY CMS-2020-01-R: HCPCS

## 2021-10-27 PROCEDURE — 99213 OFFICE O/P EST LOW 20 MIN: CPT | Performed by: NURSE PRACTITIONER

## 2021-10-27 PROCEDURE — U0005 INFEC AGEN DETEC AMPLI PROBE: HCPCS

## 2021-10-27 PROCEDURE — 87880 STREP A ASSAY W/OPTIC: CPT | Performed by: NURSE PRACTITIONER

## 2021-10-27 RX ORDER — DEXTROAMPHETAMINE SACCHARATE, AMPHETAMINE ASPARTATE MONOHYDRATE, DEXTROAMPHETAMINE SULFATE AND AMPHETAMINE SULFATE 3.75; 3.75; 3.75; 3.75 MG/1; MG/1; MG/1; MG/1
15 CAPSULE, EXTENDED RELEASE ORAL EVERY MORNING
Qty: 30 CAPSULE | Refills: 0 | Status: SHIPPED | OUTPATIENT
Start: 2021-10-27 | End: 2021-11-26

## 2021-10-27 ASSESSMENT — FIBROSIS 4 INDEX: FIB4 SCORE: 0.58

## 2021-10-27 NOTE — PROGRESS NOTES
Subjective:     CC: Diagnoses of Attention deficit hyperactivity disorder (ADHD), combined type and Pharyngitis, unspecified etiology were pertinent to this visit.    HPI:   St Enamorado presents today with the following:    Attention deficit hyperactivity disorder (ADHD), combined type  This is a chronic condition. Patient was recently prescribed Adderall XR 10 mg daily with moderate relief of ADHD symptoms. She feels like it was helping initially, but in the last several weeks it is no longer helping as much. She would like to try an increased dose at this time.     There were concerns with taking adderall and midodrine due to orthostatic hypotension, but patient reports that she is still needing to take the midodrine in the morning. She has stopped taking the midrodrine in the evening.     Pharyngitis  Patient reports that she has had a sore throat for the last several days. She reports that the first day she yelled at a concert and thought that is what caused her sore throat, but then the next night she had more of a cough, congested ears, itching inner ear, runny nose, and fatigue.    Denies fever, chills, myalgias, loss of taste/smell.  + sick contacts, cold-like symptoms.   Vaccinated for covid.    Current Outpatient Medications Ordered in Epic   Medication Sig Dispense Refill   • amphetamine-dextroamphetamine (ADDERALL XR) 15 MG XR capsule Take 1 Capsule by mouth every morning for 30 days. 30 Capsule 0   • fluticasone (FLONASE) 50 MCG/ACT nasal spray Administer 1 Spray into affected nostril(S) 1 time a day as needed. 16 g 11   • venlafaxine XR (EFFEXOR XR) 75 MG CAPSULE SR 24 HR Take 1 Capsule by mouth every day for 90 days. 90 Capsule 3   • albuterol (PROAIR HFA) 108 (90 Base) MCG/ACT Aero Soln inhalation aerosol Inhale 2 Puffs by mouth every 6 hours as needed for Shortness of Breath. 8.5 g 0     No current Epic-ordered facility-administered medications on file.     ROS:   As documented in history of present  "illness above    Objective:     Exam: /62 (BP Location: Right arm, Patient Position: Sitting, BP Cuff Size: Adult)   Pulse (!) 112   Temp 36.8 °C (98.2 °F)   Resp 14   Ht 1.778 m (5' 10\")   Wt 55.2 kg (121 lb 12.8 oz)   SpO2 98%  Body mass index is 17.48 kg/m².    General: Normal appearing. No distress.  HEENT: Normocephalic. Eyes conjunctiva clear lids without ptosis, pupils equal and reactive to light accommodation, ears normal shape and contour, canals are clear bilaterally, tympanic membranes are benign, nasal mucosa benign, oropharynx is without erythema, edema or exudates.   Neck: Supple without JVD or bruit. Thyroid is not enlarged.  Pulmonary: Clear to ausculation.  Normal effort. No rales, ronchi, or wheezing.  Cardiovascular: Regular rate and rhythm without murmur. Carotid and radial pulses are intact and equal bilaterally.  Abdomen: Soft, nontender, nondistended. Normal bowel sounds. Liver and spleen are not palpable  Neurologic: Grossly nonfocal  Lymph: No cervical or supraclavicular lymph nodes are palpable  Skin: Warm and dry.  No obvious lesions.  Musculoskeletal: Normal gait. No extremity cyanosis, clubbing, or edema.  Psych: Normal mood and affect. Alert and oriented x3. Judgment and insight is normal.    Assessment & Plan:     25 y.o. female with the following -     1. Attention deficit hyperactivity disorder (ADHD), combined type  This is a chronic unstable condition. Increased dosage of Adderall XR to 15 mg daily. Patient to take medication as prescribed. Patient understands this prescription is a controlled substance which is potentially habit-forming and its use is regulated by the [place]. Refills are subject to terms of a controlled substance agreement and patient has an updated one on file.  Most recent UDS is appropriate. They are aware that any refill requires an office visit. Obtained and reviewed patient utilization report from [facility] pharmacy database on 10/27/2021 10:17 " AM  prior to writing prescription for controlled substance II, III or IV per [place] bill . Based on assessment of the report, the prescription is medically necessary.   - Discussed possible side effects with concomitant use of midodrine. Patient to continue monitoring blood pressure.   - amphetamine-dextroamphetamine (ADDERALL XR) 15 MG XR capsule; Take 1 Capsule by mouth every morning for 30 days.  Dispense: 30 Capsule; Refill: 0    2. Pharyngitis, unspecified etiology  Negative strep test in office. Covid swab sent out for testing, will contact patient with results.  Notified patient it could take anywhere from 2 to 7 days for results to come back.  Discussed worsening symptoms or new symptoms and ER follow-up if needed.  Patient to follow employer (if employed), county, local, state, national guidelines for protection.  Patient to contact the county for recommendations and written notes about clearing from Covid and returning to work. Patient can take over-the-counter Tylenol for pain, fever, discomfort, and other over-the-counter medications for symptom relief.  All questions answered.  - SARS-CoV-2 PCR (24 hour In-House): Collect NP swab in VTM; Future  - POCT Rapid Strep A    Return in about 4 weeks (around 11/24/2021).    Please note that this dictation was created using voice recognition software. I have made every reasonable attempt to correct obvious errors, but I expect that there are errors of grammar and possibly content that I did not discover before finalizing the note.

## 2021-11-02 NOTE — ASSESSMENT & PLAN NOTE
This is a chronic condition. Patient was recently prescribed Adderall XR 10 mg daily with moderate relief of ADHD symptoms. She feels like it was helping initially, but in the last several weeks it is no longer helping as much. She would like to try an increased dose at this time.     There were concerns with taking adderall and midodrine due to orthostatic hypotension, but patient reports that she is still needing to take the midodrine in the morning. She has stopped taking the midrodrine in the evening.

## 2021-11-23 ENCOUNTER — PATIENT MESSAGE (OUTPATIENT)
Dept: MEDICAL GROUP | Facility: LAB | Age: 26
End: 2021-11-23

## 2021-11-23 DIAGNOSIS — F33.2 SEVERE EPISODE OF RECURRENT MAJOR DEPRESSIVE DISORDER, WITHOUT PSYCHOTIC FEATURES (HCC): ICD-10-CM

## 2021-11-24 RX ORDER — VENLAFAXINE HYDROCHLORIDE 150 MG/1
150 CAPSULE, EXTENDED RELEASE ORAL DAILY
Qty: 90 CAPSULE | Refills: 3 | Status: SHIPPED | OUTPATIENT
Start: 2021-11-24 | End: 2021-12-01 | Stop reason: SDUPTHER

## 2021-11-24 NOTE — TELEPHONE ENCOUNTER
Please let patient know that I have called in her medication. I have increased the Effexor up to 150mg. Please make sure she followed up with me in the beginning of Dec. Thanks.

## 2021-11-30 DIAGNOSIS — F90.2 ATTENTION DEFICIT HYPERACTIVITY DISORDER (ADHD), COMBINED TYPE: ICD-10-CM

## 2021-11-30 DIAGNOSIS — Z30.41 ENCOUNTER FOR SURVEILLANCE OF CONTRACEPTIVE PILLS: ICD-10-CM

## 2021-11-30 RX ORDER — NORETHINDRONE ACETATE AND ETHINYL ESTRADIOL, ETHINYL ESTRADIOL AND FERROUS FUMARATE 1MG-10(24)
1 KIT ORAL DAILY
Qty: 84 TABLET | Refills: 6 | Status: SHIPPED | OUTPATIENT
Start: 2021-11-30 | End: 2021-12-02 | Stop reason: SDUPTHER

## 2021-11-30 NOTE — TELEPHONE ENCOUNTER
----- Message from St Kelsea Rey sent at 11/27/2021  1:03 AM PST -----  Regarding: Effexor Question  Sounds good! I will be there :^)   Additionally, I would very much appreciate it if Dr. Mcdonald might be able to review my birth control pills for a refill. I have pills for 11/27-12/1 so I would super appreciate a refill sent to the [facility] pharmacy, if possible. I appreciate you guys very much!  Best,  Kelsea LUCAS

## 2021-12-01 DIAGNOSIS — F33.2 SEVERE EPISODE OF RECURRENT MAJOR DEPRESSIVE DISORDER, WITHOUT PSYCHOTIC FEATURES (HCC): ICD-10-CM

## 2021-12-01 RX ORDER — VENLAFAXINE HYDROCHLORIDE 150 MG/1
150 CAPSULE, EXTENDED RELEASE ORAL DAILY
Qty: 90 CAPSULE | Refills: 3 | Status: SHIPPED | OUTPATIENT
Start: 2021-12-01 | End: 2021-12-06

## 2021-12-01 NOTE — TELEPHONE ENCOUNTER
----- Message from St Kelsea Rey sent at 12/1/2021 11:19 AM PST -----  Regarding: Effexor Question  Thank you for sending that refill in! It ended up being $140 however— is there a way I could possibly just get a 1 month supply so I could order the rest through [facility] where it is cheaper?     Thanks!!

## 2021-12-02 ENCOUNTER — PATIENT MESSAGE (OUTPATIENT)
Dept: MEDICAL GROUP | Facility: LAB | Age: 26
End: 2021-12-02

## 2021-12-02 DIAGNOSIS — Z30.41 ENCOUNTER FOR SURVEILLANCE OF CONTRACEPTIVE PILLS: ICD-10-CM

## 2021-12-02 RX ORDER — NORETHINDRONE ACETATE AND ETHINYL ESTRADIOL, ETHINYL ESTRADIOL AND FERROUS FUMARATE 1MG-10(24)
1 KIT ORAL DAILY
Qty: 28 TABLET | Refills: 11 | Status: SHIPPED | OUTPATIENT
Start: 2021-12-02 | End: 2021-12-06

## 2021-12-02 NOTE — TELEPHONE ENCOUNTER
----- Message from St Kelsea Rey sent at 12/2/2021  9:48 AM PST -----  Regarding: Birth control question  Hi! The 3mo supply of the birth control is going to be $140. Is there a way you guys could send in just one month so I could get the rest through [facility]?     Thanks!

## 2021-12-06 ENCOUNTER — OFFICE VISIT (OUTPATIENT)
Dept: MEDICAL GROUP | Facility: LAB | Age: 26
End: 2021-12-06
Payer: COMMERCIAL

## 2021-12-06 VITALS
OXYGEN SATURATION: 98 % | WEIGHT: 122 LBS | HEART RATE: 100 BPM | TEMPERATURE: 98.1 F | HEIGHT: 70 IN | RESPIRATION RATE: 14 BRPM | BODY MASS INDEX: 17.47 KG/M2 | DIASTOLIC BLOOD PRESSURE: 64 MMHG | SYSTOLIC BLOOD PRESSURE: 90 MMHG

## 2021-12-06 DIAGNOSIS — F33.2 SEVERE EPISODE OF RECURRENT MAJOR DEPRESSIVE DISORDER, WITHOUT PSYCHOTIC FEATURES (HCC): ICD-10-CM

## 2021-12-06 DIAGNOSIS — Z23 NEED FOR VACCINATION: ICD-10-CM

## 2021-12-06 DIAGNOSIS — F90.2 ATTENTION DEFICIT HYPERACTIVITY DISORDER (ADHD), COMBINED TYPE: ICD-10-CM

## 2021-12-06 PROCEDURE — 90471 IMMUNIZATION ADMIN: CPT | Performed by: FAMILY MEDICINE

## 2021-12-06 PROCEDURE — 99214 OFFICE O/P EST MOD 30 MIN: CPT | Mod: 25 | Performed by: FAMILY MEDICINE

## 2021-12-06 PROCEDURE — 90686 IIV4 VACC NO PRSV 0.5 ML IM: CPT | Performed by: FAMILY MEDICINE

## 2021-12-06 RX ORDER — MIDODRINE HYDROCHLORIDE 2.5 MG/1
TABLET ORAL
COMMUNITY
Start: 2021-12-02 | End: 2022-01-31

## 2021-12-06 RX ORDER — DEXTROAMPHETAMINE SACCHARATE, AMPHETAMINE ASPARTATE, DEXTROAMPHETAMINE SULFATE AND AMPHETAMINE SULFATE 3.75; 3.75; 3.75; 3.75 MG/1; MG/1; MG/1; MG/1
15 TABLET ORAL 2 TIMES DAILY
Qty: 60 TABLET | Refills: 0 | Status: SHIPPED | OUTPATIENT
Start: 2021-12-06 | End: 2022-02-22 | Stop reason: SDUPTHER

## 2021-12-06 RX ORDER — NORETHINDRONE ACETATE AND ETHINYL ESTRADIOL .02; 1 MG/1; MG/1
TABLET ORAL
COMMUNITY
Start: 2021-12-02 | End: 2022-06-27

## 2021-12-06 RX ORDER — VENLAFAXINE HYDROCHLORIDE 75 MG/1
75 CAPSULE, EXTENDED RELEASE ORAL DAILY
Qty: 30 CAPSULE | Refills: 3 | Status: SHIPPED | OUTPATIENT
Start: 2021-12-06 | End: 2022-02-02 | Stop reason: SDUPTHER

## 2021-12-06 RX ORDER — DEXTROAMPHETAMINE SACCHARATE, AMPHETAMINE ASPARTATE, DEXTROAMPHETAMINE SULFATE AND AMPHETAMINE SULFATE 3.75; 3.75; 3.75; 3.75 MG/1; MG/1; MG/1; MG/1
15 TABLET ORAL 2 TIMES DAILY
Qty: 60 TABLET | Refills: 0 | Status: SHIPPED | OUTPATIENT
Start: 2022-02-04 | End: 2022-01-27

## 2021-12-06 RX ORDER — DEXTROAMPHETAMINE SACCHARATE, AMPHETAMINE ASPARTATE, DEXTROAMPHETAMINE SULFATE AND AMPHETAMINE SULFATE 3.75; 3.75; 3.75; 3.75 MG/1; MG/1; MG/1; MG/1
15 TABLET ORAL 2 TIMES DAILY
Qty: 60 TABLET | Refills: 0 | Status: SHIPPED | OUTPATIENT
Start: 2022-01-05 | End: 2022-02-04

## 2021-12-06 ASSESSMENT — ENCOUNTER SYMPTOMS
DEPRESSION: 0
NERVOUS/ANXIOUS: 0
PALPITATIONS: 0
SHORTNESS OF BREATH: 0
NAUSEA: 0
FEVER: 0
DIARRHEA: 0
CHILLS: 0
BLURRED VISION: 0
VOMITING: 0
WHEEZING: 0
ABDOMINAL PAIN: 0

## 2021-12-06 ASSESSMENT — FIBROSIS 4 INDEX: FIB4 SCORE: 0.6

## 2021-12-06 NOTE — PROGRESS NOTES
"Subjective:   St Kelsea Rey is a 26 y.o. female here today for   Chief Complaint   Patient presents with   • Medication Refill     #ADHD:  -Patient has longstanding history of ADHD currently treated Adderall 15 mg daily.  She states she is doing pretty well in the morning; however, in the afternoon she has continued problems with concentration, finishing her work, focusing.  She is compliant medication, is taking drug holidays, denies any drug diversion.  -Patient is interested in increasing the medication as she feels like her symptoms could be \"improved\".    #Depression/anxiety:  -Patient is increased her venlafaxine to 150 mg daily from 75 mg daily.  Patient states that even with the increased dose that she is still having symptoms of anxiety.  She states she has the same \"chest tightness\" that she has had previously.  She is having difficulty controlling her anxiety in both place as well as driving.  She is here today to discuss further evaluation and treatment.    Allergies   Allergen Reactions   • Latex Hives, Rash and Itching     Rash at contact site         Current medicines (including changes today)  Current Outpatient Medications   Medication Sig Dispense Refill   • amphetamine-dextroamphetamine (ADDERALL) 15 MG tablet Take 1 Tablet by mouth 2 times a day for 30 days. 60 Tablet 0   • [START ON 1/5/2022] amphetamine-dextroamphetamine (ADDERALL) 15 MG tablet Take 1 Tablet by mouth 2 times a day for 30 days. 60 Tablet 0   • [START ON 2/4/2022] amphetamine-dextroamphetamine (ADDERALL) 15 MG tablet Take 1 Tablet by mouth 2 times a day for 30 days. 60 Tablet 0   • venlafaxine XR (EFFEXOR XR) 75 MG CAPSULE SR 24 HR Take 1 Capsule by mouth every day. 30 Capsule 3   • norethindrone-ethinyl estradiol (MICROGESTIN 1/20) 1-20 MG-MCG per tablet      • midodrine (PROAMATINE) 2.5 MG Tab      • fluticasone (FLONASE) 50 MCG/ACT nasal spray Administer 1 Spray into affected nostril(S) 1 time a day as needed. 16 " "g 11   • albuterol (PROAIR HFA) 108 (90 Base) MCG/ACT Aero Soln inhalation aerosol Inhale 2 Puffs by mouth every 6 hours as needed for Shortness of Breath. 8.5 g 0     No current facility-administered medications for this visit.     She  has a past medical history of ADHD (attention deficit hyperactivity disorder), Allergy, Anemia, Asthma, Depression, Fear of needles, Gynecological disorder, H/O dizziness, History of fainting, Infectious disease, Low blood phosphate, and QT prolongation (1/2009).    ROS   Review of Systems   Constitutional: Negative for chills and fever.   HENT: Negative for hearing loss.    Eyes: Negative for blurred vision.   Respiratory: Negative for shortness of breath and wheezing.    Cardiovascular: Negative for chest pain and palpitations.   Gastrointestinal: Negative for abdominal pain, diarrhea, nausea and vomiting.   Psychiatric/Behavioral: Negative for depression. The patient is not nervous/anxious.       Objective:     Physical Exam:  BP (!) 90/64   Pulse 100   Temp 36.7 °C (98.1 °F) (Temporal)   Resp 14   Ht 1.778 m (5' 10\")   Wt 55.3 kg (122 lb)   SpO2 98%  Body mass index is 17.51 kg/m².   Constitutional: Alert, no distress.  Skin: Warm, dry, good turgor, no rashes in visible areas.  Eye: Equal, round and reactive, conjunctiva clear, lids normal.  ENMT: TM's clear bilaterally, lips without lesions, good dentition, oropharynx clear.  Neck: Trachea midline, no masses, no thyromegaly. No cervical or supraclavicular lymphadenopathy.  Respiratory: Unlabored respiratory effort, lungs clear to auscultation, no wheezes, no rhonchi.  Cardiovascular: Normal S1, S2, no murmur, no edema.  Abdomen: Soft, non-tender, no masses, no hepatosplenomegaly.  Psych: Alert and oriented x3, normal affect and mood.    Assessment and Plan:     1. Attention deficit hyperactivity disorder (ADHD), combined type  -Status: Unstable.  Medication change needed.  -At this time we will increase patient's dosage " to 15 mg twice a day given she is having significant symptoms of ADHD in the afternoon.  -Discussed the possibility of increased side effects with increased dosage.  Reviewed controlled substance agreement.  -Obtained and reviewed patient utilization report from [facility] pharmacy database on 12/06/21 to writing prescription for controlled substance II, III or IV per [place] bill . Based on assessment of the report,my physical exam if necessary and the patient's health problem, the prescription is medically necessary.  - amphetamine-dextroamphetamine (ADDERALL) 15 MG tablet; Take 1 Tablet by mouth 2 times a day for 30 days.  Dispense: 60 Tablet; Refill: 0  - amphetamine-dextroamphetamine (ADDERALL) 15 MG tablet; Take 1 Tablet by mouth 2 times a day for 30 days.  Dispense: 60 Tablet; Refill: 0  - amphetamine-dextroamphetamine (ADDERALL) 15 MG tablet; Take 1 Tablet by mouth 2 times a day for 30 days.  Dispense: 60 Tablet; Refill: 0    2. Severe episode of recurrent major depressive disorder, without psychotic features (HCC)  -Status: Stable, medication change needed.  Since were not seen any improvement with the higher dose of Effexor we will decrease back down to 75 mg daily.  At this time would like to change 1 medication at a time, we will see how her symptoms improve or change with the Adderall increase.  She will follow-up with me in 2 to 3 months.  - venlafaxine XR (EFFEXOR XR) 75 MG CAPSULE SR 24 HR; Take 1 Capsule by mouth every day.  Dispense: 30 Capsule; Refill: 3    3. Need for vaccination  -Patient was agreeable to receiving the influenza vaccine in clinic today after discussion of potential risks, benefits, and side effects. Vaccine was administered without adverse effects.  - INFLUENZA VACCINE QUAD INJ (PF)      Followup: Return in about 3 months (around 3/6/2022).         PLEASE NOTE: This dictation was created using voice recognition software. I have made every reasonable attempt to correct  obvious errors, but I expect that there are errors of grammar and possibly content that I did not discover before finalizing the note.

## 2022-01-27 ENCOUNTER — HOSPITAL ENCOUNTER (OUTPATIENT)
Dept: LAB | Facility: MEDICAL CENTER | Age: 27
End: 2022-01-27
Attending: FAMILY MEDICINE
Payer: COMMERCIAL

## 2022-01-27 ENCOUNTER — OFFICE VISIT (OUTPATIENT)
Dept: MEDICAL GROUP | Facility: LAB | Age: 27
End: 2022-01-27
Payer: COMMERCIAL

## 2022-01-27 VITALS
WEIGHT: 123.02 LBS | DIASTOLIC BLOOD PRESSURE: 72 MMHG | HEART RATE: 95 BPM | SYSTOLIC BLOOD PRESSURE: 98 MMHG | TEMPERATURE: 98.5 F | HEIGHT: 70 IN | RESPIRATION RATE: 16 BRPM | BODY MASS INDEX: 17.61 KG/M2 | OXYGEN SATURATION: 95 %

## 2022-01-27 DIAGNOSIS — F33.2 SEVERE EPISODE OF RECURRENT MAJOR DEPRESSIVE DISORDER, WITHOUT PSYCHOTIC FEATURES (HCC): ICD-10-CM

## 2022-01-27 DIAGNOSIS — F90.2 ATTENTION DEFICIT HYPERACTIVITY DISORDER (ADHD), COMBINED TYPE: ICD-10-CM

## 2022-01-27 LAB
ALBUMIN SERPL BCP-MCNC: 4.5 G/DL (ref 3.2–4.9)
ALBUMIN/GLOB SERPL: 1.7 G/DL
ALP SERPL-CCNC: 47 U/L (ref 30–99)
ALT SERPL-CCNC: 12 U/L (ref 2–50)
ANION GAP SERPL CALC-SCNC: 12 MMOL/L (ref 7–16)
AST SERPL-CCNC: 16 U/L (ref 12–45)
BILIRUB SERPL-MCNC: 0.4 MG/DL (ref 0.1–1.5)
BUN SERPL-MCNC: 8 MG/DL (ref 8–22)
CALCIUM SERPL-MCNC: 9.3 MG/DL (ref 8.5–10.5)
CHLORIDE SERPL-SCNC: 104 MMOL/L (ref 96–112)
CO2 SERPL-SCNC: 22 MMOL/L (ref 20–33)
CREAT SERPL-MCNC: 0.58 MG/DL (ref 0.5–1.4)
ERYTHROCYTE [DISTWIDTH] IN BLOOD BY AUTOMATED COUNT: 41.1 FL (ref 35.9–50)
FASTING STATUS PATIENT QL REPORTED: NORMAL
GLOBULIN SER CALC-MCNC: 2.7 G/DL (ref 1.9–3.5)
GLUCOSE SERPL-MCNC: 89 MG/DL (ref 65–99)
HCT VFR BLD AUTO: 40.2 % (ref 37–47)
HGB BLD-MCNC: 13.6 G/DL (ref 12–16)
MCH RBC QN AUTO: 30.7 PG (ref 27–33)
MCHC RBC AUTO-ENTMCNC: 33.8 G/DL (ref 33.6–35)
MCV RBC AUTO: 90.7 FL (ref 81.4–97.8)
PLATELET # BLD AUTO: 239 K/UL (ref 164–446)
PMV BLD AUTO: 11.1 FL (ref 9–12.9)
POTASSIUM SERPL-SCNC: 3.7 MMOL/L (ref 3.6–5.5)
PROT SERPL-MCNC: 7.2 G/DL (ref 6–8.2)
RBC # BLD AUTO: 4.43 M/UL (ref 4.2–5.4)
SODIUM SERPL-SCNC: 138 MMOL/L (ref 135–145)
WBC # BLD AUTO: 5.7 K/UL (ref 4.8–10.8)

## 2022-01-27 PROCEDURE — 80053 COMPREHEN METABOLIC PANEL: CPT

## 2022-01-27 PROCEDURE — 85027 COMPLETE CBC AUTOMATED: CPT

## 2022-01-27 PROCEDURE — 36415 COLL VENOUS BLD VENIPUNCTURE: CPT

## 2022-01-27 PROCEDURE — 99214 OFFICE O/P EST MOD 30 MIN: CPT | Performed by: FAMILY MEDICINE

## 2022-01-27 RX ORDER — ARIPIPRAZOLE 2 MG/1
2 TABLET ORAL DAILY
Qty: 30 TABLET | Refills: 3 | Status: SHIPPED | OUTPATIENT
Start: 2022-01-27 | End: 2022-02-23 | Stop reason: SDUPTHER

## 2022-01-27 ASSESSMENT — LIFESTYLE VARIABLES: SUBSTANCE_ABUSE: 0

## 2022-01-27 ASSESSMENT — PATIENT HEALTH QUESTIONNAIRE - PHQ9
5. POOR APPETITE OR OVEREATING: 1 - SEVERAL DAYS
SUM OF ALL RESPONSES TO PHQ QUESTIONS 1-9: 14
CLINICAL INTERPRETATION OF PHQ2 SCORE: 5

## 2022-01-27 ASSESSMENT — ENCOUNTER SYMPTOMS
CHILLS: 0
DIAPHORESIS: 1
DEPRESSION: 1
WEIGHT LOSS: 0
FEVER: 0

## 2022-01-27 ASSESSMENT — FIBROSIS 4 INDEX: FIB4 SCORE: 0.6

## 2022-01-27 NOTE — PROGRESS NOTES
Subjective:   St Kelsea Rey is a 26 y.o. female here today for   Chief Complaint   Patient presents with   • Medication Refill   • Medication Management     #ADHD:  -Currently treating with Adderall 15 mg twice daily.  Compliant medication, states she is doing well.  Continues to have some side effects including decreased appetite but is working on getting calories and where she can.  No significant weight changes at this time.  Wish to continue the medication.    #Depression   -This been a chronic longstanding condition of the patient which has been treatment resistant for some time.  We have tried several of medications currently on Effexor 120 mg.  He states that while it is somewhat helping with depression is causing significant side effects including sedation especially in the evening.  She does have occasional passive death wish although no formal suicide plans.  She states that the side effects are to the point where she would like to consider switching medication.  She has been on several different SSRIs, atypicals with no real success.  Patient is interested in [place] at this time as she has never attempted treatment with this medication.      Allergies   Allergen Reactions   • Latex Hives, Rash and Itching     Rash at contact site         Current medicines (including changes today)  Current Outpatient Medications   Medication Sig Dispense Refill   • norethindrone-ethinyl estradiol (MICROGESTIN 1/20) 1-20 MG-MCG per tablet      • midodrine (PROAMATINE) 2.5 MG Tab      • venlafaxine XR (EFFEXOR XR) 75 MG CAPSULE SR 24 HR Take 1 Capsule by mouth every day. 30 Capsule 3   • fluticasone (FLONASE) 50 MCG/ACT nasal spray Administer 1 Spray into affected nostril(S) 1 time a day as needed. 16 g 11   • albuterol (PROAIR HFA) 108 (90 Base) MCG/ACT Aero Soln inhalation aerosol Inhale 2 Puffs by mouth every 6 hours as needed for Shortness of Breath. 8.5 g 0   • amphetamine-dextroamphetamine (ADDERALL) 15  "MG tablet Take 1 Tablet by mouth 2 times a day for 30 days. 60 Tablet 0   • [START ON 2/4/2022] amphetamine-dextroamphetamine (ADDERALL) 15 MG tablet Take 1 Tablet by mouth 2 times a day for 30 days. (Patient not taking: Reported on 1/27/2022) 60 Tablet 0     No current facility-administered medications for this visit.     She  has a past medical history of ADHD (attention deficit hyperactivity disorder), Allergy, Anemia, Asthma, Depression, Fear of needles, Gynecological disorder, H/O dizziness, History of fainting, Infectious disease, Low blood phosphate, and QT prolongation (1/2009).    ROS   Review of Systems   Constitutional: Positive for diaphoresis and malaise/fatigue. Negative for chills, fever and weight loss.   Psychiatric/Behavioral: Positive for depression. Negative for substance abuse and suicidal ideas.          Objective:     Physical Exam:  BP (!) 98/72 (BP Location: Left arm, Patient Position: Sitting, BP Cuff Size: Adult)   Pulse 95   Temp 36.9 °C (98.5 °F) (Temporal)   Resp 16   Ht 1.778 m (5' 10\")   Wt 55.8 kg (123 lb 0.3 oz)   SpO2 95%  Body mass index is 17.65 kg/m².   Constitutional: Alert, no distress.  Skin: Warm, dry, good turgor, no rashes in visible areas.  Eye: Equal, round and reactive, conjunctiva clear, lids normal.  Respiratory: Unlabored respiratory effort.  Psych: Alert and oriented x3, normal affect and mood.    Depression Screening    Little interest or pleasure in doing things?  3 - nearly every day   Feeling down, depressed , or hopeless? 2 - more than half the days   Trouble falling or staying asleep, or sleeping too much?  1 - several days   Feeling tired or having little energy?  3 - nearly every day   Poor appetite or overeating?  1 - several days   Feeling bad about yourself - or that you are a failure or have let yourself or your family down? 2 - more than half the days   Trouble concentrating on things, such as reading the newspaper or watching television? 2 - more " than half the days   Moving or speaking so slowly that other people could have noticed.  Or the opposite - being so fidgety or restless that you have been moving around a lot more than usual?  0 - not at all   Thoughts that you would be better off dead, or of hurting yourself?  0 - not at all   Patient Health Questionnaire Score: 14       Assessment and Plan:     1. Attention deficit hyperactivity disorder (ADHD), combined type  -Stable.  We will continue with Adderall 50 mg twice daily.  Continue working on calorie intake for weight stabilization.    2. Severe episode of recurrent major depressive disorder, without psychotic features (HCC)  -Status: Stable, medication change needed.  We will cross taper off Effexor and be given Abilify.  We will start very low dosage given patient may be more symptom prone.  We will check CBC, CMP before medication is started.  -No suicidal ideations at this time, suicide prevention plan is in place.  - ARIPiprazole (ABILIFY) 2 MG tablet; Take 1 Tablet by mouth every day for 30 days.  Dispense: 30 Tablet; Refill: 3  - CBC WITHOUT DIFFERENTIAL; Future  - Comp Metabolic Panel; Future      Followup: Return in about 4 weeks (around 2/24/2022).         PLEASE NOTE: This dictation was created using voice recognition software. I have made every reasonable attempt to correct obvious errors, but I expect that there are errors of grammar and possibly content that I did not discover before finalizing the note.

## 2022-01-31 RX ORDER — MIDODRINE HYDROCHLORIDE 2.5 MG/1
TABLET ORAL
Qty: 60 TABLET | Refills: 3 | Status: SHIPPED | OUTPATIENT
Start: 2022-01-31 | End: 2022-03-23 | Stop reason: SDUPTHER

## 2022-02-02 DIAGNOSIS — F33.2 SEVERE EPISODE OF RECURRENT MAJOR DEPRESSIVE DISORDER, WITHOUT PSYCHOTIC FEATURES (HCC): ICD-10-CM

## 2022-02-02 RX ORDER — VENLAFAXINE HYDROCHLORIDE 75 MG/1
75 CAPSULE, EXTENDED RELEASE ORAL DAILY
Qty: 30 CAPSULE | Refills: 3 | Status: SHIPPED | OUTPATIENT
Start: 2022-02-02 | End: 2022-05-25

## 2022-02-03 NOTE — TELEPHONE ENCOUNTER
Received request via: Patient    Was the patient seen in the last year in this department? Yes  LOV 01/27/2022  Does the patient have an active prescription (recently filled or refills available) for medication(s) requested? No

## 2022-02-22 DIAGNOSIS — F90.2 ATTENTION DEFICIT HYPERACTIVITY DISORDER (ADHD), COMBINED TYPE: ICD-10-CM

## 2022-02-22 DIAGNOSIS — F33.2 SEVERE EPISODE OF RECURRENT MAJOR DEPRESSIVE DISORDER, WITHOUT PSYCHOTIC FEATURES (HCC): ICD-10-CM

## 2022-02-22 NOTE — TELEPHONE ENCOUNTER
Received request via: Patient    Was the patient seen in the last year in this department? Yes  1/27/22  Does the patient have an active prescription (recently filled or refills available) for medication(s) requested? No

## 2022-02-22 NOTE — TELEPHONE ENCOUNTER
----- Message from St Kelsea Rey sent at 2/22/2022 10:32 AM PST -----  Regarding: Refill Request  Hello there!     Just requesting a refill of the adderall, preferably to the [facility] on [address]. I would also love to up it, or to raise my Abilify dosage- whatever you think is best. I’ve just been down, overly emotional, & very little energy these days.   Feel free to reach me on my cell at [phone].     Many thanks!

## 2022-02-23 RX ORDER — DEXTROAMPHETAMINE SACCHARATE, AMPHETAMINE ASPARTATE, DEXTROAMPHETAMINE SULFATE AND AMPHETAMINE SULFATE 3.75; 3.75; 3.75; 3.75 MG/1; MG/1; MG/1; MG/1
15 TABLET ORAL 2 TIMES DAILY
Qty: 60 TABLET | Refills: 0 | Status: SHIPPED | OUTPATIENT
Start: 2022-02-23 | End: 2022-03-22 | Stop reason: SDUPTHER

## 2022-02-23 RX ORDER — ARIPIPRAZOLE 2 MG/1
4 TABLET ORAL DAILY
Qty: 30 TABLET | Refills: 3 | Status: SHIPPED | OUTPATIENT
Start: 2022-02-23 | End: 2022-03-22 | Stop reason: SDUPTHER

## 2022-03-22 ENCOUNTER — PATIENT MESSAGE (OUTPATIENT)
Dept: MEDICAL GROUP | Facility: LAB | Age: 27
End: 2022-03-22
Payer: COMMERCIAL

## 2022-03-22 DIAGNOSIS — F90.2 ATTENTION DEFICIT HYPERACTIVITY DISORDER (ADHD), COMBINED TYPE: ICD-10-CM

## 2022-03-22 DIAGNOSIS — F33.2 SEVERE EPISODE OF RECURRENT MAJOR DEPRESSIVE DISORDER, WITHOUT PSYCHOTIC FEATURES (HCC): ICD-10-CM

## 2022-03-22 DIAGNOSIS — R79.89 LOW VITAMIN D LEVEL: ICD-10-CM

## 2022-03-22 DIAGNOSIS — Z11.3 ROUTINE SCREENING FOR STI (SEXUALLY TRANSMITTED INFECTION): ICD-10-CM

## 2022-03-22 RX ORDER — ARIPIPRAZOLE 2 MG/1
4 TABLET ORAL DAILY
Qty: 30 TABLET | Refills: 0 | Status: SHIPPED | OUTPATIENT
Start: 2022-03-22 | End: 2022-04-20 | Stop reason: SDUPTHER

## 2022-03-22 RX ORDER — DEXTROAMPHETAMINE SACCHARATE, AMPHETAMINE ASPARTATE, DEXTROAMPHETAMINE SULFATE AND AMPHETAMINE SULFATE 3.75; 3.75; 3.75; 3.75 MG/1; MG/1; MG/1; MG/1
15 TABLET ORAL 2 TIMES DAILY
Qty: 60 TABLET | Refills: 0 | Status: SHIPPED | OUTPATIENT
Start: 2022-03-22 | End: 2022-04-20 | Stop reason: SDUPTHER

## 2022-03-28 NOTE — TELEPHONE ENCOUNTER
Labs ordered. We will need both blood and urine. We typically don't check hormone levels when on birth control as those numbers are affected by the birth control. If she would like to increase or change birth control please have her follow up with me. Thanks.

## 2022-04-18 ENCOUNTER — TELEPHONE (OUTPATIENT)
Dept: MEDICAL GROUP | Facility: LAB | Age: 27
End: 2022-04-18
Payer: COMMERCIAL

## 2022-04-19 NOTE — TELEPHONE ENCOUNTER
MEDICATION PRIOR AUTHORIZATION NEEDED:    1. Name of Medication: Abilify 2 mg   Key# [id number]    2. Requested By (Name of Pharmacy): [facility]     3. Is insurance on file current? Yes see [facility] for Rx    4. What is the name & phone number of the 3rd party payor? [facility]

## 2022-04-20 DIAGNOSIS — F33.2 SEVERE EPISODE OF RECURRENT MAJOR DEPRESSIVE DISORDER, WITHOUT PSYCHOTIC FEATURES (HCC): ICD-10-CM

## 2022-04-20 DIAGNOSIS — F90.2 ATTENTION DEFICIT HYPERACTIVITY DISORDER (ADHD), COMBINED TYPE: ICD-10-CM

## 2022-04-20 RX ORDER — ARIPIPRAZOLE 2 MG/1
4 TABLET ORAL DAILY
Qty: 60 TABLET | Refills: 0 | Status: SHIPPED | OUTPATIENT
Start: 2022-04-20 | End: 2022-05-11 | Stop reason: SDUPTHER

## 2022-04-20 RX ORDER — DEXTROAMPHETAMINE SACCHARATE, AMPHETAMINE ASPARTATE, DEXTROAMPHETAMINE SULFATE AND AMPHETAMINE SULFATE 3.75; 3.75; 3.75; 3.75 MG/1; MG/1; MG/1; MG/1
15 TABLET ORAL 2 TIMES DAILY
Qty: 60 TABLET | Refills: 0 | Status: SHIPPED | OUTPATIENT
Start: 2022-04-20 | End: 2022-05-20

## 2022-04-22 ENCOUNTER — TELEPHONE (OUTPATIENT)
Dept: MEDICAL GROUP | Facility: LAB | Age: 27
End: 2022-04-22
Payer: COMMERCIAL

## 2022-04-22 NOTE — TELEPHONE ENCOUNTER
MEDICATION PRIOR AUTHORIZATION NEEDED:    1. Name of Medication: abilify (aripiprazole 2mg)    2. [facility]ed By ([facility]     3. Is insurance on file current? yes    4. What is the name & phone number of the 3rd [facility]      Key: [id number]

## 2022-05-21 DIAGNOSIS — F90.2 ATTENTION DEFICIT HYPERACTIVITY DISORDER (ADHD), COMBINED TYPE: ICD-10-CM

## 2022-05-23 RX ORDER — DEXTROAMPHETAMINE SACCHARATE, AMPHETAMINE ASPARTATE, DEXTROAMPHETAMINE SULFATE AND AMPHETAMINE SULFATE 3.75; 3.75; 3.75; 3.75 MG/1; MG/1; MG/1; MG/1
15 TABLET ORAL 2 TIMES DAILY
Qty: 60 TABLET | Refills: 0 | OUTPATIENT
Start: 2022-05-23 | End: 2022-06-22

## 2022-05-25 ENCOUNTER — OFFICE VISIT (OUTPATIENT)
Dept: MEDICAL GROUP | Facility: LAB | Age: 27
End: 2022-05-25
Payer: COMMERCIAL

## 2022-05-25 VITALS
HEIGHT: 70 IN | OXYGEN SATURATION: 100 % | DIASTOLIC BLOOD PRESSURE: 70 MMHG | BODY MASS INDEX: 17.9 KG/M2 | SYSTOLIC BLOOD PRESSURE: 104 MMHG | HEART RATE: 117 BPM | RESPIRATION RATE: 15 BRPM | TEMPERATURE: 98.5 F | WEIGHT: 125 LBS

## 2022-05-25 DIAGNOSIS — F33.2 SEVERE EPISODE OF RECURRENT MAJOR DEPRESSIVE DISORDER, WITHOUT PSYCHOTIC FEATURES (HCC): ICD-10-CM

## 2022-05-25 DIAGNOSIS — F90.2 ATTENTION DEFICIT HYPERACTIVITY DISORDER (ADHD), COMBINED TYPE: ICD-10-CM

## 2022-05-25 PROCEDURE — 99214 OFFICE O/P EST MOD 30 MIN: CPT | Performed by: FAMILY MEDICINE

## 2022-05-25 RX ORDER — BUPROPION HYDROCHLORIDE 150 MG/1
150 TABLET ORAL EVERY MORNING
Qty: 30 TABLET | Refills: 3 | Status: SHIPPED | OUTPATIENT
Start: 2022-05-25 | End: 2022-06-24

## 2022-05-25 RX ORDER — DEXTROAMPHETAMINE SACCHARATE, AMPHETAMINE ASPARTATE, DEXTROAMPHETAMINE SULFATE AND AMPHETAMINE SULFATE 5; 5; 5; 5 MG/1; MG/1; MG/1; MG/1
20 TABLET ORAL 2 TIMES DAILY
Qty: 60 EACH | Refills: 0 | Status: SHIPPED | OUTPATIENT
Start: 2022-07-24 | End: 2022-08-23

## 2022-05-25 RX ORDER — DEXTROAMPHETAMINE SACCHARATE, AMPHETAMINE ASPARTATE, DEXTROAMPHETAMINE SULFATE AND AMPHETAMINE SULFATE 3.75; 3.75; 3.75; 3.75 MG/1; MG/1; MG/1; MG/1
15 TABLET ORAL 2 TIMES DAILY
Qty: 60 TABLET | Refills: 0 | Status: CANCELLED | OUTPATIENT
Start: 2022-05-25 | End: 2022-06-24

## 2022-05-25 RX ORDER — DEXTROAMPHETAMINE SACCHARATE, AMPHETAMINE ASPARTATE, DEXTROAMPHETAMINE SULFATE AND AMPHETAMINE SULFATE 5; 5; 5; 5 MG/1; MG/1; MG/1; MG/1
20 TABLET ORAL 2 TIMES DAILY
Qty: 60 EACH | Refills: 0 | Status: SHIPPED | OUTPATIENT
Start: 2022-05-25 | End: 2022-08-25 | Stop reason: SDUPTHER

## 2022-05-25 RX ORDER — DEXTROAMPHETAMINE SACCHARATE, AMPHETAMINE ASPARTATE, DEXTROAMPHETAMINE SULFATE AND AMPHETAMINE SULFATE 5; 5; 5; 5 MG/1; MG/1; MG/1; MG/1
20 TABLET ORAL 2 TIMES DAILY
Qty: 60 EACH | Refills: 0 | Status: SHIPPED | OUTPATIENT
Start: 2022-06-24 | End: 2022-07-24

## 2022-05-25 ASSESSMENT — ENCOUNTER SYMPTOMS
VOMITING: 0
PALPITATIONS: 0
DIARRHEA: 0
HEADACHES: 0
INSOMNIA: 0
MEMORY LOSS: 0
DOUBLE VISION: 0
NAUSEA: 0
ABDOMINAL PAIN: 0
DIZZINESS: 0
BLURRED VISION: 0
CONSTIPATION: 0

## 2022-05-25 ASSESSMENT — FIBROSIS 4 INDEX: FIB4 SCORE: 0.46

## 2022-05-25 NOTE — PROGRESS NOTES
Subjective:   St Kelsea Rey is a 26 y.o. female here today for   Chief Complaint   Patient presents with   • Medication Refill   • Medication Management     Discuss increasing some medications.      #Depression anxiety:  -This been a chronic longstanding condition for patient, currently treating with Abilify 2 mg daily.  She has not noticed a significant change in her symptoms after being off the medication for a month given difficulty refilling and wishes to discontinue at this time.  She continues to have symptoms would like to discuss other possible treatment measures.  We have attempted treatment before as well as remotely in patient's history.  Medications previously tried include  Effexor  Zoloft  Prozac  Wellbutrin  Patient states that the Wellbutrin is first used several years ago when she does remember how well it worked or if there is any side effects.  She states the anxiety is mostly controlled due to the current treatment of ADHD.  As long as she continues her Adderall she feels like the anxiety is controlled.  Is still having problems with mood changes, sadness, other symptoms of depression.  Denies any suicidal/homicidal ideations, loose Nations, delusions, paranoia.    #ADHD:  -Currently on Adderall 15 mg twice daily.  She states it has been working; however, has noticed some of the inattentiveness, anxiety, dizziness that accompanies being off the medication.  She states that she always knows when it is time to take the medication in the afternoon as she has increasing symptoms of depression and anxiety.  Would like to discuss new medication regimen at this time.      Allergies   Allergen Reactions   • Latex Hives, Rash and Itching     Rash at contact site         Current medicines (including changes today)  Current Outpatient Medications   Medication Sig Dispense Refill   • buPROPion (WELLBUTRIN XL) 150 MG XL tablet Take 1 Tablet by mouth every morning for 30 days. 30 Tablet 3   •  "amphetamine-dextroamphetamine (ADDERALL) 20 MG Tab Take 1 Tablet by mouth 2 times a day for 30 days. 60 Each 0   • [START ON 6/24/2022] amphetamine-dextroamphetamine (ADDERALL) 20 MG Tab Take 1 Tablet by mouth 2 times a day for 30 days. 60 Each 0   • [START ON 7/24/2022] amphetamine-dextroamphetamine (ADDERALL) 20 MG Tab Take 1 Tablet by mouth 2 times a day for 30 days. 60 Each 0   • ARIPiprazole (ABILIFY) 2 MG tablet Take 2 Tablets by mouth every day for 30 days. 60 Tablet 6   • midodrine (PROAMATINE) 2.5 MG Tab Take 1 Tablet by mouth 2 times a day. 60 Tablet 3   • norethindrone-ethinyl estradiol (MICROGESTIN 1/20) 1-20 MG-MCG per tablet      • fluticasone (FLONASE) 50 MCG/ACT nasal spray Administer 1 Spray into affected nostril(S) 1 time a day as needed. 16 g 11   • albuterol (PROAIR HFA) 108 (90 Base) MCG/ACT Aero Soln inhalation aerosol Inhale 2 Puffs by mouth every 6 hours as needed for Shortness of Breath. 8.5 g 0     No current facility-administered medications for this visit.     She  has a past medical history of ADHD (attention deficit hyperactivity disorder), Allergy, Anemia, Asthma, Depression, Fear of needles, Gynecological disorder, H/O dizziness, History of fainting, Infectious disease, Low blood phosphate, and QT prolongation (1/2009).    ROS   Review of Systems   Eyes: Negative for blurred vision and double vision.   Cardiovascular: Negative for chest pain and palpitations.   Gastrointestinal: Negative for abdominal pain, constipation, diarrhea, nausea and vomiting.   Neurological: Negative for dizziness and headaches.   Psychiatric/Behavioral: Negative for memory loss. The patient does not have insomnia.       Objective:     Physical Exam:  /70   Pulse (!) 117   Temp 36.9 °C (98.5 °F) (Temporal)   Resp 15   Ht 1.778 m (5' 10\")   Wt 56.7 kg (125 lb)   SpO2 100%  Body mass index is 17.94 kg/m².   Constitutional: Alert, no distress.  Skin: Warm, dry, good turgor, no rashes in visible " areas.  Eye: Equal, round and reactive, conjunctiva clear, lids normal.  ENMT: TM's clear bilaterally, lips without lesions, good dentition, oropharynx clear.  Neck: Trachea midline, no masses, no thyromegaly. No cervical or supraclavicular lymphadenopathy.  Respiratory: Unlabored respiratory effort, lungs clear to auscultation, no wheezes, no rhonchi.  Cardiovascular: Normal S1, S2, no murmur, no edema.  Abdomen: Soft, non-tender, no masses, no hepatosplenomegaly.  Psych: Alert and oriented x3, normal affect and mood.    Assessment and Plan:     1. Attention deficit hyperactivity disorder (ADHD), combined type  -Chronic condition, medication change needed.  Given worsening symptoms will increase Adderall 20 mg daily.  Discussed possible side effects.  I am concerned about patient's anxiety as we are introducing Wellbutrin as well as increasing the Adderall.  She will pay close attention to her symptoms of anxiety and follow-up as needed.  -Obtained and reviewed patient utilization report from [facility] pharmacy database on 05/25/2022 to writing prescription for controlled substance II, III or IV per [place] bill . Based on assessment of the report,my physical exam if necessary and the patient's health problem, the prescription is medically necessary.  -Follow up in 3 months.   - amphetamine-dextroamphetamine (ADDERALL) 20 MG Tab; Take 1 Tablet by mouth 2 times a day for 30 days.  Dispense: 60 Each; Refill: 0  - amphetamine-dextroamphetamine (ADDERALL) 20 MG Tab; Take 1 Tablet by mouth 2 times a day for 30 days.  Dispense: 60 Each; Refill: 0  - amphetamine-dextroamphetamine (ADDERALL) 20 MG Tab; Take 1 Tablet by mouth 2 times a day for 30 days.  Dispense: 60 Each; Refill: 0    2. Severe episode of recurrent major depressive disorder, without psychotic features (HCC)  -Chronic condition, unstable.  Medication change needed.  We will discontinue the Abilify at this time and begin treatment with Wellbutrin.   Discussed importance compliance with medication, discussed possible side effects.  I am concerned again for increased anxiety given increase in Adderall with the Wellbutrin.  Patient will monitor symptoms and follow-up as needed.  - buPROPion (WELLBUTRIN XL) 150 MG XL tablet; Take 1 Tablet by mouth every morning for 30 days.  Dispense: 30 Tablet; Refill: 3      Followup: Return in about 3 months (around 8/25/2022).         PLEASE NOTE: This dictation was created using voice recognition software. I have made every reasonable attempt to correct obvious errors, but I expect that there are errors of grammar and possibly content that I did not discover before finalizing the note.

## 2022-06-10 RX ORDER — MIDODRINE HYDROCHLORIDE 2.5 MG/1
2.5 TABLET ORAL 2 TIMES DAILY
Qty: 60 TABLET | Refills: 3 | Status: SHIPPED | OUTPATIENT
Start: 2022-06-10 | End: 2022-08-25 | Stop reason: SDUPTHER

## 2022-06-10 NOTE — TELEPHONE ENCOUNTER
Received request via: Pharmacy    Was the patient seen in the last year in this department? Yes  5/25/2022  Does the patient have an active prescription (recently filled or refills available) for medication(s) requested? No

## 2022-06-27 RX ORDER — NORETHINDRONE ACETATE AND ETHINYL ESTRADIOL .02; 1 MG/1; MG/1
TABLET ORAL
Qty: 84 TABLET | Refills: 2 | Status: SHIPPED | OUTPATIENT
Start: 2022-06-27 | End: 2023-04-22 | Stop reason: SDUPTHER

## 2022-06-27 NOTE — TELEPHONE ENCOUNTER
Received request via: Pharmacy    Was the patient seen in the last year in this department? Yes  LOV : 5/25/2022   Does the patient have an active prescription (recently filled or refills available) for medication(s) requested? No

## 2022-08-25 ENCOUNTER — OFFICE VISIT (OUTPATIENT)
Dept: MEDICAL GROUP | Facility: LAB | Age: 27
End: 2022-08-25
Payer: COMMERCIAL

## 2022-08-25 VITALS
DIASTOLIC BLOOD PRESSURE: 60 MMHG | TEMPERATURE: 98.4 F | HEIGHT: 70 IN | RESPIRATION RATE: 15 BRPM | OXYGEN SATURATION: 100 % | WEIGHT: 131 LBS | BODY MASS INDEX: 18.75 KG/M2 | HEART RATE: 68 BPM | SYSTOLIC BLOOD PRESSURE: 94 MMHG

## 2022-08-25 DIAGNOSIS — B37.9 YEAST INFECTION: ICD-10-CM

## 2022-08-25 DIAGNOSIS — F33.2 SEVERE EPISODE OF RECURRENT MAJOR DEPRESSIVE DISORDER, WITHOUT PSYCHOTIC FEATURES (HCC): ICD-10-CM

## 2022-08-25 DIAGNOSIS — F90.2 ATTENTION DEFICIT HYPERACTIVITY DISORDER (ADHD), COMBINED TYPE: ICD-10-CM

## 2022-08-25 PROBLEM — Z87.42 HX OF ABNORMAL CERVICAL PAP SMEAR: Status: ACTIVE | Noted: 2022-08-25

## 2022-08-25 PROCEDURE — 99214 OFFICE O/P EST MOD 30 MIN: CPT | Performed by: FAMILY MEDICINE

## 2022-08-25 RX ORDER — FLUCONAZOLE 150 MG/1
TABLET ORAL
Qty: 2 TABLET | Refills: 1 | Status: SHIPPED | OUTPATIENT
Start: 2022-08-25 | End: 2023-04-25 | Stop reason: SDUPTHER

## 2022-08-25 RX ORDER — ESCITALOPRAM OXALATE 10 MG/1
10 TABLET ORAL DAILY
Qty: 30 TABLET | Refills: 6 | Status: SHIPPED
Start: 2022-08-25 | End: 2022-09-21

## 2022-08-25 RX ORDER — DEXTROAMPHETAMINE SACCHARATE, AMPHETAMINE ASPARTATE, DEXTROAMPHETAMINE SULFATE AND AMPHETAMINE SULFATE 5; 5; 5; 5 MG/1; MG/1; MG/1; MG/1
20 TABLET ORAL 2 TIMES DAILY
Qty: 60 EACH | Refills: 0 | Status: SHIPPED | OUTPATIENT
Start: 2022-10-14 | End: 2022-11-02 | Stop reason: SDUPTHER

## 2022-08-25 RX ORDER — DEXTROAMPHETAMINE SACCHARATE, AMPHETAMINE ASPARTATE, DEXTROAMPHETAMINE SULFATE AND AMPHETAMINE SULFATE 5; 5; 5; 5 MG/1; MG/1; MG/1; MG/1
20 TABLET ORAL 2 TIMES DAILY
Qty: 60 EACH | Refills: 0 | Status: SHIPPED | OUTPATIENT
Start: 2022-08-25 | End: 2022-09-24

## 2022-08-25 RX ORDER — DEXTROAMPHETAMINE SACCHARATE, AMPHETAMINE ASPARTATE, DEXTROAMPHETAMINE SULFATE AND AMPHETAMINE SULFATE 5; 5; 5; 5 MG/1; MG/1; MG/1; MG/1
20 TABLET ORAL 2 TIMES DAILY
Qty: 60 EACH | Refills: 0 | Status: SHIPPED | OUTPATIENT
Start: 2022-09-14 | End: 2022-10-14

## 2022-08-25 RX ORDER — BUPROPION HYDROCHLORIDE 150 MG/1
TABLET ORAL
COMMUNITY
Start: 2022-08-07 | End: 2022-08-25

## 2022-08-25 RX ORDER — MIDODRINE HYDROCHLORIDE 2.5 MG/1
2.5 TABLET ORAL 2 TIMES DAILY
Qty: 60 TABLET | Refills: 3 | Status: SHIPPED | OUTPATIENT
Start: 2022-08-25

## 2022-08-25 ASSESSMENT — ENCOUNTER SYMPTOMS
CHILLS: 0
CONSTIPATION: 0
WHEEZING: 0
SHORTNESS OF BREATH: 0
INSOMNIA: 0
FEVER: 0
VOMITING: 0
NAUSEA: 0
ABDOMINAL PAIN: 0
MEMORY LOSS: 0
DIARRHEA: 0

## 2022-08-25 ASSESSMENT — LIFESTYLE VARIABLES: SUBSTANCE_ABUSE: 0

## 2022-08-25 ASSESSMENT — FIBROSIS 4 INDEX: FIB4 SCORE: 0.46

## 2022-08-25 NOTE — PROGRESS NOTES
"Subjective:   St Kelsea Rey is a 26 y.o. female here today for   No chief complaint on file.        ***    Allergies   Allergen Reactions    Latex Hives, Rash and Itching     Rash at contact site         Current medicines (including changes today)  Current Outpatient Medications   Medication Sig Dispense Refill    norethindrone-ethinyl estradiol (MICROGESTIN 1/20) 1-20 MG-MCG per tablet TAKE 1 TABLET DAILY 84 Tablet 2    midodrine (PROAMATINE) 2.5 MG Tab Take 1 Tablet by mouth 2 times a day. 60 Tablet 3    buPROPion (WELLBUTRIN XL) 150 MG XL tablet       fluticasone (FLONASE) 50 MCG/ACT nasal spray Administer 1 Spray into affected nostril(S) 1 time a day as needed. 16 g 11    albuterol (PROAIR HFA) 108 (90 Base) MCG/ACT Aero Soln inhalation aerosol Inhale 2 Puffs by mouth every 6 hours as needed for Shortness of Breath. 8.5 g 0     No current facility-administered medications for this visit.     She  has a past medical history of ADHD (attention deficit hyperactivity disorder), Allergy, Anemia, Asthma, Depression, Fear of needles, Gynecological disorder, H/O dizziness, History of fainting, Infectious disease, Low blood phosphate, and QT prolongation (1/2009).    ROS   ROS       Objective:     Physical Exam:  BP (!) 94/60   Pulse 68   Temp 36.9 °C (98.4 °F) (Temporal)   Resp 15   Ht 1.778 m (5' 10\")   Wt 59.4 kg (131 lb)   SpO2 100%  Body mass index is 18.8 kg/m². ***  Constitutional: Alert, no distress.  Skin: Warm, dry, good turgor, no rashes in visible areas.  Eye: Equal, round and reactive, conjunctiva clear, lids normal.  ENMT: TM's clear bilaterally, lips without lesions, good dentition, oropharynx clear.  Neck: Trachea midline, no masses, no thyromegaly. No cervical or supraclavicular lymphadenopathy.  Respiratory: Unlabored respiratory effort, lungs clear to auscultation, no wheezes, no rhonchi.  Cardiovascular: Normal S1, S2, no murmur, no edema.  Abdomen: Soft, non-tender, no masses, no " hepatosplenomegaly.  Psych: Alert and oriented x3, normal affect and mood.    Assessment and Plan:     1. Attention deficit hyperactivity disorder (ADHD), combined type  ***      Followup: No follow-ups on file.         PLEASE NOTE: This dictation was created using voice recognition software. I have made every reasonable attempt to correct obvious errors, but I expect that there are errors of grammar and possibly content that I did not discover before finalizing the note.

## 2022-08-25 NOTE — PROGRESS NOTES
"Subjective:   St Kelsea Rey is a 26 y.o. female here today for   No chief complaint on file.    #ADHD:  -Chronic longstanding condition currently treated with Adderall 20 mg twice daily.  Patient is compliant with medication, denies any side effects.  Denies any drug diversion, concurrent use of recreational drugs.  Here today requesting refill of medication.    #Depression/anxiety:  -Chronic longstanding condition currently treated with Wellbutrin.  Been on several different medications prior to Wellbutrin including Effexor, fluoxetine, sertraline.  She states that since starting Wellbutrin he has noticed some significant side effects including increased fatigue, becoming extremely short tempered and extremely angry.  She states that she was previously been on Wellbutrin when she was 17 and she has been \"experiencing all the symptoms like I was 17 again\".  She denies any increased fatigue, suicidal/homicidal ideations, delusions, paranoia.  Occasional visual hallucination but overall feeling well.  She is restarting school and would like to discuss starting a new antidepressant to help with her depression and anxiety especially in light of the stressors of, schooling.    #Yeast infection:  -Patient states for last several weeks has been having symptoms of vaginal discharge, vaginal/vulvar swelling, pain/pressure with intercourse.  She states the discharge has improved but continues to have a lot of vaginal itching and pressure with intercourse.  Denies any new sexual partners.      Allergies   Allergen Reactions    Latex Hives, Rash and Itching     Rash at contact site         Current medicines (including changes today)  Current Outpatient Medications   Medication Sig Dispense Refill    amphetamine-dextroamphetamine (ADDERALL) 20 MG Tab Take 1 Tablet by mouth 2 times a day for 30 days. 60 Each 0    [START ON 9/14/2022] amphetamine-dextroamphetamine (ADDERALL) 20 MG Tab Take 1 Tablet by mouth 2 times a " "day for 30 days. 60 Each 0    [START ON 10/14/2022] amphetamine-dextroamphetamine (ADDERALL) 20 MG Tab Take 1 Tablet by mouth 2 times a day for 30 days. 60 Each 0    midodrine (PROAMATINE) 2.5 MG Tab Take 1 Tablet by mouth 2 times a day. 60 Tablet 3    escitalopram (LEXAPRO) 10 MG Tab Take 1 Tablet by mouth every day for 30 days. 30 Tablet 6    fluconazole (DIFLUCAN) 150 MG tablet Take 1 tab po now. Take 2nd tab po in 72 hours. 2 Tablet 1    norethindrone-ethinyl estradiol (MICROGESTIN 1/20) 1-20 MG-MCG per tablet TAKE 1 TABLET DAILY 84 Tablet 2    fluticasone (FLONASE) 50 MCG/ACT nasal spray Administer 1 Spray into affected nostril(S) 1 time a day as needed. 16 g 11    albuterol (PROAIR HFA) 108 (90 Base) MCG/ACT Aero Soln inhalation aerosol Inhale 2 Puffs by mouth every 6 hours as needed for Shortness of Breath. 8.5 g 0     No current facility-administered medications for this visit.     She  has a past medical history of ADHD (attention deficit hyperactivity disorder), Allergy, Anemia, Asthma, Depression, Fear of needles, Gynecological disorder, H/O dizziness, History of fainting, Infectious disease, Low blood phosphate, and QT prolongation (1/2009).    ROS   Review of Systems   Constitutional:  Negative for chills and fever.   Respiratory:  Negative for shortness of breath and wheezing.    Cardiovascular:  Negative for chest pain.   Gastrointestinal:  Negative for abdominal pain, constipation, diarrhea, nausea and vomiting.   Psychiatric/Behavioral:  Negative for memory loss, substance abuse and suicidal ideas. The patient does not have insomnia.       Objective:     Physical Exam:  BP (!) 94/60   Pulse 68   Temp 36.9 °C (98.4 °F) (Temporal)   Resp 15   Ht 1.778 m (5' 10\")   Wt 59.4 kg (131 lb)   SpO2 100%  Body mass index is 18.8 kg/m².   Constitutional: Alert, no distress.  Skin: Warm, dry, good turgor, no rashes in visible areas.  Eye: Equal, round and reactive, conjunctiva clear, lids " normal.  Respiratory: Unlabored respiratory effort  Psych: Alert and oriented x3, normal affect and mood.    Assessment and Plan:     1. Attention deficit hyperactivity disorder (ADHD), combined type  -Status: Stable.  We will continue with Adderall at this time.  -Obtained and reviewed patient utilization report from [facility] pharmacy database on 08/25/2022 to writing prescription for controlled substance II, III or IV per [place] bill . Based on assessment of the report,my physical exam if necessary and the patient's health problem, the prescription is medically necessary.  - amphetamine-dextroamphetamine (ADDERALL) 20 MG Tab; Take 1 Tablet by mouth 2 times a day for 30 days.  Dispense: 60 Each; Refill: 0  - amphetamine-dextroamphetamine (ADDERALL) 20 MG Tab; Take 1 Tablet by mouth 2 times a day for 30 days.  Dispense: 60 Each; Refill: 0  - amphetamine-dextroamphetamine (ADDERALL) 20 MG Tab; Take 1 Tablet by mouth 2 times a day for 30 days.  Dispense: 60 Each; Refill: 0    2. Severe episode of recurrent major depressive disorder, without psychotic features (HCC)  -Status: Unstable.  Medication change needed.  We will discontinue Wellbutrin, begin Lexapro.  Discussed possible side effects, discussed importance of compliance with medication.  Patient will follow-up in 1 month for medication check.  - escitalopram (LEXAPRO) 10 MG Tab; Take 1 Tablet by mouth every day for 30 days.  Dispense: 30 Tablet; Refill: 6    3. Yeast infection  -Signs and symptoms are consistent with yeast infection.  We will begin with fluconazole treatment.  If no improvement in the next week the patient will follow-up for further testing.  - fluconazole (DIFLUCAN) 150 MG tablet; Take 1 tab po now. Take 2nd tab po in 72 hours.  Dispense: 2 Tablet; Refill: 1      Followup: Return in about 4 weeks (around 9/22/2022).         PLEASE NOTE: This dictation was created using voice recognition software. I have made every reasonable attempt to  correct obvious errors, but I expect that there are errors of grammar and possibly content that I did not discover before finalizing the note.

## 2022-09-21 ENCOUNTER — OFFICE VISIT (OUTPATIENT)
Dept: MEDICAL GROUP | Facility: LAB | Age: 27
End: 2022-09-21
Payer: COMMERCIAL

## 2022-09-21 VITALS
RESPIRATION RATE: 14 BRPM | BODY MASS INDEX: 17.61 KG/M2 | WEIGHT: 123 LBS | HEIGHT: 70 IN | SYSTOLIC BLOOD PRESSURE: 104 MMHG | OXYGEN SATURATION: 100 % | TEMPERATURE: 98 F | DIASTOLIC BLOOD PRESSURE: 70 MMHG | HEART RATE: 78 BPM

## 2022-09-21 DIAGNOSIS — F90.2 ATTENTION DEFICIT HYPERACTIVITY DISORDER (ADHD), COMBINED TYPE: ICD-10-CM

## 2022-09-21 DIAGNOSIS — F33.2 SEVERE EPISODE OF RECURRENT MAJOR DEPRESSIVE DISORDER, WITHOUT PSYCHOTIC FEATURES (HCC): ICD-10-CM

## 2022-09-21 PROCEDURE — 99214 OFFICE O/P EST MOD 30 MIN: CPT | Performed by: FAMILY MEDICINE

## 2022-09-21 RX ORDER — DESVENLAFAXINE 25 MG/1
25 TABLET, EXTENDED RELEASE ORAL DAILY
Qty: 30 TABLET | Refills: 11 | Status: SHIPPED | OUTPATIENT
Start: 2022-09-21 | End: 2022-10-24 | Stop reason: SDUPTHER

## 2022-09-21 ASSESSMENT — ENCOUNTER SYMPTOMS
CHILLS: 0
NAUSEA: 0
CONSTIPATION: 0
DIARRHEA: 0
WHEEZING: 0
PALPITATIONS: 0
ABDOMINAL PAIN: 0
FEVER: 0
VOMITING: 0
SHORTNESS OF BREATH: 0

## 2022-09-21 ASSESSMENT — FIBROSIS 4 INDEX: FIB4 SCORE: 0.46

## 2022-09-21 NOTE — PROGRESS NOTES
"Subjective:   St Kelsea Rey is a 26 y.o. female here today for   No chief complaint on file.      #ADHD:  -Chronic condition, treat with Adderall 20 mg twice a day.  Patient states that she is doing well and has noticed a significant improvement in symptoms.  While in school she is taking medication on a steady basis.  She has been taking medication breaks as needed.  Denies any headaches, changes in vision, chest pain, abdominal pain, nausea, vomiting.    #Depression:  -Due to increasing depressive symptoms we started patient on Lexapro a month ago.  She has been compliant with medication, has had some concerning side effects including more symptoms of anhedonia, difficulty with orgasm, low libido.  Patient has a longstanding history of depression has been on several different SSRIs including Zoloft, Lexapro.  Has also been on trials of Effexor as well as Wellbutrin.  Patient states that she did not like being on Effexor; however, she did not like \"coming off\" medication as it caused, which she described as, withdrawal symptoms.  She would like to work on improving the symptoms of anhedonia and apathy at this time is here to discuss further medication.  Denies any suicidal/homicidal ideations, hallucinations, delusions, paranoia.    Allergies   Allergen Reactions    Latex Hives, Rash and Itching     Rash at contact site         Current medicines (including changes today)  Current Outpatient Medications   Medication Sig Dispense Refill    Desvenlafaxine Succinate ER 25 MG TABLET SR 24 HR Take 25 mg by mouth every day for 30 days. 30 Tablet 11    norethindrone-ethinyl estradiol (MICROGESTIN 1/20) 1-20 MG-MCG per tablet TAKE 1 TABLET DAILY 84 Tablet 2    amphetamine-dextroamphetamine (ADDERALL) 20 MG Tab Take 1 Tablet by mouth 2 times a day for 30 days. 60 Each 0    amphetamine-dextroamphetamine (ADDERALL) 20 MG Tab Take 1 Tablet by mouth 2 times a day for 30 days. 60 Each 0    [START ON 10/14/2022] " "amphetamine-dextroamphetamine (ADDERALL) 20 MG Tab Take 1 Tablet by mouth 2 times a day for 30 days. 60 Each 0    midodrine (PROAMATINE) 2.5 MG Tab Take 1 Tablet by mouth 2 times a day. 60 Tablet 3    fluconazole (DIFLUCAN) 150 MG tablet Take 1 tab po now. Take 2nd tab po in 72 hours. 2 Tablet 1    fluticasone (FLONASE) 50 MCG/ACT nasal spray Administer 1 Spray into affected nostril(S) 1 time a day as needed. 16 g 11    albuterol (PROAIR HFA) 108 (90 Base) MCG/ACT Aero Soln inhalation aerosol Inhale 2 Puffs by mouth every 6 hours as needed for Shortness of Breath. 8.5 g 0     No current facility-administered medications for this visit.     She  has a past medical history of ADHD (attention deficit hyperactivity disorder), Allergy, Anemia, Asthma, Depression, Fear of needles, Gynecological disorder, H/O dizziness, History of fainting, Infectious disease, Low blood phosphate, and QT prolongation (1/2009).    ROS   Review of Systems   Constitutional:  Negative for chills and fever.   Respiratory:  Negative for shortness of breath and wheezing.    Cardiovascular:  Negative for chest pain and palpitations.   Gastrointestinal:  Negative for abdominal pain, constipation, diarrhea, nausea and vomiting.        Objective:     Physical Exam:  /70   Pulse 78   Temp 36.7 °C (98 °F) (Temporal)   Resp 14   Ht 1.778 m (5' 10\")   Wt 55.8 kg (123 lb)   SpO2 100%  Body mass index is 17.65 kg/m².   Constitutional: Alert, no distress.  Skin: Warm, dry, good turgor, no rashes in visible areas.  Eye: Equal, round and reactive, conjunctiva clear, lids normal.  Respiratory: Unlabored respiratory effort, lungs clear to auscultation, no wheezes, no rhonchi.  Cardiovascular: Normal S1, S2, no murmur, no edema.  Abdomen: Soft, non-tender, no masses, no hepatosplenomegaly.  Psych: Alert and oriented x3, normal affect and mood.    Assessment and Plan:   1. Attention deficit hyperactivity disorder (ADHD), combined type  -Status: " Stable, no concerns at this time, will continue with Adderall 20 mg twice daily.  Patient will follow-up with medication refills as needed.    2. Severe episode of recurrent major depressive disorder, without psychotic features (HCC)  -Unstable.  Given the possibility of worsening side effects to the Effexor we will discontinue it and start patient on SNRI to hopefully help with motivation as well as decrease the possibility for sexual side effects.  We will start Pristiq.  Discussed importance of compliance with medication, discussed possible side effects.  Patient will follow-up in 1 month for medication check.  - Desvenlafaxine Succinate ER 25 MG TABLET SR 24 HR; Take 25 mg by mouth every day for 30 days.  Dispense: 30 Tablet; Refill: 11      Followup: No follow-ups on file.         PLEASE NOTE: This dictation was created using voice recognition software. I have made every reasonable attempt to correct obvious errors, but I expect that there are errors of grammar and possibly content that I did not discover before finalizing the note.

## 2022-10-24 ENCOUNTER — APPOINTMENT (OUTPATIENT)
Dept: MEDICAL GROUP | Facility: LAB | Age: 27
End: 2022-10-24
Payer: COMMERCIAL

## 2022-10-24 DIAGNOSIS — F33.2 SEVERE EPISODE OF RECURRENT MAJOR DEPRESSIVE DISORDER, WITHOUT PSYCHOTIC FEATURES (HCC): ICD-10-CM

## 2022-10-26 RX ORDER — DESVENLAFAXINE 25 MG/1
25 TABLET, EXTENDED RELEASE ORAL DAILY
Qty: 30 TABLET | Refills: 11 | Status: SHIPPED | OUTPATIENT
Start: 2022-10-26 | End: 2022-11-25

## 2022-11-02 ENCOUNTER — OFFICE VISIT (OUTPATIENT)
Dept: MEDICAL GROUP | Facility: LAB | Age: 27
End: 2022-11-02
Payer: COMMERCIAL

## 2022-11-02 VITALS
OXYGEN SATURATION: 100 % | RESPIRATION RATE: 14 BRPM | TEMPERATURE: 98.2 F | HEIGHT: 70 IN | DIASTOLIC BLOOD PRESSURE: 70 MMHG | WEIGHT: 131 LBS | SYSTOLIC BLOOD PRESSURE: 92 MMHG | BODY MASS INDEX: 18.75 KG/M2 | HEART RATE: 78 BPM

## 2022-11-02 DIAGNOSIS — Z23 NEED FOR VACCINATION: ICD-10-CM

## 2022-11-02 DIAGNOSIS — F33.2 SEVERE EPISODE OF RECURRENT MAJOR DEPRESSIVE DISORDER, WITHOUT PSYCHOTIC FEATURES (HCC): ICD-10-CM

## 2022-11-02 DIAGNOSIS — Z79.899 CONTROLLED SUBSTANCE AGREEMENT SIGNED: ICD-10-CM

## 2022-11-02 DIAGNOSIS — F90.2 ATTENTION DEFICIT HYPERACTIVITY DISORDER (ADHD), COMBINED TYPE: ICD-10-CM

## 2022-11-02 PROCEDURE — 99214 OFFICE O/P EST MOD 30 MIN: CPT | Mod: 25 | Performed by: FAMILY MEDICINE

## 2022-11-02 PROCEDURE — 90471 IMMUNIZATION ADMIN: CPT | Performed by: FAMILY MEDICINE

## 2022-11-02 PROCEDURE — 90472 IMMUNIZATION ADMIN EACH ADD: CPT | Performed by: FAMILY MEDICINE

## 2022-11-02 PROCEDURE — 90686 IIV4 VACC NO PRSV 0.5 ML IM: CPT | Performed by: FAMILY MEDICINE

## 2022-11-02 PROCEDURE — 90677 PCV20 VACCINE IM: CPT | Performed by: FAMILY MEDICINE

## 2022-11-02 RX ORDER — DEXTROAMPHETAMINE SACCHARATE, AMPHETAMINE ASPARTATE, DEXTROAMPHETAMINE SULFATE AND AMPHETAMINE SULFATE 5; 5; 5; 5 MG/1; MG/1; MG/1; MG/1
20 TABLET ORAL 2 TIMES DAILY
Qty: 60 EACH | Refills: 0 | Status: SHIPPED | OUTPATIENT
Start: 2023-01-01 | End: 2023-01-31

## 2022-11-02 RX ORDER — DESVENLAFAXINE SUCCINATE 50 MG/1
50 TABLET, EXTENDED RELEASE ORAL DAILY
Qty: 90 TABLET | Refills: 3 | Status: SHIPPED | OUTPATIENT
Start: 2022-11-02 | End: 2023-02-15 | Stop reason: SDUPTHER

## 2022-11-02 RX ORDER — DEXTROAMPHETAMINE SACCHARATE, AMPHETAMINE ASPARTATE, DEXTROAMPHETAMINE SULFATE AND AMPHETAMINE SULFATE 5; 5; 5; 5 MG/1; MG/1; MG/1; MG/1
20 TABLET ORAL 2 TIMES DAILY
Qty: 60 EACH | Refills: 0 | Status: SHIPPED | OUTPATIENT
Start: 2022-12-02 | End: 2023-02-02 | Stop reason: SDUPTHER

## 2022-11-02 RX ORDER — DEXTROAMPHETAMINE SACCHARATE, AMPHETAMINE ASPARTATE, DEXTROAMPHETAMINE SULFATE AND AMPHETAMINE SULFATE 5; 5; 5; 5 MG/1; MG/1; MG/1; MG/1
20 TABLET ORAL 2 TIMES DAILY
Qty: 60 EACH | Refills: 0 | Status: SHIPPED | OUTPATIENT
Start: 2023-01-31 | End: 2023-03-02

## 2022-11-02 ASSESSMENT — ENCOUNTER SYMPTOMS
WHEEZING: 0
NERVOUS/ANXIOUS: 0
INSOMNIA: 0
DEPRESSION: 1
VOMITING: 0
PALPITATIONS: 0
FEVER: 0
CONSTIPATION: 0
CHILLS: 0
NAUSEA: 0
SHORTNESS OF BREATH: 0
DIARRHEA: 0
ABDOMINAL PAIN: 0

## 2022-11-02 ASSESSMENT — FIBROSIS 4 INDEX: FIB4 SCORE: 0.46

## 2022-11-02 NOTE — PROGRESS NOTES
Subjective:   St Kelsea Rey is a 26 y.o. female here today for   Chief Complaint   Patient presents with    Medication Refill     Adderall     Immunizations     PCV, FLU        #Depression:  -Recently switched from Effexor to Pristiq given side effects.  Has noticed some change to energy level but anhedonia remains.  She was having some side effects of sexual dysfunction, difficulty with orgasm.  This has improved with the new medication.  She has started noticing some abnormal dreams but does appear to be tolerable at this time.  Denies any suicidal/homicidal ideations.    #ADHD:  -Currently on Adderall 20 mg twice daily.  Compliant with medication denies any concomitant use of recreational illicit drugs, drug diversion.  Requesting refill of medication.    #Health maintenance:  -Due for flu shot.  -Given history of asthma due for pneumonia shot.    Allergies   Allergen Reactions    Latex Hives, Rash and Itching     Rash at contact site         Current medicines (including changes today)  Current Outpatient Medications   Medication Sig Dispense Refill    norethindrone-ethinyl estradiol (MICROGESTIN 1/20) 1-20 MG-MCG per tablet TAKE 1 TABLET DAILY 84 Tablet 2    Desvenlafaxine Succinate ER 25 MG TABLET SR 24 HR Take 25 mg by mouth every day for 30 days. 30 Tablet 11    amphetamine-dextroamphetamine (ADDERALL) 20 MG Tab Take 1 Tablet by mouth 2 times a day for 30 days. 60 Each 0    midodrine (PROAMATINE) 2.5 MG Tab Take 1 Tablet by mouth 2 times a day. 60 Tablet 3    fluconazole (DIFLUCAN) 150 MG tablet Take 1 tab po now. Take 2nd tab po in 72 hours. 2 Tablet 1    fluticasone (FLONASE) 50 MCG/ACT nasal spray Administer 1 Spray into affected nostril(S) 1 time a day as needed. 16 g 11    albuterol (PROAIR HFA) 108 (90 Base) MCG/ACT Aero Soln inhalation aerosol Inhale 2 Puffs by mouth every 6 hours as needed for Shortness of Breath. 8.5 g 0     No current facility-administered medications for this visit.  "    She  has a past medical history of ADHD (attention deficit hyperactivity disorder), Allergy, Anemia, Asthma, Depression, Fear of needles, Gynecological disorder, H/O dizziness, History of fainting, Infectious disease, Low blood phosphate, and QT prolongation (1/2009).    ROS   Review of Systems   Constitutional:  Negative for chills and fever.   Respiratory:  Negative for shortness of breath and wheezing.    Cardiovascular:  Negative for chest pain and palpitations.   Gastrointestinal:  Negative for abdominal pain, constipation, diarrhea, nausea and vomiting.   Psychiatric/Behavioral:  Positive for depression. The patient is not nervous/anxious and does not have insomnia.         Objective:     Physical Exam:  BP (!) 92/70   Pulse 78   Temp 36.8 °C (98.2 °F) (Temporal)   Resp 14   Ht 1.778 m (5' 10\")   Wt 59.4 kg (131 lb)   SpO2 100%  Body mass index is 18.8 kg/m².   Constitutional: Alert, no distress.  Skin: Warm, dry, good turgor, no rashes in visible areas.  Eye: Equal, round and reactive, conjunctiva clear, lids normal.  Respiratory: Unlabored respiratory effort  Psych: Alert and oriented x3, normal affect and mood.    Assessment and Plan:     1. Attention deficit hyperactivity disorder (ADHD), combined type  -Stable.  No concerns.  Refill medication at this time.  -Substance use agreement signed and filed.  -Due for urine drug screening.  -Patient will follow-up for refill medication 3 months.  -Obtained and reviewed patient utilization report from [facility] pharmacy database on 11/02/2022 to writing prescription for controlled substance II, III or IV per [place] bill . Based on assessment of the report,my physical exam if necessary and the patient's health problem, the prescription is medically necessary.  - amphetamine-dextroamphetamine (ADDERALL) 20 MG Tab; Take 1 Tablet by mouth 2 times a day for 30 days.  Dispense: 60 Each; Refill: 0  - amphetamine-dextroamphetamine (ADDERALL) 20 MG Tab; " Take 1 Tablet by mouth 2 times a day for 30 days.  Dispense: 60 Each; Refill: 0  - amphetamine-dextroamphetamine (ADDERALL) 20 MG Tab; Take 1 Tablet by mouth 2 times a day for 30 days.  Dispense: 60 Each; Refill: 0  - Controlled Substance Treatment Agreement    2. Severe episode of recurrent major depressive disorder, without psychotic features (HCC)  -Status: Unstable.  While we are seeing some improvement, patient continues to have symptoms of anhedonia, depression.  Will augment Pristiq to 50 mg daily.  Maxim possible side effects, ports compliance with medication.  Patient will follow-up in 3 months, sooner if needed.  - desvenlafaxine succinate (PRISTIQ) 50 MG TABLET SR 24 HR; Take 1 Tablet by mouth every day for 360 days.  Dispense: 90 Tablet; Refill: 3    3. Need for vaccination  -Patient was agreeable to receiving the influenza, pneumococcal vaccine in clinic today after discussion of potential risks, benefits, and side effects. Vaccine was administered without adverse effects.  - INFLUENZA VACCINE QUAD INJ (PF)  - Pneumococcal Conjugate Vaccine 20-Valent (19 yrs+)      Followup: No follow-ups on file.         PLEASE NOTE: This dictation was created using voice recognition software. I have made every reasonable attempt to correct obvious errors, but I expect that there are errors of grammar and possibly content that I did not discover before finalizing the note.

## 2023-01-31 ENCOUNTER — APPOINTMENT (RX ONLY)
Dept: URBAN - METROPOLITAN AREA CLINIC 4 | Facility: CLINIC | Age: 28
Setting detail: DERMATOLOGY
End: 2023-01-31

## 2023-01-31 DIAGNOSIS — D22 MELANOCYTIC NEVI: ICD-10-CM

## 2023-01-31 DIAGNOSIS — D18.0 HEMANGIOMA: ICD-10-CM

## 2023-01-31 DIAGNOSIS — L738 OTHER SPECIFIED DISEASES OF HAIR AND HAIR FOLLICLES: ICD-10-CM | Status: INADEQUATELY CONTROLLED

## 2023-01-31 DIAGNOSIS — L91.8 OTHER HYPERTROPHIC DISORDERS OF THE SKIN: ICD-10-CM

## 2023-01-31 DIAGNOSIS — Q82.5 CONGENITAL NON-NEOPLASTIC NEVUS: ICD-10-CM

## 2023-01-31 DIAGNOSIS — L663 OTHER SPECIFIED DISEASES OF HAIR AND HAIR FOLLICLES: ICD-10-CM | Status: INADEQUATELY CONTROLLED

## 2023-01-31 DIAGNOSIS — Z71.89 OTHER SPECIFIED COUNSELING: ICD-10-CM

## 2023-01-31 DIAGNOSIS — L73.9 FOLLICULAR DISORDER, UNSPECIFIED: ICD-10-CM | Status: INADEQUATELY CONTROLLED

## 2023-01-31 PROBLEM — D22.5 MELANOCYTIC NEVI OF TRUNK: Status: ACTIVE | Noted: 2023-01-31

## 2023-01-31 PROBLEM — D22.72 MELANOCYTIC NEVI OF LEFT LOWER LIMB, INCLUDING HIP: Status: ACTIVE | Noted: 2023-01-31

## 2023-01-31 PROBLEM — D22.21 MELANOCYTIC NEVI OF RIGHT EAR AND EXTERNAL AURICULAR CANAL: Status: ACTIVE | Noted: 2023-01-31

## 2023-01-31 PROBLEM — M26.69 OTHER SPECIFIED DISORDERS OF TEMPOROMANDIBULAR JOINT: Status: ACTIVE | Noted: 2023-01-31

## 2023-01-31 PROBLEM — D22.122 MELANOCYTIC NEVI OF LEFT LOWER EYELID, INCLUDING CANTHUS: Status: ACTIVE | Noted: 2023-01-31

## 2023-01-31 PROBLEM — D18.01 HEMANGIOMA OF SKIN AND SUBCUTANEOUS TISSUE: Status: ACTIVE | Noted: 2023-01-31

## 2023-01-31 PROBLEM — L02.92 FURUNCLE, UNSPECIFIED: Status: ACTIVE | Noted: 2023-01-31

## 2023-01-31 PROCEDURE — 99204 OFFICE O/P NEW MOD 45 MIN: CPT

## 2023-01-31 PROCEDURE — ? ADDITIONAL NOTES

## 2023-01-31 PROCEDURE — ? COUNSELING

## 2023-01-31 PROCEDURE — ? PRESCRIPTION

## 2023-01-31 PROCEDURE — ? PHOTO-DOCUMENTATION

## 2023-01-31 RX ORDER — BENZOYL PEROXIDE 100 MG/G
1 LOTION TOPICAL QD
Qty: 237 | Refills: 3 | Status: ERX | COMMUNITY
Start: 2023-01-31

## 2023-01-31 RX ORDER — CLINDAMYCIN PHOSPHATE 10 MG/ML
1 SOLUTION TOPICAL QD
Qty: 60 | Refills: 6 | Status: ERX | COMMUNITY
Start: 2023-01-31

## 2023-01-31 RX ADMIN — BENZOYL PEROXIDE 1: 100 LOTION TOPICAL at 00:00

## 2023-01-31 RX ADMIN — CLINDAMYCIN PHOSPHATE 1: 10 SOLUTION TOPICAL at 00:00

## 2023-01-31 ASSESSMENT — LOCATION ZONE DERM
LOCATION ZONE: EAR
LOCATION ZONE: LEG
LOCATION ZONE: TRUNK
LOCATION ZONE: AXILLAE
LOCATION ZONE: EYELID
LOCATION ZONE: SCALP

## 2023-01-31 ASSESSMENT — LOCATION SIMPLE DESCRIPTION DERM
LOCATION SIMPLE: LEFT AXILLARY VAULT
LOCATION SIMPLE: LEFT BUTTOCK
LOCATION SIMPLE: RIGHT KNEE
LOCATION SIMPLE: RIGHT UPPER BACK
LOCATION SIMPLE: LEFT INFERIOR EYELID
LOCATION SIMPLE: ABDOMEN
LOCATION SIMPLE: LEFT ANKLE
LOCATION SIMPLE: LEFT POSTERIOR THIGH
LOCATION SIMPLE: LEFT SCALP
LOCATION SIMPLE: RIGHT EAR

## 2023-01-31 ASSESSMENT — LOCATION DETAILED DESCRIPTION DERM
LOCATION DETAILED: RIGHT INFERIOR LATERAL UPPER BACK
LOCATION DETAILED: PERIUMBILICAL SKIN
LOCATION DETAILED: LEFT MEDIAL BUTTOCK
LOCATION DETAILED: LEFT PROXIMAL POSTERIOR THIGH
LOCATION DETAILED: LEFT POSTERIOR ANKLE
LOCATION DETAILED: RIGHT KNEE
LOCATION DETAILED: LEFT MEDIAL FRONTAL SCALP
LOCATION DETAILED: LEFT INFERIOR LID MARGIN
LOCATION DETAILED: LEFT AXILLARY VAULT
LOCATION DETAILED: RIGHT TRAGUS

## 2023-01-31 NOTE — PROCEDURE: ADDITIONAL NOTES
Render Risk Assessment In Note?: no
Detail Level: Detailed
Additional Notes: Patient complains of joint pain. Recommended patient is evaluated by PM&R to discuss Botox injections.

## 2023-02-02 ENCOUNTER — OFFICE VISIT (OUTPATIENT)
Dept: MEDICAL GROUP | Facility: LAB | Age: 28
End: 2023-02-02
Payer: COMMERCIAL

## 2023-02-02 ENCOUNTER — HOSPITAL ENCOUNTER (OUTPATIENT)
Facility: MEDICAL CENTER | Age: 28
End: 2023-02-02
Attending: FAMILY MEDICINE
Payer: COMMERCIAL

## 2023-02-02 VITALS
SYSTOLIC BLOOD PRESSURE: 116 MMHG | WEIGHT: 132 LBS | RESPIRATION RATE: 14 BRPM | BODY MASS INDEX: 18.9 KG/M2 | OXYGEN SATURATION: 98 % | TEMPERATURE: 97.8 F | HEART RATE: 78 BPM | HEIGHT: 70 IN | DIASTOLIC BLOOD PRESSURE: 70 MMHG

## 2023-02-02 DIAGNOSIS — L70.9 ACNE, UNSPECIFIED ACNE TYPE: ICD-10-CM

## 2023-02-02 DIAGNOSIS — F90.2 ATTENTION DEFICIT HYPERACTIVITY DISORDER (ADHD), COMBINED TYPE: ICD-10-CM

## 2023-02-02 DIAGNOSIS — R68.84 JAW PAIN: ICD-10-CM

## 2023-02-02 DIAGNOSIS — F33.2 SEVERE EPISODE OF RECURRENT MAJOR DEPRESSIVE DISORDER, WITHOUT PSYCHOTIC FEATURES (HCC): ICD-10-CM

## 2023-02-02 PROCEDURE — G0481 DRUG TEST DEF 8-14 CLASSES: HCPCS

## 2023-02-02 PROCEDURE — 99214 OFFICE O/P EST MOD 30 MIN: CPT | Performed by: FAMILY MEDICINE

## 2023-02-02 RX ORDER — DEXTROAMPHETAMINE SACCHARATE, AMPHETAMINE ASPARTATE, DEXTROAMPHETAMINE SULFATE AND AMPHETAMINE SULFATE 5; 5; 5; 5 MG/1; MG/1; MG/1; MG/1
20 TABLET ORAL 2 TIMES DAILY
Qty: 180 EACH | Refills: 0 | Status: SHIPPED | OUTPATIENT
Start: 2023-02-02 | End: 2023-05-03 | Stop reason: SDUPTHER

## 2023-02-02 ASSESSMENT — ENCOUNTER SYMPTOMS
PALPITATIONS: 0
NAUSEA: 0
DIARRHEA: 0
ABDOMINAL PAIN: 0
CONSTIPATION: 0
FEVER: 0
VOMITING: 0
CHILLS: 0
SHORTNESS OF BREATH: 0
WHEEZING: 0

## 2023-02-02 ASSESSMENT — FIBROSIS 4 INDEX: FIB4 SCORE: 0.48

## 2023-02-02 NOTE — PROGRESS NOTES
"Subjective:   St Kelsea Rey is a 27 y.o. female here today for   No chief complaint on file.      #ADHD   -Chronic condition currently treated with Adderall 20 mg twice daily.  Compliant with medication, denies any side effects.  Denies any diversion,, use of recreational or illicit drugs.  Continues to be extremely helpful especially now that she is restarted school and a new semester.  Requesting refill of medication at this time.    #MDD   -On Pristiq milligrams.  Compliant with medication.  She has noticed over the last 2 weeks of medication is not \"working as well\".  States she will have some occasional episodes of increased anxiety or depression.  Noticed an occasional hallucination.  She states the symptoms are mild but are slightly concerning.  -She states recently it has been slightly stressful given that she has recently changed her major and starting on a new course.  Is working on good diet, exercise regiment.  Denies any suicidal/homicidal ideations.    #Jaw pain:  -Patient is notes her last several months of worsening jaw pain, specifically on the left side.  She states that it is painful when opening jaw wide and then closing again.  She states that several years ago she did have traumatic injury to the left jaw, and similar area where she is having pain.  She also feels like she noticed a small click that is developed in the left jaw as well.    #Acne   Having worsening acne over the last several months.  States that acne is located across the lower jaw chin.  Currently on birth control.  Does not note any change of acne while being on birth control.  Is not taking the blanks, is not having any periods.        Allergies   Allergen Reactions    Latex Hives, Rash and Itching     Rash at contact site         Current medicines (including changes today)  Current Outpatient Medications   Medication Sig Dispense Refill    norethindrone-ethinyl estradiol (MICROGESTIN 1/20) 1-20 MG-MCG per tablet " "TAKE 1 TABLET DAILY 84 Tablet 2    desvenlafaxine succinate (PRISTIQ) 50 MG TABLET SR 24 HR Take 1 Tablet by mouth every day for 360 days. 90 Tablet 3    amphetamine-dextroamphetamine (ADDERALL) 20 MG Tab Take 1 Tablet by mouth 2 times a day for 30 days. 60 Each 0    midodrine (PROAMATINE) 2.5 MG Tab Take 1 Tablet by mouth 2 times a day. 60 Tablet 3    fluconazole (DIFLUCAN) 150 MG tablet Take 1 tab po now. Take 2nd tab po in 72 hours. 2 Tablet 1    fluticasone (FLONASE) 50 MCG/ACT nasal spray Administer 1 Spray into affected nostril(S) 1 time a day as needed. 16 g 11    albuterol (PROAIR HFA) 108 (90 Base) MCG/ACT Aero Soln inhalation aerosol Inhale 2 Puffs by mouth every 6 hours as needed for Shortness of Breath. 8.5 g 0     No current facility-administered medications for this visit.     She  has a past medical history of ADHD (attention deficit hyperactivity disorder), Allergy, Anemia, Asthma, Depression, Fear of needles, Gynecological disorder, H/O dizziness, History of fainting, Infectious disease, Low blood phosphate, and QT prolongation (1/2009).    ROS   Review of Systems   Constitutional:  Negative for chills and fever.   Respiratory:  Negative for shortness of breath and wheezing.    Cardiovascular:  Negative for chest pain and palpitations.   Gastrointestinal:  Negative for abdominal pain, constipation, diarrhea, nausea and vomiting.      Objective:     Physical Exam:  /70   Pulse 78   Temp 36.6 °C (97.8 °F) (Temporal)   Resp 14   Ht 1.778 m (5' 10\")   Wt 59.9 kg (132 lb)   SpO2 98%  Body mass index is 18.94 kg/m².   Constitutional: Alert, no distress.  Skin: Warm, dry, good turgor, no rashes in visible areas.  Eye: Equal, round and reactive, conjunctiva clear, lids normal.  Respiratory: Unlabored respiratory effort  Psych: Alert and oriented x3, normal affect and mood.    Assessment and Plan:     1. Attention deficit hyperactivity disorder (ADHD), combined type  FEN stable.  No concerns at " this time.  We will refill Adderall at this time.  -Controlled substance agreement is on file.  -Patient due for drug screen.  -Obtained and reviewed patient utilization report from [facility] [place] [facility] pharmacy database on 02/02/23 to writing prescription for controlled substance II, III or IV per [place] bill . Based on assessment of the report,my physical exam if necessary and the patient's health problem, the prescription is medically necessary.  - amphetamine-dextroamphetamine (ADDERALL) 20 MG Tab; Take 1 Tablet by mouth 2 times a day for 90 days.  Dispense: 180 Each; Refill: 0  - PAIN MANAGEMENT SCRN, UR; Future    2. Severe episode of recurrent major depressive disorder, without psychotic features (HCC)  -At this time we are will continue with current dose of Pristiq continue to monitor symptoms.  These symptoms he is experiencing could be due to recent stressors in her academic life.  I encouraged her to continue with weekly therapy.  I encouraged her to work on healthy being mechanisms and organization techniques to help her through the stressful time.  If symptoms begin to worsen then she will follow-up immediately.  Patient understood and agree with current treatment plan.    3. Jaw pain  Plan uncertain etiology of jaw pain although there is some concerns for TMJ etiology.  I recommend the patient follow-up with dentist/maxillofacial specialist who specializes in TMJ.  In the meantime she can use over-the-counter analgesics as needed.  No red flag symptoms, patient will follow-up as needed.    4. Acne, unspecified acne type  -Discussed appropriate skin care.  Difficult to tell if this is hormonal or not.  She also feels like this could be related to stress that she feels like acne did worsen with increased academic stress.  Again, continue to monitor symptoms and if no improvement then we may want to discuss either changing birth control or adding spironolactone.    Followup: No follow-ups on file.          PLEASE NOTE: This dictation was created using voice recognition software. I have made every reasonable attempt to correct obvious errors, but I expect that there are errors of grammar and possibly content that I did not discover before finalizing the note.

## 2023-02-06 LAB
1OH-MIDAZOLAM UR QL SCN: NOT DETECTED
6MAM UR QL: NOT DETECTED
7AMINOCLONAZEPAM UR QL: NOT DETECTED
A-OH ALPRAZ UR QL: NOT DETECTED
ALPRAZ UR QL: NOT DETECTED
AMPHET UR QL SCN: PRESENT
ANNOTATION COMMENT IMP: NORMAL
ANNOTATION COMMENT IMP: NORMAL
BARBITURATES UR QL: NOT DETECTED
BUPRENORPHINE UR QL: NOT DETECTED
BZE UR QL: PRESENT
CARBOXYTHC UR QL: NOT DETECTED
CARISOPRODOL UR QL: NOT DETECTED
CLONAZEPAM UR QL: NOT DETECTED
CODEINE UR QL: NOT DETECTED
DIAZEPAM UR QL: NOT DETECTED
ETHYL GLUCURONIDE UR QL: PRESENT
FENTANYL UR QL: NOT DETECTED
GABAPENTIN UR QL: NOT DETECTED
HYDROCODONE UR QL: NOT DETECTED
HYDROMORPHONE UR QL: NOT DETECTED
LORAZEPAM UR QL: NOT DETECTED
MDA UR QL: NOT DETECTED
MDEA UR QL: NOT DETECTED
MDMA UR QL: NOT DETECTED
MEPERIDINE UR QL: NOT DETECTED
METHADONE UR QL: NOT DETECTED
METHAMPHET UR QL: NOT DETECTED
MIDAZOLAM UR QL SCN: NOT DETECTED
MORPHINE UR QL: NOT DETECTED
NALOXONE UR QL SCN: NOT DETECTED
NORBUPRENORPHINE UR QL CFM: NOT DETECTED
NORDIAZEPAM UR QL: NOT DETECTED
NORFENTANYL UR QL: NOT DETECTED
NORHYDROCODONE UR QL CFM: NOT DETECTED
NOROXYCODONE UR QL CFM: NOT DETECTED
NOROXYMORPH CO100 Q0458: NOT DETECTED
OXAZEPAM UR QL: NOT DETECTED
OXYCODONE UR QL: NOT DETECTED
OXYMORPHONE UR QL: NOT DETECTED
PATHOLOGY STUDY: NORMAL
PCP UR QL: NOT DETECTED
PHENTERMINE UR QL: NOT DETECTED
PPAA UR QL: NOT DETECTED
PREGABALIN UR QL SCN: NOT DETECTED
SERVICE CMNT-IMP: NORMAL
TAPENADOL OSULF CO200 Q0473: NOT DETECTED
TAPENTADOL UR QL SCN: NOT DETECTED
TEMAZEPAM UR QL: NOT DETECTED
TRAMADOL UR QL: NOT DETECTED
ZOLPIDEM PHENYL-4-CARB UR QL SCN: NOT DETECTED
ZOLPIDEM UR QL: NOT DETECTED

## 2023-02-15 ENCOUNTER — OFFICE VISIT (OUTPATIENT)
Dept: MEDICAL GROUP | Facility: LAB | Age: 28
End: 2023-02-15
Payer: COMMERCIAL

## 2023-02-15 VITALS
OXYGEN SATURATION: 97 % | RESPIRATION RATE: 15 BRPM | TEMPERATURE: 98.1 F | SYSTOLIC BLOOD PRESSURE: 108 MMHG | DIASTOLIC BLOOD PRESSURE: 72 MMHG | BODY MASS INDEX: 18.61 KG/M2 | HEART RATE: 78 BPM | WEIGHT: 130 LBS | HEIGHT: 70 IN

## 2023-02-15 DIAGNOSIS — F90.2 ATTENTION DEFICIT HYPERACTIVITY DISORDER (ADHD), COMBINED TYPE: ICD-10-CM

## 2023-02-15 DIAGNOSIS — F33.2 SEVERE EPISODE OF RECURRENT MAJOR DEPRESSIVE DISORDER, WITHOUT PSYCHOTIC FEATURES (HCC): ICD-10-CM

## 2023-02-15 PROCEDURE — 99215 OFFICE O/P EST HI 40 MIN: CPT | Performed by: FAMILY MEDICINE

## 2023-02-15 RX ORDER — BENZOYL PEROXIDE 100 MG/ML
LIQUID TOPICAL
COMMUNITY
Start: 2023-02-01

## 2023-02-15 RX ORDER — CLINDAMYCIN PHOSPHATE 10 MG/ML
SOLUTION TOPICAL
COMMUNITY
Start: 2023-02-01

## 2023-02-15 RX ORDER — DESVENLAFAXINE SUCCINATE 50 MG/1
50 TABLET, EXTENDED RELEASE ORAL DAILY
Qty: 90 TABLET | Refills: 3 | Status: SHIPPED | OUTPATIENT
Start: 2023-02-15 | End: 2023-07-19 | Stop reason: SDUPTHER

## 2023-02-15 ASSESSMENT — ENCOUNTER SYMPTOMS
CONSTIPATION: 0
WHEEZING: 0
NAUSEA: 0
VOMITING: 0
PALPITATIONS: 0
DIARRHEA: 0
SHORTNESS OF BREATH: 0
CHILLS: 0
ABDOMINAL PAIN: 0
DEPRESSION: 0
FEVER: 0
NERVOUS/ANXIOUS: 0

## 2023-02-15 ASSESSMENT — FIBROSIS 4 INDEX: FIB4 SCORE: 0.48

## 2023-02-15 NOTE — PROGRESS NOTES
Subjective:   St Kelsea Rey is a 27 y.o. female here today for   Chief Complaint   Patient presents with    Results       #ADHD:  -Currently on Adderall 20 mg twice daily.  She is compliant with medication and denies any side effects.  Recent drug screening was positive for cocaine and alcohol.  We are here to discuss these results.  Patient does have history of drug use occluding cocaine.  She states that she has not used cocaine for some time.  She states that the Sunday before completing drug screening she was at a company party where she was drinking very heavily.  She states that she does not remember much of that evening and is concerned that she may have used cocaine at that time.  Otherwise she states that she has been clean for some time and was shocked to see the the results of the drug screen.  These results and potential consequences thereof have caused a lot of stress and anxiety and patient.  She has been working on mindfulness, following with therapist, continue with medication including Pristiq.    Allergies   Allergen Reactions    Latex Hives, Rash and Itching     Rash at contact site         Current medicines (including changes today)  Current Outpatient Medications   Medication Sig Dispense Refill    desvenlafaxine succinate (PRISTIQ) 50 MG TABLET SR 24 HR Take 1 Tablet by mouth every day for 360 days. 90 Tablet 3    norethindrone-ethinyl estradiol (MICROGESTIN 1/20) 1-20 MG-MCG per tablet TAKE 1 TABLET DAILY 84 Tablet 2    BENZOYL PEROXIDE WASH 10 % Liquid       CLINDAMYCIN PHOSPHATE,TOPICAL, 1 % SWAB       amphetamine-dextroamphetamine (ADDERALL) 20 MG Tab Take 1 Tablet by mouth 2 times a day for 90 days. 180 Each 0    amphetamine-dextroamphetamine (ADDERALL) 20 MG Tab Take 1 Tablet by mouth 2 times a day for 30 days. 60 Each 0    midodrine (PROAMATINE) 2.5 MG Tab Take 1 Tablet by mouth 2 times a day. 60 Tablet 3    fluconazole (DIFLUCAN) 150 MG tablet Take 1 tab po now. Take 2nd  "tab po in 72 hours. 2 Tablet 1    fluticasone (FLONASE) 50 MCG/ACT nasal spray Administer 1 Spray into affected nostril(S) 1 time a day as needed. 16 g 11    albuterol (PROAIR HFA) 108 (90 Base) MCG/ACT Aero Soln inhalation aerosol Inhale 2 Puffs by mouth every 6 hours as needed for Shortness of Breath. 8.5 g 0     No current facility-administered medications for this visit.     She  has a past medical history of ADHD (attention deficit hyperactivity disorder), Allergy, Anemia, Asthma, Depression, Fear of needles, Gynecological disorder, H/O dizziness, History of fainting, Infectious disease, Low blood phosphate, and QT prolongation (1/2009).    ROS   Review of Systems   Constitutional:  Negative for chills and fever.   Respiratory:  Negative for shortness of breath and wheezing.    Cardiovascular:  Negative for chest pain and palpitations.   Gastrointestinal:  Negative for abdominal pain, constipation, diarrhea, nausea and vomiting.   Psychiatric/Behavioral:  Negative for depression. The patient is not nervous/anxious.       Objective:     Physical Exam:  /72   Pulse 78   Temp 36.7 °C (98.1 °F) (Temporal)   Resp 15   Ht 1.779 m (5' 10.04\")   Wt 59 kg (130 lb)   SpO2 97%  Body mass index is 18.63 kg/m².   Constitutional: Alert, no distress.  Skin: Warm, dry, good turgor, no rashes in visible areas.  Eye: Equal, round and reactive, conjunctiva clear, lids normal.  Respiratory: Unlabored respiratory effort, lungs clear to auscultation, no wheezes, no rhonchi.  Cardiovascular: Normal S1, S2, no murmur, no edema.  Abdomen: Soft, non-tender, no masses, no hepatosplenomegaly.  Psych: Alert and oriented x3, normal affect and mood.    Assessment and Plan:     1. Attention deficit hyperactivity disorder (ADHD), combined type  -Had long conversation with patient regarding appropriate use of Adderall and the need to remain free of any other recreational illicit drug use due to the potential side effects and abuse " potential of these medications.  Discussed the potential effects of cocaine use.  Patient was very upfront to her previous cocaine use, alcohol use, and other previous drug use.  This is a very sensitive subject for patient as she knows the deleterious effects of these substances.  After long conversation with patient we have decided that we will continue the Adderall under the assumption that she will pass next drug screening within the next few months.  We discussed the importance of refraining from such significant alcohol use where she is not remembering into the evening and the dangers behind that.  I encouraged her to continue with therapist, continue with appropriate diet and exercise regimen, continue with medications.  We will follow-up for medication check in 3 months, random drug screen within the next 6 months.    2. Severe episode of recurrent major depressive disorder, without psychotic features (HCC)  -Stable, no concerns at this time.  Will refill medication for patient.  - desvenlafaxine succinate (PRISTIQ) 50 MG TABLET SR 24 HR; Take 1 Tablet by mouth every day for 360 days.  Dispense: 90 Tablet; Refill: 3    My total time spent caring for the patient on the day of the encounter was 40 minutes.   This does not include time spent on separately billable procedures/tests.      Followup: No follow-ups on file.         PLEASE NOTE: This dictation was created using voice recognition software. I have made every reasonable attempt to correct obvious errors, but I expect that there are errors of grammar and possibly content that I did not discover before finalizing the note.

## 2023-04-24 RX ORDER — NORETHINDRONE ACETATE AND ETHINYL ESTRADIOL .02; 1 MG/1; MG/1
1 TABLET ORAL DAILY
Qty: 84 TABLET | Refills: 2 | Status: SHIPPED
Start: 2023-04-24 | End: 2023-07-19

## 2023-04-24 NOTE — TELEPHONE ENCOUNTER
Received request via: Pharmacy    Was the patient seen in the last year in this department? Yes  2/15/23  Does the patient have an active prescription (recently filled or refills available) for medication(s) requested? No    Does the patient have [facility] Plus and need 100 day supply (blood pressure, diabetes and cholesterol meds only)? Medication is not for cholesterol, blood pressure or diabetes

## 2023-04-25 DIAGNOSIS — B37.9 YEAST INFECTION: ICD-10-CM

## 2023-04-25 NOTE — TELEPHONE ENCOUNTER
Received request via: Patient    Was the patient seen in the last year in this department? Yes 02/15/23    Does the patient have an active prescription (recently filled or refills available) for medication(s) requested? No    Does the patient have [facility] Plus and need 100 day supply (blood pressure, diabetes and cholesterol meds only)? Patient does not have [facility]

## 2023-04-27 RX ORDER — FLUCONAZOLE 150 MG/1
TABLET ORAL
Qty: 2 TABLET | Refills: 1 | Status: SHIPPED | OUTPATIENT
Start: 2023-04-27

## 2023-05-03 ENCOUNTER — OFFICE VISIT (OUTPATIENT)
Dept: MEDICAL GROUP | Facility: LAB | Age: 28
End: 2023-05-03
Payer: COMMERCIAL

## 2023-05-03 VITALS
RESPIRATION RATE: 15 BRPM | WEIGHT: 125 LBS | SYSTOLIC BLOOD PRESSURE: 108 MMHG | HEIGHT: 70 IN | TEMPERATURE: 98.1 F | DIASTOLIC BLOOD PRESSURE: 72 MMHG | HEART RATE: 76 BPM | OXYGEN SATURATION: 100 % | BODY MASS INDEX: 17.9 KG/M2

## 2023-05-03 DIAGNOSIS — F33.2 SEVERE EPISODE OF RECURRENT MAJOR DEPRESSIVE DISORDER, WITHOUT PSYCHOTIC FEATURES (HCC): ICD-10-CM

## 2023-05-03 DIAGNOSIS — Z30.013 ENCOUNTER FOR INITIAL PRESCRIPTION OF INJECTABLE CONTRACEPTIVE: ICD-10-CM

## 2023-05-03 DIAGNOSIS — F90.2 ATTENTION DEFICIT HYPERACTIVITY DISORDER (ADHD), COMBINED TYPE: ICD-10-CM

## 2023-05-03 PROCEDURE — 96372 THER/PROPH/DIAG INJ SC/IM: CPT | Performed by: FAMILY MEDICINE

## 2023-05-03 PROCEDURE — 99214 OFFICE O/P EST MOD 30 MIN: CPT | Mod: 25 | Performed by: FAMILY MEDICINE

## 2023-05-03 RX ORDER — MEDROXYPROGESTERONE ACETATE 150 MG/ML
150 INJECTION, SUSPENSION INTRAMUSCULAR ONCE
Status: COMPLETED | OUTPATIENT
Start: 2023-05-03 | End: 2023-05-03

## 2023-05-03 RX ORDER — DEXTROAMPHETAMINE SACCHARATE, AMPHETAMINE ASPARTATE, DEXTROAMPHETAMINE SULFATE AND AMPHETAMINE SULFATE 5; 5; 5; 5 MG/1; MG/1; MG/1; MG/1
20 TABLET ORAL 2 TIMES DAILY
Qty: 180 EACH | Refills: 0 | Status: SHIPPED | OUTPATIENT
Start: 2023-05-03 | End: 2023-07-19 | Stop reason: SDUPTHER

## 2023-05-03 RX ORDER — ARIPIPRAZOLE 2 MG/1
2 TABLET ORAL DAILY
Qty: 90 TABLET | Refills: 3 | Status: SHIPPED
Start: 2023-05-03 | End: 2023-07-19

## 2023-05-03 RX ADMIN — MEDROXYPROGESTERONE ACETATE 150 MG: 150 INJECTION, SUSPENSION INTRAMUSCULAR at 13:23

## 2023-05-03 ASSESSMENT — PATIENT HEALTH QUESTIONNAIRE - PHQ9
4. FEELING TIRED OR HAVING LITTLE ENERGY: MORE THAN HALF THE DAYS
1. LITTLE INTEREST OR PLEASURE IN DOING THINGS: NEARLY EVERY DAY
7. TROUBLE CONCENTRATING ON THINGS, SUCH AS READING THE NEWSPAPER OR WATCHING TELEVISION: NEARLY EVERY DAY
5. POOR APPETITE OR OVEREATING: SEVERAL DAYS
9. THOUGHTS THAT YOU WOULD BE BETTER OFF DEAD, OR OF HURTING YOURSELF: NOT AT ALL
2. FEELING DOWN, DEPRESSED, IRRITABLE, OR HOPELESS: SEVERAL DAYS
SUM OF ALL RESPONSES TO PHQ QUESTIONS 1-9: 17
SUM OF ALL RESPONSES TO PHQ9 QUESTIONS 1 AND 2: 4
3. TROUBLE FALLING OR STAYING ASLEEP OR SLEEPING TOO MUCH: NEARLY EVERY DAY
8. MOVING OR SPEAKING SO SLOWLY THAT OTHER PEOPLE COULD HAVE NOTICED. OR THE OPPOSITE, BEING SO FIGETY OR RESTLESS THAT YOU HAVE BEEN MOVING AROUND A LOT MORE THAN USUAL: MORE THAN HALF THE DAYS
6. FEELING BAD ABOUT YOURSELF - OR THAT YOU ARE A FAILURE OR HAVE LET YOURSELF OR YOUR FAMILY DOWN: MORE THAN HALF THE DAYS

## 2023-05-03 ASSESSMENT — ENCOUNTER SYMPTOMS
ABDOMINAL PAIN: 0
WHEEZING: 0
DIARRHEA: 0
VOMITING: 0
PALPITATIONS: 0
FEVER: 0
CONSTIPATION: 0
SHORTNESS OF BREATH: 0
CHILLS: 0
NAUSEA: 0

## 2023-05-03 ASSESSMENT — ANXIETY QUESTIONNAIRES
7. FEELING AFRAID AS IF SOMETHING AWFUL MIGHT HAPPEN: NEARLY EVERY DAY
5. BEING SO RESTLESS THAT IT IS HARD TO SIT STILL: SEVERAL DAYS
3. WORRYING TOO MUCH ABOUT DIFFERENT THINGS: MORE THAN HALF THE DAYS
4. TROUBLE RELAXING: MORE THAN HALF THE DAYS
1. FEELING NERVOUS, ANXIOUS, OR ON EDGE: NEARLY EVERY DAY
2. NOT BEING ABLE TO STOP OR CONTROL WORRYING: MORE THAN HALF THE DAYS
GAD7 TOTAL SCORE: 16
6. BECOMING EASILY ANNOYED OR IRRITABLE: NEARLY EVERY DAY

## 2023-05-03 ASSESSMENT — FIBROSIS 4 INDEX: FIB4 SCORE: 0.48

## 2023-05-03 NOTE — PROGRESS NOTES
"Subjective:   St Kelsea Rey is a 27 y.o. female here today for   Chief Complaint   Patient presents with    Follow-Up    ADHD     Follow-up        #ADHD   -Chronic condition currently treated with Adderall 20 mg twice daily.  Compliant with medication, denies any side effects.  Denies any diversion,, use of recreational or illicit drugs.  Continues to be extremely helpful both at school as well as at work. Requesting refill of medication at this time.    #MDD:  -Currently treating with Pristiq 50 mg daily.  At previous appointment in February she noted the medication was \"tapering off\".  Again she feels the same symptoms.  Feels like her depression and anxiety is increasing although still does not have the complete apathy, anhedonia that she had previously.  Have some side effects of insomnia with medication although is taking the Pristiq at night.  Denies any suicidal/homicidal ideations.  Here to discuss other possible treatment measures.    #Contraception management:  -Patient currently on OCPs; however, would like to discuss switching to Depo-Provera for better compliance with medication.  She is also hoping this will help control her periods to a better degree.  She is also feeling very fatigued from taking 70 medications and like to decrease her medication regiment.    Allergies   Allergen Reactions    Latex Hives, Rash and Itching     Rash at contact site         Current medicines (including changes today)  Current Outpatient Medications   Medication Sig Dispense Refill    fluconazole (DIFLUCAN) 150 MG tablet Take 1 tab po now. Take 2nd tab po in 72 hours. 2 Tablet 1    norethindrone-ethinyl estradiol (MICROGESTIN 1/20) 1-20 MG-MCG per tablet Take 1 Tablet by mouth every day. 84 Tablet 2    BENZOYL PEROXIDE WASH 10 % Liquid       CLINDAMYCIN PHOSPHATE,TOPICAL, 1 % SWAB       desvenlafaxine succinate (PRISTIQ) 50 MG TABLET SR 24 HR Take 1 Tablet by mouth every day for 360 days. 90 Tablet 3    " "amphetamine-dextroamphetamine (ADDERALL) 20 MG Tab Take 1 Tablet by mouth 2 times a day for 90 days. 180 Each 0    midodrine (PROAMATINE) 2.5 MG Tab Take 1 Tablet by mouth 2 times a day. 60 Tablet 3    fluticasone (FLONASE) 50 MCG/ACT nasal spray Administer 1 Spray into affected nostril(S) 1 time a day as needed. 16 g 11    albuterol (PROAIR HFA) 108 (90 Base) MCG/ACT Aero Soln inhalation aerosol Inhale 2 Puffs by mouth every 6 hours as needed for Shortness of Breath. 8.5 g 0     No current facility-administered medications for this visit.     She  has a past medical history of ADHD (attention deficit hyperactivity disorder), Allergy, Anemia, Asthma, Depression, Fear of needles, Gynecological disorder, H/O dizziness, History of fainting, Infectious disease, Low blood phosphate, and QT prolongation (1/2009).    ROS   Review of Systems   Constitutional:  Negative for chills and fever.   Respiratory:  Negative for shortness of breath and wheezing.    Cardiovascular:  Negative for chest pain and palpitations.   Gastrointestinal:  Negative for abdominal pain, constipation, diarrhea, nausea and vomiting.      Objective:     Physical Exam:  /72   Pulse 76   Temp 36.7 °C (98.1 °F) (Temporal)   Resp 15   Ht 1.779 m (5' 10.04\")   Wt 56.7 kg (125 lb)   SpO2 100%  Body mass index is 17.92 kg/m².   Constitutional: Alert, no distress.  Skin: Warm, dry, good turgor, no rashes in visible areas.  Eye: Equal, round and reactive, conjunctiva clear, lids normal.  Respiratory: Unlabored respiratory effort  Psych: Alert and oriented x3, normal affect and mood.        1/27/2022     1:30 PM 7/19/2021    10:30 AM 1/8/2018     4:20 PM 11/13/2017     4:20 PM 10/9/2017     4:20 PM   PHQ-9 Screening   Little interest or pleasure in doing things 3 - nearly every day 3 - nearly every day 2 - more than half the days 2 - more than half the days 1 - several days   Feeling down, depressed, or hopeless 2 - more than half the days 2 - more " than half the days 2 - more than half the days 2 - more than half the days 1 - several days   Trouble falling or staying asleep, or sleeping too much 1 - several days 2 - more than half the days 3 - nearly every day 2 - more than half the days 2 - more than half the days   Feeling tired or having little energy 3 - nearly every day 2 - more than half the days 2 - more than half the days 3 - nearly every day 3 - nearly every day   Poor appetite or overeating 1 - several days 2 - more than half the days 3 - nearly every day 3 - nearly every day 2 - more than half the days   Feeling bad about yourself - or that you are a failure or have let yourself or your family down 2 - more than half the days 1 - several days 0 - not at all 1 - several days 0 - not at all   Trouble concentrating on things, such as reading the newspaper or watching television 2 - more than half the days 2 - more than half the days 2 - more than half the days 1 - several days 3 - nearly every day   Moving or speaking so slowly that other people could have noticed. Or the opposite - being so fidgety or restless that you have been moving around a lot more than usual 0 - not at all 0 - not at all 0 - not at all 3 - nearly every day 0 - not at all   Thoughts that you would be better off dead, or of hurting yourself in some way 0 - not at all 0 - not at all 0 - not at all 0 - not at all 0 - not at all   PHQ-2 Total Score 5 5 4 4 2   PHQ-9 Total Score 14 14 14 17 12           5/3/2023    11:39 AM    KIERA-7 ANXIETY SCALE FLOWSHEET   Feeling nervous, anxious, or on edge 3   Not being able to stop or control worrying 2   Worrying too much about different things 2   Trouble relaxing 2   Being so restless that it is hard to sit still 1   Becoming easily annoyed or irritable 3   Feeling afraid as if something awful might happen 3   KIERA-7 Total Score 16       Assessment and Plan:     1. Severe episode of recurrent major depressive disorder, without psychotic  features (HCC)  -PHQ-9, KIERA-7 remain relatively high.  Because of this we discussed a change of medication or augmentation.  At this time we will continue the Pristiq although switch Pristiq to take in the morning to help lateral side effect of insomnia.  We will also add on Abilify to be used concomitantly.  We will check labs to rule out any other secondary causes for worsening symptoms.  Patient will follow-up for medication check in 1 to 3 months.  - ARIPiprazole (ABILIFY) 2 MG tablet; Take 1 Tablet by mouth every day for 360 days.  Dispense: 90 Tablet; Refill: 3  - CBC WITHOUT DIFFERENTIAL; Future  - Comp Metabolic Panel; Future  - TSH WITH REFLEX TO FT4; Future    2. Attention deficit hyperactivity disorder (ADHD), combined type  -Status: Stable with no concerns.  Will refill medication for the next 3 months.  -Controlled substance agreement has been signed.  -Obtained and reviewed patient utilization report from [facility] pharmacy database on 05/03/23 to writing prescription for controlled substance II, III or IV per [place] bill . Based on assessment of the report,my physical exam if necessary and the patient's health problem, the prescription is medically necessary.  - amphetamine-dextroamphetamine (ADDERALL) 20 MG Tab; Take 1 Tablet by mouth 2 times a day for 90 days.  Dispense: 180 Each; Refill: 0  - CBC WITHOUT DIFFERENTIAL; Future  - Comp Metabolic Panel; Future  - TSH WITH REFLEX TO FT4; Future    3. Encounter for initial prescription of injectable contraceptive  -After discussion options of birth control we will continue with Depo-Provera.  No pregnancy test is needed as patient is currently on birth control.  We will transition to Depo-Provera, patient will follow-up every 3 months for reinjection.  - medroxyPROGESTERone (DEPO-PROVERA) injection 150 mg      Followup: No follow-ups on file.         PLEASE NOTE: This dictation was created using voice recognition software. I have made every  reasonable attempt to correct obvious errors, but I expect that there are errors of grammar and possibly content that I did not discover before finalizing the note.

## 2023-07-19 ENCOUNTER — OFFICE VISIT (OUTPATIENT)
Dept: MEDICAL GROUP | Facility: LAB | Age: 28
End: 2023-07-19
Payer: COMMERCIAL

## 2023-07-19 VITALS
HEIGHT: 70 IN | SYSTOLIC BLOOD PRESSURE: 106 MMHG | OXYGEN SATURATION: 99 % | DIASTOLIC BLOOD PRESSURE: 70 MMHG | BODY MASS INDEX: 17.61 KG/M2 | HEART RATE: 104 BPM | RESPIRATION RATE: 16 BRPM | WEIGHT: 123 LBS | TEMPERATURE: 98.2 F

## 2023-07-19 DIAGNOSIS — R10.13 EPIGASTRIC PAIN: ICD-10-CM

## 2023-07-19 DIAGNOSIS — F33.2 SEVERE EPISODE OF RECURRENT MAJOR DEPRESSIVE DISORDER, WITHOUT PSYCHOTIC FEATURES (HCC): ICD-10-CM

## 2023-07-19 DIAGNOSIS — Z30.42 ENCOUNTER FOR SURVEILLANCE OF INJECTABLE CONTRACEPTIVE: ICD-10-CM

## 2023-07-19 DIAGNOSIS — F90.2 ATTENTION DEFICIT HYPERACTIVITY DISORDER (ADHD), COMBINED TYPE: ICD-10-CM

## 2023-07-19 PROCEDURE — 3078F DIAST BP <80 MM HG: CPT | Performed by: FAMILY MEDICINE

## 2023-07-19 PROCEDURE — 3074F SYST BP LT 130 MM HG: CPT | Performed by: FAMILY MEDICINE

## 2023-07-19 PROCEDURE — 99214 OFFICE O/P EST MOD 30 MIN: CPT | Performed by: FAMILY MEDICINE

## 2023-07-19 RX ORDER — DEXTROAMPHETAMINE SACCHARATE, AMPHETAMINE ASPARTATE, DEXTROAMPHETAMINE SULFATE AND AMPHETAMINE SULFATE 5; 5; 5; 5 MG/1; MG/1; MG/1; MG/1
20 TABLET ORAL 2 TIMES DAILY
Qty: 180 EACH | Refills: 0 | Status: SHIPPED | OUTPATIENT
Start: 2023-08-04 | End: 2023-09-28 | Stop reason: SDUPTHER

## 2023-07-19 RX ORDER — MEDROXYPROGESTERONE ACETATE 150 MG/ML
150 INJECTION, SUSPENSION INTRAMUSCULAR ONCE
Status: COMPLETED | OUTPATIENT
Start: 2023-07-19 | End: 2023-07-19

## 2023-07-19 RX ORDER — DESVENLAFAXINE SUCCINATE 50 MG/1
50 TABLET, EXTENDED RELEASE ORAL DAILY
Qty: 90 TABLET | Refills: 3 | Status: SHIPPED | OUTPATIENT
Start: 2023-07-19 | End: 2024-07-13

## 2023-07-19 RX ADMIN — MEDROXYPROGESTERONE ACETATE 150 MG: 150 INJECTION, SUSPENSION INTRAMUSCULAR at 11:43

## 2023-07-19 ASSESSMENT — FIBROSIS 4 INDEX: FIB4 SCORE: 0.48

## 2023-07-19 NOTE — PROGRESS NOTES
Subjective:   St Kelsea Rey is a 27 y.o. female here today for   No chief complaint on file.    #ADHD:  -Chronic longstanding condition currently treating with Adderall 20 mg twice daily.  Compliant medication, denies any side effects.  Continues to be extremely helpful with focus, concentration.  Denies any drug diversion, concomitance of recreational licit drugs.  Requesting refill at this time.    #MDD  -Chronic long standing condition that has been treatment resistant.   -Has been on ability, pristiq. Recently d/c Abilify due to side effects. Patient states that depression has been present but manageable.  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  No suicidal/homicidal ideations.  Feeling well and wishes to continue with just Pristiq at this time.  Will continue follow-up with counseling.    #Contraceptive counseling:  -Recently started Depo-Medrol injection.  Patient denies any significant side effects with exception of slight increase in acne.  Is happy with its use and requesting repeat Depo-Medrol injection at this time.    #Epigastric pain:  -Over the last several months patient's been experiencing sharp, intermittent epigastric pain.  She has had several episodes of vomiting associate with epigastric pain.  Was seen in urgent care/emergency room.  Was treated for gastritis versus gastric ulcer and started on omeprazole.  Patient continues omeprazole which has been helping but has been given her slight headache.  She denies any more fevers, chills, nausea, vomiting.  Continues to have sharp epigastric pain.  No change in stools, no blood in stool, black dark or tarry stools.      Allergies   Allergen Reactions    Latex Hives, Rash and Itching     Rash at contact site         Current medicines (including changes today)  Current Outpatient Medications   Medication Sig Dispense Refill    ARIPiprazole (ABILIFY) 2 MG tablet Take 1 Tablet by mouth every day  "for 360 days. 90 Tablet 3    amphetamine-dextroamphetamine (ADDERALL) 20 MG Tab Take 1 Tablet by mouth 2 times a day for 90 days. 180 Each 0    fluconazole (DIFLUCAN) 150 MG tablet Take 1 tab po now. Take 2nd tab po in 72 hours. 2 Tablet 1    norethindrone-ethinyl estradiol (MICROGESTIN 1/20) 1-20 MG-MCG per tablet Take 1 Tablet by mouth every day. 84 Tablet 2    BENZOYL PEROXIDE WASH 10 % Liquid       CLINDAMYCIN PHOSPHATE,TOPICAL, 1 % SWAB       desvenlafaxine succinate (PRISTIQ) 50 MG TABLET SR 24 HR Take 1 Tablet by mouth every day for 360 days. 90 Tablet 3    midodrine (PROAMATINE) 2.5 MG Tab Take 1 Tablet by mouth 2 times a day. 60 Tablet 3    fluticasone (FLONASE) 50 MCG/ACT nasal spray Administer 1 Spray into affected nostril(S) 1 time a day as needed. 16 g 11    albuterol (PROAIR HFA) 108 (90 Base) MCG/ACT Aero Soln inhalation aerosol Inhale 2 Puffs by mouth every 6 hours as needed for Shortness of Breath. 8.5 g 0     No current facility-administered medications for this visit.     She  has a past medical history of ADHD (attention deficit hyperactivity disorder), Allergy, Anemia, Asthma, Depression, Fear of needles, Gynecological disorder, H/O dizziness, History of fainting, Infectious disease, Low blood phosphate, and QT prolongation (1/2009).    ROS   -See HPI     Objective:     Physical Exam:  /70   Pulse (!) 104   Temp 36.8 °C (98.2 °F) (Temporal)   Resp 16   Ht 1.779 m (5' 10.04\")   Wt 55.8 kg (123 lb)   SpO2 99%  Body mass index is 17.63 kg/m².   Constitutional: Alert, no distress.  Skin: Warm, dry, good turgor, no rashes in visible areas.  Eye: Equal, round and reactive, conjunctiva clear, lids normal.  ENMT: TM's clear bilaterally, lips without lesions, good dentition, oropharynx clear.  Neck: Trachea midline, no masses, no thyromegaly. No cervical or supraclavicular lymphadenopathy.  Respiratory: Unlabored respiratory effort, lungs clear to auscultation, no wheezes, no " rhonchi.  Cardiovascular: Normal S1, S2, no murmur, no edema.  Abdomen: Soft  no masses, no hepatosplenomegaly.  Tenderness palpation epigastric region.  No rebound pain, no guarding.  No peritoneal signs.  Psych: Alert and oriented x3, normal affect and mood.    Assessment and Plan:     1. Attention deficit hyperactivity disorder (ADHD), combined type  -Status: Stable, no concerns.  We will refill medication for the next 90 days.  Patient will follow-up in 3 months for refills, sooner if needed.  -Obtained and reviewed patient utilization report from [facility] pharmacy database on 07/19/23 to writing prescription for controlled substance II, III or IV per [place] bill . Based on assessment of the report,my physical exam if necessary and the patient's health problem, the prescription is medically necessary.  - amphetamine-dextroamphetamine (ADDERALL) 20 MG Tab; Take 1 Tablet by mouth 2 times a day for 90 days.  Dispense: 180 Each; Refill: 0    2. Severe episode of recurrent major depressive disorder, without psychotic features (HCC)  -Status: Stable.  Patient doing well on Pristiq alone.  Encourage patient to continue follow-up with counseling.  We will follow-up with any questions or concerns.  - desvenlafaxine succinate (PRISTIQ) 50 MG TABLET SR 24 HR; Take 1 Tablet by mouth every day for 360 days.  Dispense: 90 Tablet; Refill: 3    3. Encounter for surveillance of injectable contraceptive  - medroxyPROGESTERone (Depo-Provera) injection 150 mg    4. Epigastric pain  -Uncertain etiology of epigastric pain.  Could be concerning for possible gastritis versus ulcer.  Discussed continuation of PPI.  Discussed appropriate diet.  No red flag symptoms, return precautions given.      Followup: No follow-ups on file.         PLEASE NOTE: This dictation was created using voice recognition software. I have made every reasonable attempt to correct obvious errors, but I expect that there are errors of grammar and  possibly content that I did not discover before finalizing the note.

## 2023-10-11 ENCOUNTER — OFFICE VISIT (OUTPATIENT)
Dept: MEDICAL GROUP | Facility: LAB | Age: 28
End: 2023-10-11
Payer: COMMERCIAL

## 2023-10-11 VITALS
HEIGHT: 70 IN | BODY MASS INDEX: 17.9 KG/M2 | RESPIRATION RATE: 16 BRPM | OXYGEN SATURATION: 98 % | SYSTOLIC BLOOD PRESSURE: 114 MMHG | DIASTOLIC BLOOD PRESSURE: 72 MMHG | TEMPERATURE: 97.6 F | HEART RATE: 100 BPM | WEIGHT: 125 LBS

## 2023-10-11 DIAGNOSIS — Z30.42 ENCOUNTER FOR SURVEILLANCE OF INJECTABLE CONTRACEPTIVE: ICD-10-CM

## 2023-10-11 DIAGNOSIS — R19.04 LEFT LOWER QUADRANT ABDOMINAL MASS: ICD-10-CM

## 2023-10-11 DIAGNOSIS — F90.2 ATTENTION DEFICIT HYPERACTIVITY DISORDER (ADHD), COMBINED TYPE: ICD-10-CM

## 2023-10-11 PROCEDURE — 99214 OFFICE O/P EST MOD 30 MIN: CPT | Mod: 25 | Performed by: FAMILY MEDICINE

## 2023-10-11 PROCEDURE — 96372 THER/PROPH/DIAG INJ SC/IM: CPT | Performed by: FAMILY MEDICINE

## 2023-10-11 PROCEDURE — 3074F SYST BP LT 130 MM HG: CPT | Performed by: FAMILY MEDICINE

## 2023-10-11 PROCEDURE — 3078F DIAST BP <80 MM HG: CPT | Performed by: FAMILY MEDICINE

## 2023-10-11 RX ORDER — MEDROXYPROGESTERONE ACETATE 150 MG/ML
150 INJECTION, SUSPENSION INTRAMUSCULAR ONCE
Status: COMPLETED | OUTPATIENT
Start: 2023-10-11 | End: 2023-10-11

## 2023-10-11 RX ORDER — DEXTROAMPHETAMINE SACCHARATE, AMPHETAMINE ASPARTATE, DEXTROAMPHETAMINE SULFATE AND AMPHETAMINE SULFATE 5; 5; 5; 5 MG/1; MG/1; MG/1; MG/1
20 TABLET ORAL 2 TIMES DAILY
Qty: 60 EACH | Refills: 0 | Status: SHIPPED | OUTPATIENT
Start: 2024-01-01 | End: 2024-01-31

## 2023-10-11 RX ORDER — DEXTROAMPHETAMINE SACCHARATE, AMPHETAMINE ASPARTATE, DEXTROAMPHETAMINE SULFATE AND AMPHETAMINE SULFATE 5; 5; 5; 5 MG/1; MG/1; MG/1; MG/1
20 TABLET ORAL 2 TIMES DAILY
Qty: 60 EACH | Refills: 0 | Status: SHIPPED | OUTPATIENT
Start: 2023-12-02 | End: 2024-01-01

## 2023-10-11 RX ORDER — DEXTROAMPHETAMINE SACCHARATE, AMPHETAMINE ASPARTATE, DEXTROAMPHETAMINE SULFATE AND AMPHETAMINE SULFATE 5; 5; 5; 5 MG/1; MG/1; MG/1; MG/1
20 TABLET ORAL 2 TIMES DAILY
Qty: 60 EACH | Refills: 0 | Status: SHIPPED | OUTPATIENT
Start: 2023-11-02 | End: 2023-12-02

## 2023-10-11 RX ADMIN — MEDROXYPROGESTERONE ACETATE 150 MG: 150 INJECTION, SUSPENSION INTRAMUSCULAR at 11:47

## 2023-10-11 ASSESSMENT — FIBROSIS 4 INDEX: FIB4 SCORE: 0.48

## 2023-10-11 NOTE — PROGRESS NOTES
Subjective:   St Kelsea Rey is a 27 y.o. female here today for   No chief complaint on file.    #ADHD:  -Chronic longstanding condition currently treating with Adderall 20 mg twice daily.  Compliant medication, denies any side effects.  Continues to be extremely helpful with focus, concentration.  Denies any drug diversion, concomitance of recreational licit drugs.  Requesting refill at this time.    #Contraceptive counseling:  -Recently started Depo-Medrol injection.  Patient denies any significant side effects with exception of slight increase in acne.  Is happy with its use and requesting repeat Depo-Medrol injection at this time.    #Abdominal pain/abdominal mass:  -Over the last 3 months patient's been noticing increasing abdominal size and potential mass on left lower quadrant.  She states is also become dull, aching, and tender to touch with ongoing constipation.  She denies any nausea, vomiting, fevers, chills, night sweats, anticipated weight loss.  -Has significant history of benign tumor removal in the similar area that occurred in 2018.       Allergies   Allergen Reactions    Latex Hives, Rash and Itching     Rash at contact site         Current medicines (including changes today)  Current Outpatient Medications   Medication Sig Dispense Refill    amphetamine-dextroamphetamine (ADDERALL) 20 MG Tab Take 1 Tablet by mouth 2 times a day for 30 days. 60 Each 0    desvenlafaxine succinate (PRISTIQ) 50 MG TABLET SR 24 HR Take 1 Tablet by mouth every day for 360 days. 90 Tablet 3    fluconazole (DIFLUCAN) 150 MG tablet Take 1 tab po now. Take 2nd tab po in 72 hours. 2 Tablet 1    BENZOYL PEROXIDE WASH 10 % Liquid       CLINDAMYCIN PHOSPHATE,TOPICAL, 1 % SWAB       midodrine (PROAMATINE) 2.5 MG Tab Take 1 Tablet by mouth 2 times a day. 60 Tablet 3    fluticasone (FLONASE) 50 MCG/ACT nasal spray Administer 1 Spray into affected nostril(S) 1 time a day as needed. 16 g 11    albuterol (PROAIR HFA) 108  "(90 Base) MCG/ACT Aero Soln inhalation aerosol Inhale 2 Puffs by mouth every 6 hours as needed for Shortness of Breath. 8.5 g 0     No current facility-administered medications for this visit.     She  has a past medical history of ADHD (attention deficit hyperactivity disorder), Allergy, Anemia, Asthma, Depression, Fear of needles, Gynecological disorder, H/O dizziness, History of fainting, Infectious disease, Low blood phosphate, and QT prolongation (1/2009).    ROS   -See HPI       Objective:     Physical Exam:  /72   Pulse 100   Temp 36.4 °C (97.6 °F) (Temporal)   Resp 16   Ht 1.779 m (5' 10.04\")   Wt 56.7 kg (125 lb)   SpO2 98%  Body mass index is 17.92 kg/m².   Constitutional: Alert, no distress.  Skin: Warm, dry, good turgor, no rashes in visible areas.  Eye: Equal, round and reactive, conjunctiva clear, lids normal.  ENMT: TM's clear bilaterally, lips without lesions, good dentition, oropharynx clear.  Neck: Trachea midline, no masses, no thyromegaly. No cervical or supraclavicular lymphadenopathy.  Respiratory: Unlabored respiratory effort, lungs clear to auscultation, no wheezes, no rhonchi.  Cardiovascular: Normal S1, S2, no murmur, no edema.  Abdomen: Significant fullness noted in the abdomen especially in left lower quadrant.  Tenderness to palpation.  No rebound pain, no guarding.  Bowel sounds normal.  No hepatosplenomegaly.  Psych: Alert and oriented x3, normal affect and mood.    Assessment and Plan:     1. Encounter for surveillance of injectable contraceptive  - medroxyPROGESTERone (Depo-Provera) injection 150 mg    2. Attention deficit hyperactivity disorder (ADHD), combined type  -Status: Stable.  Refill Adderall at this time.  Controlled subs agreement is on file.  Urine drug screen needed at this time.  -Obtained and reviewed patient utilization report from [facility] pharmacy database on 10/11/2023 to writing prescription for controlled substance II, III or IV per [place] bill " . Based on assessment of the report,my physical exam if necessary and the patient's health problem, the prescription is medically necessary.  - amphetamine-dextroamphetamine (ADDERALL) 20 MG Tab; Take 1 Tablet by mouth 2 times a day for 30 days.  Dispense: 60 Each; Refill: 0  - amphetamine-dextroamphetamine (ADDERALL) 20 MG Tab; Take 1 Tablet by mouth 2 times a day for 30 days.  Dispense: 60 Each; Refill: 0  - amphetamine-dextroamphetamine (ADDERALL) 20 MG Tab; Take 1 Tablet by mouth 2 times a day for 30 days.  Dispense: 60 Each; Refill: 0  - URINE DRUG SCREEN; Future    3. Left lower quadrant abdominal mass  -Given prior history of benign mass and new symptoms further evaluation is needed we will order CT of the abdomen at this time.  We will follow-up with patient with results.  - CT-ABDOMEN-PELVIS WITH & W/O; Future      Followup: No follow-ups on file.         PLEASE NOTE: This dictation was created using voice recognition software. I have made every reasonable attempt to correct obvious errors, but I expect that there are errors of grammar and possibly content that I did not discover before finalizing the note.

## 2024-01-03 ENCOUNTER — OFFICE VISIT (OUTPATIENT)
Dept: MEDICAL GROUP | Facility: LAB | Age: 29
End: 2024-01-03
Payer: COMMERCIAL

## 2024-01-03 VITALS
WEIGHT: 125 LBS | DIASTOLIC BLOOD PRESSURE: 60 MMHG | TEMPERATURE: 98.3 F | HEART RATE: 79 BPM | OXYGEN SATURATION: 99 % | HEIGHT: 70 IN | BODY MASS INDEX: 17.9 KG/M2 | SYSTOLIC BLOOD PRESSURE: 84 MMHG | RESPIRATION RATE: 16 BRPM

## 2024-01-03 DIAGNOSIS — F90.2 ATTENTION DEFICIT HYPERACTIVITY DISORDER (ADHD), COMBINED TYPE: ICD-10-CM

## 2024-01-03 DIAGNOSIS — Z30.42 ENCOUNTER FOR SURVEILLANCE OF INJECTABLE CONTRACEPTIVE: ICD-10-CM

## 2024-01-03 DIAGNOSIS — G43.809 OTHER MIGRAINE WITHOUT STATUS MIGRAINOSUS, NOT INTRACTABLE: ICD-10-CM

## 2024-01-03 DIAGNOSIS — F33.2 SEVERE EPISODE OF RECURRENT MAJOR DEPRESSIVE DISORDER, WITHOUT PSYCHOTIC FEATURES (HCC): ICD-10-CM

## 2024-01-03 DIAGNOSIS — Z23 NEED FOR VACCINATION: ICD-10-CM

## 2024-01-03 PROCEDURE — 90471 IMMUNIZATION ADMIN: CPT | Performed by: FAMILY MEDICINE

## 2024-01-03 PROCEDURE — 3074F SYST BP LT 130 MM HG: CPT | Performed by: FAMILY MEDICINE

## 2024-01-03 PROCEDURE — 90686 IIV4 VACC NO PRSV 0.5 ML IM: CPT | Performed by: FAMILY MEDICINE

## 2024-01-03 PROCEDURE — 99214 OFFICE O/P EST MOD 30 MIN: CPT | Mod: 25 | Performed by: FAMILY MEDICINE

## 2024-01-03 PROCEDURE — 3078F DIAST BP <80 MM HG: CPT | Performed by: FAMILY MEDICINE

## 2024-01-03 PROCEDURE — 96372 THER/PROPH/DIAG INJ SC/IM: CPT | Performed by: FAMILY MEDICINE

## 2024-01-03 RX ORDER — ONDANSETRON 4 MG/1
4 TABLET, FILM COATED ORAL EVERY 4 HOURS PRN
COMMUNITY

## 2024-01-03 RX ORDER — MEDROXYPROGESTERONE ACETATE 150 MG/ML
150 INJECTION, SUSPENSION INTRAMUSCULAR ONCE
Status: COMPLETED | OUTPATIENT
Start: 2024-01-03 | End: 2024-01-03

## 2024-01-03 RX ORDER — SUMATRIPTAN 50 MG/1
50 TABLET, FILM COATED ORAL
COMMUNITY

## 2024-01-03 RX ADMIN — MEDROXYPROGESTERONE ACETATE 150 MG: 150 INJECTION, SUSPENSION INTRAMUSCULAR at 11:28

## 2024-01-03 ASSESSMENT — ENCOUNTER SYMPTOMS
NAUSEA: 0
CONSTIPATION: 0
ABDOMINAL PAIN: 0
VOMITING: 0
HEADACHES: 1
DIARRHEA: 0

## 2024-01-03 ASSESSMENT — LIFESTYLE VARIABLES: SUBSTANCE_ABUSE: 0

## 2024-01-03 ASSESSMENT — FIBROSIS 4 INDEX: FIB4 SCORE: 0.5

## 2024-01-03 NOTE — PROGRESS NOTES
Subjective:   St Kelsea Rey is a 28 y.o. female here today for   Chief Complaint   Patient presents with    Medication Refill    Injections     # ADHD:  -Chronic condition currently treating with Adderall 20 mg twice daily.  Continues to be somewhat helpful but continues to have difficulty with focus and concentration.  Part of this is due to concomitant history of depression.  Denies any drug diversion, concomitance of recreational licit drugs.    # Depression:  -Currently on Pristiq 50 mg daily.  She states that symptoms are relatively well-controlled but continues to have through symptoms.  She has noticed especially in the winter months difficulty with concentration, lack of motivation to the point where she has difficulty getting out of bed in the morning.  Patient denies any suicidal/homicidal ideations.    #migraine:  -Being followed by neurology with Dr. Sheppard.  Recently started treating with sumatriptan, Zofran which has been helping.    # Health maintenance:  -Patient due for Depo injection.  -Due for flu shot.    Allergies   Allergen Reactions    Latex Hives, Rash and Itching     Rash at contact site         Current medicines (including changes today)  Current Outpatient Medications   Medication Sig Dispense Refill    amphetamine-dextroamphetamine (ADDERALL) 20 MG Tab Take 1 Tablet by mouth 2 times a day for 30 days. 60 Each 0    desvenlafaxine succinate (PRISTIQ) 50 MG TABLET SR 24 HR Take 1 Tablet by mouth every day for 360 days. 90 Tablet 3    fluconazole (DIFLUCAN) 150 MG tablet Take 1 tab po now. Take 2nd tab po in 72 hours. 2 Tablet 1    BENZOYL PEROXIDE WASH 10 % Liquid       CLINDAMYCIN PHOSPHATE,TOPICAL, 1 % SWAB       midodrine (PROAMATINE) 2.5 MG Tab Take 1 Tablet by mouth 2 times a day. 60 Tablet 3    fluticasone (FLONASE) 50 MCG/ACT nasal spray Administer 1 Spray into affected nostril(S) 1 time a day as needed. 16 g 11    albuterol (PROAIR HFA) 108 (90 Base) MCG/ACT Aero Soln  "inhalation aerosol Inhale 2 Puffs by mouth every 6 hours as needed for Shortness of Breath. 8.5 g 0     No current facility-administered medications for this visit.     She  has a past medical history of ADHD (attention deficit hyperactivity disorder), Allergy, Anemia, Asthma, Depression, Fear of needles, Gynecological disorder, H/O dizziness, History of fainting, Infectious disease, Low blood phosphate, and QT prolongation (1/2009).    ROS   Review of Systems   Gastrointestinal:  Negative for abdominal pain, constipation, diarrhea, nausea and vomiting.   Neurological:  Positive for headaches.   Psychiatric/Behavioral:  Negative for substance abuse and suicidal ideas.       Objective:     Physical Exam:  BP (!) 84/60   Pulse 79   Temp 36.8 °C (98.3 °F) (Temporal)   Resp 16   Ht 1.779 m (5' 10.04\")   Wt 56.7 kg (125 lb)   SpO2 99%  Body mass index is 17.92 kg/m².   Constitutional: Alert, no distress, well-groomed.  Skin: No rashes in visible areas.  Eye: Round. Conjunctiva clear, lids normal. No icterus.   ENMT: Lips pink without lesions, good dentition, moist mucous membranes. Phonation normal.  Neck: No masses, no thyromegaly. Moves freely without pain.  Respiratory: Unlabored respiratory effort, no cough or audible wheeze  Psych: Alert and oriented x3, normal affect and mood.  Assessment and Plan:     1. Attention deficit hyperactivity disorder (ADHD), combined type  -Status: Unstable.  Will continue with Adderall at this time.  Will place referral to psychiatry given ongoing symptoms that are controlled despite appropriate treatment.  We will check labs rule out any other secondary cause.  We will complete urine drug screen as patient is due.  - Referral to Psychiatry  - TSH; Future  - Comp Metabolic Panel; Future  - URINE DRUG SCREEN; Future    2. Other migraine without status migrainosus, not intractable  -Patient will continue with the sumatriptan, Zofran.  Will continue follow-up with neurology.    3. " Severe episode of recurrent major depressive disorder, without psychotic features (HCC)  -Chronic condition, unstable.  Given ongoing concerns especially with anhedonia despite previous attempts of treatment with several different medications we refer to psychiatry for further evaluation with specialty.  - Referral to Psychiatry  - TSH; Future  - Comp Metabolic Panel; Future    4. Encounter for surveillance of injectable contraceptive  - medroxyPROGESTERone (Depo-Provera) injection 150 mg    5. Need for vaccination  - INFLUENZA VACCINE QUAD INJ (PF)      Followup: No follow-ups on file.         PLEASE NOTE: This dictation was created using voice recognition software. I have made every reasonable attempt to correct obvious errors, but I expect that there are errors of grammar and possibly content that I did not discover before finalizing the note.

## 2024-01-03 NOTE — LETTER
[facility]  Andrés Mcdonald M.D.  [address]  [place] [address]  Fax: [phone]   Authorization for Release/Disclosure of   Protected Health Information   Name: ST MANPREET BENTLEY : 1995 SSN: [id number]   Address: [address] [place] [address]  [place] [address] Phone:    [phone] (home)    I authorize the entity listed below to release/disclose the PHI below to:   [facility]/Andrés Mcdonald M.D. and Andrés Mcdonald M.D.   Provider or Entity Name: Barbara BOONE      Address   City, [facility], [facility]   [phone]    [phone]   Reason for request: continuity of care   Information to be released:    [  ] LAST COLONOSCOPY,  including any PATH REPORT and follow-up  [  ] LAST FIT/COLOGUARD RESULT [  ] LAST DEXA  [  ] LAST MAMMOGRAM  [  ] LAST PAP  [  ] LAST LABS [  ] RETINA EXAM REPORT  [  ] IMMUNIZATION RECORDS  [ XXX ] Release all info      [  ] Check here and initial the line next to each item to release ALL health information INCLUDING  _____ Care and treatment for drug and / or alcohol abuse  _____ HIV testing, infection status, or AIDS  _____ Genetic Testing    DATES OF SERVICE OR TIME PERIOD TO BE DISCLOSED: _____________  I understand and acknowledge that:  * This Authorization may be revoked at any time by you in writing, except if your health information has already been used or disclosed.  * Your health information that will be used or disclosed as a result of you signing this authorization could be re-disclosed by the recipient. If this occurs, your re-disclosed health information may no longer be protected by State or Federal laws.  * You may refuse to sign this Authorization. Your refusal will not affect your ability to obtain treatment.  * This Authorization becomes effective upon signing and will  on (date) __________.      If no date is indicated, this Authorization will  one (1) year from the signature date.     Name: Meghana Brettfelix Rey  Signature: Date:   1/3/2024     PLEASE FAX REQUESTED RECORDS BACK TO: [phone]

## 2024-03-21 ENCOUNTER — OFFICE VISIT (OUTPATIENT)
Dept: MEDICAL GROUP | Facility: LAB | Age: 29
End: 2024-03-21
Payer: COMMERCIAL

## 2024-03-21 VITALS
BODY MASS INDEX: 17.32 KG/M2 | TEMPERATURE: 96.9 F | HEART RATE: 80 BPM | SYSTOLIC BLOOD PRESSURE: 90 MMHG | WEIGHT: 121 LBS | HEIGHT: 70 IN | RESPIRATION RATE: 16 BRPM | OXYGEN SATURATION: 98 % | DIASTOLIC BLOOD PRESSURE: 58 MMHG

## 2024-03-21 DIAGNOSIS — F33.2 SEVERE EPISODE OF RECURRENT MAJOR DEPRESSIVE DISORDER, WITHOUT PSYCHOTIC FEATURES (HCC): ICD-10-CM

## 2024-03-21 DIAGNOSIS — Z30.42 ENCOUNTER FOR SURVEILLANCE OF INJECTABLE CONTRACEPTIVE: ICD-10-CM

## 2024-03-21 DIAGNOSIS — Z91.09 ENVIRONMENTAL ALLERGIES: ICD-10-CM

## 2024-03-21 DIAGNOSIS — F90.2 ATTENTION DEFICIT HYPERACTIVITY DISORDER (ADHD), COMBINED TYPE: ICD-10-CM

## 2024-03-21 PROCEDURE — 3074F SYST BP LT 130 MM HG: CPT | Performed by: FAMILY MEDICINE

## 2024-03-21 PROCEDURE — 3078F DIAST BP <80 MM HG: CPT | Performed by: FAMILY MEDICINE

## 2024-03-21 PROCEDURE — 96372 THER/PROPH/DIAG INJ SC/IM: CPT | Performed by: FAMILY MEDICINE

## 2024-03-21 PROCEDURE — 99214 OFFICE O/P EST MOD 30 MIN: CPT | Mod: 25 | Performed by: FAMILY MEDICINE

## 2024-03-21 RX ORDER — MEDROXYPROGESTERONE ACETATE 150 MG/ML
150 INJECTION, SUSPENSION INTRAMUSCULAR ONCE
Status: COMPLETED | OUTPATIENT
Start: 2024-03-21 | End: 2024-03-21

## 2024-03-21 RX ORDER — DEXTROAMPHETAMINE SACCHARATE, AMPHETAMINE ASPARTATE, DEXTROAMPHETAMINE SULFATE AND AMPHETAMINE SULFATE 5; 5; 5; 5 MG/1; MG/1; MG/1; MG/1
20 TABLET ORAL 2 TIMES DAILY
Qty: 60 EACH | Refills: 0 | Status: SHIPPED | OUTPATIENT
Start: 2024-04-20 | End: 2024-05-20

## 2024-03-21 RX ORDER — DEXTROAMPHETAMINE SACCHARATE, AMPHETAMINE ASPARTATE, DEXTROAMPHETAMINE SULFATE AND AMPHETAMINE SULFATE 5; 5; 5; 5 MG/1; MG/1; MG/1; MG/1
20 TABLET ORAL 2 TIMES DAILY
Qty: 60 EACH | Refills: 0 | Status: SHIPPED | OUTPATIENT
Start: 2024-05-20 | End: 2024-06-19

## 2024-03-21 RX ORDER — DEXTROAMPHETAMINE SACCHARATE, AMPHETAMINE ASPARTATE, DEXTROAMPHETAMINE SULFATE AND AMPHETAMINE SULFATE 5; 5; 5; 5 MG/1; MG/1; MG/1; MG/1
20 TABLET ORAL 2 TIMES DAILY
Qty: 60 EACH | Refills: 0 | Status: SHIPPED | OUTPATIENT
Start: 2024-03-21 | End: 2024-04-20

## 2024-03-21 RX ADMIN — MEDROXYPROGESTERONE ACETATE 150 MG: 150 INJECTION, SUSPENSION INTRAMUSCULAR at 12:02

## 2024-03-21 ASSESSMENT — PATIENT HEALTH QUESTIONNAIRE - PHQ9: CLINICAL INTERPRETATION OF PHQ2 SCORE: 0

## 2024-03-21 NOTE — PROGRESS NOTES
Verbal consent was acquired by the patient to use [employer] ambient listening note generation during this visit Yes    Subjective:   St Kelsea Rey is a 28 y.o. female here today for   Chief Complaint   Patient presents with    Medication Refill    Injections     Depo shot      History of Present Illness  The patient is here for medication refill and Depo injection.      Patient has history of MDD and ADHD.  Her Adderall and SNRI are due for refill. She was not able to  the venlafaxine due to insurance difficulty and the medication being prohibitively expensive.  She has been off the desvenlafaxine for over 1 month.  The withdrawal was way easier than last time on the straight venlafaxine. She also got the flu at the same time and just slept through it. She feels a little more lethargic. After the first week, she started to get a lot of social anxiety coming back. She was feeling really overwhelmed by OCD thoughts about univers and people existing, but that has greatly faded. She finds motivation a little bit hard and is a little more avoidant than she thinks. She has a psychiatry appointment set up early next month. She thinks she was getting some benefit from the desvenlafaxine, but she thinks it has plateaued a little bit. She would be a lot more interested in something that she has it for her as a supplement. She started taking magnesium before bed since getting off the desvenlafaxine, which helps a lot. She wakes up with energy and feels like she actually slept. She could not tell if it was the withdrawal or the magnesium, but she had very loose stools every time she urinated. She has been taking it every other day now and that has been better.  Patient denies any suicidal/homicidal ideations    Patient doing well as far as treatment for ADHD.  Continues with Adderall daily.  Denies any significant side effects.  Denies any drug diversion, concomitance of recreational illicit drugs.   Requesting refill of medication at this time.    Her gynecologist thinks she has Hashimoto's based on her symptomology. She has ordered a pelvic ultrasound and a thyroid ultrasound. She feels the lump is gone.   Her mother has a history of Hashimoto's.      Allergies   Allergen Reactions    Latex Hives, Rash and Itching     Rash at contact site         Current medicines (including changes today)  Current Outpatient Medications   Medication Sig Dispense Refill    amphetamine-dextroamphetamine (ADDERALL) 20 MG Tab Take 1 Tablet by mouth 2 times a day for 30 days. 60 Each 0    [START ON 4/20/2024] amphetamine-dextroamphetamine (ADDERALL) 20 MG Tab Take 1 Tablet by mouth 2 times a day for 30 days. 60 Each 0    [START ON 5/20/2024] amphetamine-dextroamphetamine (ADDERALL) 20 MG Tab Take 1 Tablet by mouth 2 times a day for 30 days. 60 Each 0    SUMAtriptan (IMITREX) 50 MG Tab Take 50 mg by mouth one time as needed for Migraine.      ondansetron (ZOFRAN) 4 MG Tab tablet Take 4 mg by mouth every four hours as needed for Nausea/Vomiting.      desvenlafaxine succinate (PRISTIQ) 50 MG TABLET SR 24 HR Take 1 Tablet by mouth every day for 360 days. 90 Tablet 3    fluconazole (DIFLUCAN) 150 MG tablet Take 1 tab po now. Take 2nd tab po in 72 hours. 2 Tablet 1    BENZOYL PEROXIDE WASH 10 % Liquid       CLINDAMYCIN PHOSPHATE,TOPICAL, 1 % SWAB       midodrine (PROAMATINE) 2.5 MG Tab Take 1 Tablet by mouth 2 times a day. 60 Tablet 3    fluticasone (FLONASE) 50 MCG/ACT nasal spray Administer 1 Spray into affected nostril(S) 1 time a day as needed. 16 g 11    albuterol (PROAIR HFA) 108 (90 Base) MCG/ACT Aero Soln inhalation aerosol Inhale 2 Puffs by mouth every 6 hours as needed for Shortness of Breath. 8.5 g 0     Current Facility-Administered Medications   Medication Dose Route Frequency Provider Last Rate Last Admin    medroxyPROGESTERone (Depo-Provera) injection 150 mg  150 mg Intramuscular Once Andrés Mcdonald M.D.      "    She  has a past medical history of ADHD (attention deficit hyperactivity disorder), Allergy, Anemia, Asthma, Depression, Fear of needles, Gynecological disorder, H/O dizziness, History of fainting, Infectious disease, Low blood phosphate, and QT prolongation (1/2009).    ROS   ROS  -See HPI     Objective:     Physical Exam:  BP 90/58   Pulse 80   Temp 36.1 °C (96.9 °F) (Temporal)   Resp 16   Ht 1.779 m (5' 10.04\")   Wt 54.9 kg (121 lb)   SpO2 98%  Body mass index is 17.34 kg/m².   Constitutional: Alert, no distress, well-groomed.  Skin: No rashes in visible areas.  Eye: Round. Conjunctiva clear, lids normal. No icterus.   ENMT: Lips pink without lesions, good dentition, moist mucous membranes. Phonation normal.  Neck: No masses, no thyromegaly. Moves freely without pain.  Respiratory: Unlabored respiratory effort, no cough or audible wheeze  Psych: Alert and oriented x3, normal affect and mood.    Results      Assessment and Plan:     Assessment & Plan  1. Depression and anxiety.  We will hold off on restarting desvenlafaxine. She will continue with other supplements including valerian root. She will follow up with the psychiatrist.    2. ADHD.  We will hold off on restarting Pristiq. She will continue Adderall.  Discussed appropriate of medication.  Patient will follow-up for medication refill in 3 months.  Obtained and reviewed patient utilization report from [facility] pharmacy database on 03/21/24 to writing prescription for controlled substance II, III or IV per [place] bill . Based on assessment of the report,my physical exam if necessary and the patient's health problem, the prescription is medically necessary.    3.  Encounter for surveillance of injectable contraceptive.  She will receive her Depo injection today.     4.  Seasonal allergies.  She was informed that it is going to last for a while. She will start taking Allegra daily. She will use saline spray in between. She can use Rhinocort " instead of Flonase.    5. Health maintenance.  I will print out the labs for her.    Follow-up  The patient will follow up in 3 months.    Orders:  1. Severe episode of recurrent major depressive disorder, without psychotic features (HCC)    2. Encounter for surveillance of injectable contraceptive  - medroxyPROGESTERone (Depo-Provera) injection 150 mg    3. Attention deficit hyperactivity disorder (ADHD), combined type  - amphetamine-dextroamphetamine (ADDERALL) 20 MG Tab; Take 1 Tablet by mouth 2 times a day for 30 days.  Dispense: 60 Each; Refill: 0  - amphetamine-dextroamphetamine (ADDERALL) 20 MG Tab; Take 1 Tablet by mouth 2 times a day for 30 days.  Dispense: 60 Each; Refill: 0  - amphetamine-dextroamphetamine (ADDERALL) 20 MG Tab; Take 1 Tablet by mouth 2 times a day for 30 days.  Dispense: 60 Each; Refill: 0    4. Environmental allergies          Followup: Return in about 3 months (around 6/21/2024).         PLEASE NOTE: This dictation was created using voice recognition and [employer] ambient listening software. I have made every reasonable attempt to correct obvious errors, but I expect that there are errors of grammar and possibly content that I did not discover before finalizing the note.

## 2024-06-13 ENCOUNTER — OFFICE VISIT (OUTPATIENT)
Dept: MEDICAL GROUP | Facility: LAB | Age: 29
End: 2024-06-13
Payer: COMMERCIAL

## 2024-06-13 VITALS
HEART RATE: 93 BPM | RESPIRATION RATE: 12 BRPM | BODY MASS INDEX: 17.18 KG/M2 | OXYGEN SATURATION: 99 % | SYSTOLIC BLOOD PRESSURE: 94 MMHG | TEMPERATURE: 98.1 F | WEIGHT: 120 LBS | DIASTOLIC BLOOD PRESSURE: 60 MMHG | HEIGHT: 70 IN

## 2024-06-13 DIAGNOSIS — Z30.42 ENCOUNTER FOR SURVEILLANCE OF INJECTABLE CONTRACEPTIVE: ICD-10-CM

## 2024-06-13 DIAGNOSIS — F90.2 ATTENTION DEFICIT HYPERACTIVITY DISORDER (ADHD), COMBINED TYPE: ICD-10-CM

## 2024-06-13 DIAGNOSIS — R63.0 DECREASED APPETITE: ICD-10-CM

## 2024-06-13 DIAGNOSIS — J01.10 ACUTE NON-RECURRENT FRONTAL SINUSITIS: ICD-10-CM

## 2024-06-13 PROCEDURE — 3074F SYST BP LT 130 MM HG: CPT | Performed by: FAMILY MEDICINE

## 2024-06-13 PROCEDURE — 96372 THER/PROPH/DIAG INJ SC/IM: CPT | Performed by: FAMILY MEDICINE

## 2024-06-13 PROCEDURE — 99214 OFFICE O/P EST MOD 30 MIN: CPT | Mod: 25 | Performed by: FAMILY MEDICINE

## 2024-06-13 PROCEDURE — 3078F DIAST BP <80 MM HG: CPT | Performed by: FAMILY MEDICINE

## 2024-06-13 RX ORDER — DEXTROAMPHETAMINE SACCHARATE, AMPHETAMINE ASPARTATE, DEXTROAMPHETAMINE SULFATE AND AMPHETAMINE SULFATE 5; 5; 5; 5 MG/1; MG/1; MG/1; MG/1
20 TABLET ORAL 2 TIMES DAILY
Qty: 60 EACH | Refills: 0 | Status: SHIPPED | OUTPATIENT
Start: 2024-08-12 | End: 2024-09-11

## 2024-06-13 RX ORDER — DEXTROAMPHETAMINE SACCHARATE, AMPHETAMINE ASPARTATE, DEXTROAMPHETAMINE SULFATE AND AMPHETAMINE SULFATE 5; 5; 5; 5 MG/1; MG/1; MG/1; MG/1
20 TABLET ORAL 2 TIMES DAILY
Qty: 60 EACH | Refills: 0 | Status: SHIPPED | OUTPATIENT
Start: 2024-06-13 | End: 2024-07-13

## 2024-06-13 RX ORDER — AMOXICILLIN AND CLAVULANATE POTASSIUM 875; 125 MG/1; MG/1
1 TABLET, FILM COATED ORAL 2 TIMES DAILY
Qty: 14 TABLET | Refills: 0 | Status: SHIPPED | OUTPATIENT
Start: 2024-06-13 | End: 2024-06-20

## 2024-06-13 RX ORDER — DEXTROAMPHETAMINE SACCHARATE, AMPHETAMINE ASPARTATE, DEXTROAMPHETAMINE SULFATE AND AMPHETAMINE SULFATE 5; 5; 5; 5 MG/1; MG/1; MG/1; MG/1
20 TABLET ORAL 2 TIMES DAILY
Qty: 60 EACH | Refills: 0 | Status: SHIPPED | OUTPATIENT
Start: 2024-07-13 | End: 2024-08-12

## 2024-06-13 RX ORDER — MEDROXYPROGESTERONE ACETATE 150 MG/ML
150 INJECTION, SUSPENSION INTRAMUSCULAR ONCE
Status: COMPLETED | OUTPATIENT
Start: 2024-06-13 | End: 2024-06-13

## 2024-06-13 RX ADMIN — MEDROXYPROGESTERONE ACETATE 150 MG: 150 INJECTION, SUSPENSION INTRAMUSCULAR at 12:45

## 2024-06-13 ASSESSMENT — ANXIETY QUESTIONNAIRES
1. FEELING NERVOUS, ANXIOUS, OR ON EDGE: SEVERAL DAYS
4. TROUBLE RELAXING: SEVERAL DAYS
3. WORRYING TOO MUCH ABOUT DIFFERENT THINGS: NEARLY EVERY DAY
GAD7 TOTAL SCORE: 14
2. NOT BEING ABLE TO STOP OR CONTROL WORRYING: NEARLY EVERY DAY
6. BECOMING EASILY ANNOYED OR IRRITABLE: MORE THAN HALF THE DAYS
7. FEELING AFRAID AS IF SOMETHING AWFUL MIGHT HAPPEN: MORE THAN HALF THE DAYS
5. BEING SO RESTLESS THAT IT IS HARD TO SIT STILL: MORE THAN HALF THE DAYS

## 2024-06-13 ASSESSMENT — PATIENT HEALTH QUESTIONNAIRE - PHQ9
CLINICAL INTERPRETATION OF PHQ2 SCORE: 1
5. POOR APPETITE OR OVEREATING: 3 - NEARLY EVERY DAY
SUM OF ALL RESPONSES TO PHQ QUESTIONS 1-9: 15

## 2024-06-13 NOTE — PROGRESS NOTES
Verbal consent was acquired by the patient to use [employer] ambient listening note generation during this visit Yes    Subjective:   St Kelsea Rey is a 28 y.o. female here today for   No chief complaint on file.    History of Present Illness  The patient is a 28-year-old female with history of ADHD, here today for medication refill. Currently on Adderall 20 mg twice a day.    The patient reports a general sense of well-being since discontinuing Pristiq.  While she states he is feeling well continues to have some symptoms of depression and anxiety. She had an appointment with a psychiatrist, which was cancelled due to unavailability, but has since rescheduled it for Friday. She denies any suicidal ideation or thoughts of self-harm or harm to others. However, she reports poor sleep quality, which she attributes to her recent illness.     She has been ill for approximately 2 months, characterized by a bacterial infection, severe sinus and cough, and temporomandibular joint disorder (TMJ) pain, tooth pain, and ear pain. Initially, she experienced chest pain, shortness of breath, or difficulty breathing, but tested negative for COVID-19 during her illness. She also experienced an ocular migraine 2 days ago.     She requires a refill of Adderall, which she reports as effective. However, she experiences difficulty sleeping if she misses a dose from Monday to Wednesday. Her first dose of Adderall is taken at 9:00 or 10:00 AM, and the second dose is not later than 4:00 PM.     She reports hormonal changes when the Depo-Provera begins to wear off, which resolve quickly. She denies PMS or PMDD. She reports a sensation of fullness in her ears, necessitating frequent ear popping.     She reports a decreased appetite, which she attributes to her sinus infection, changes in medications, and possibly worsening of depression or anxiety. This has been ongoing for at least 6 months, but has recently worsened. She has  "increased her physical activity, including Pilates and hiking, and is on a protein and carbohydrate-heavy diet.      Allergies   Allergen Reactions    Latex Hives, Rash and Itching     Rash at contact site         Current medicines (including changes today)  Current Outpatient Medications   Medication Sig Dispense Refill    amphetamine-dextroamphetamine (ADDERALL) 20 MG Tab Take 1 Tablet by mouth 2 times a day for 30 days. 60 Each 0    SUMAtriptan (IMITREX) 50 MG Tab Take 50 mg by mouth one time as needed for Migraine.      ondansetron (ZOFRAN) 4 MG Tab tablet Take 4 mg by mouth every four hours as needed for Nausea/Vomiting.      desvenlafaxine succinate (PRISTIQ) 50 MG TABLET SR 24 HR Take 1 Tablet by mouth every day for 360 days. 90 Tablet 3    fluconazole (DIFLUCAN) 150 MG tablet Take 1 tab po now. Take 2nd tab po in 72 hours. 2 Tablet 1    BENZOYL PEROXIDE WASH 10 % Liquid       CLINDAMYCIN PHOSPHATE,TOPICAL, 1 % SWAB       midodrine (PROAMATINE) 2.5 MG Tab Take 1 Tablet by mouth 2 times a day. 60 Tablet 3    fluticasone (FLONASE) 50 MCG/ACT nasal spray Administer 1 Spray into affected nostril(S) 1 time a day as needed. 16 g 11    albuterol (PROAIR HFA) 108 (90 Base) MCG/ACT Aero Soln inhalation aerosol Inhale 2 Puffs by mouth every 6 hours as needed for Shortness of Breath. 8.5 g 0     No current facility-administered medications for this visit.     She  has a past medical history of ADHD (attention deficit hyperactivity disorder), Allergy, Anemia, Asthma, Depression, Fear of needles, Gynecological disorder, H/O dizziness, History of fainting, Infectious disease, Low blood phosphate, and QT prolongation (1/2009).    ROS   ROS  -See HPI     Objective:     Physical Exam:  BP 94/60   Pulse 93   Temp 36.7 °C (98.1 °F) (Temporal)   Resp 12   Ht 1.779 m (5' 10.04\")   Wt 54.4 kg (120 lb)   SpO2 99%  Body mass index is 17.2 kg/m².   Constitutional: Alert, no distress.  Skin: Warm, dry, good turgor, no rashes in " visible areas.  Eye: Equal, round and reactive, conjunctiva clear, lids normal.  ENMT: Significant traction of TMs, right greater than left.  Air-fluid interface noted on right TM, lips without lesions, good dentition, oropharynx erythematous with cobblestoning pattern.  Tenderness to palpation across the maxillary and frontal sinuses.  Neck: Trachea midline, no masses, no thyromegaly.  Lymphadenopathy noted anterior cervical chain bilaterally   Respiratory: Unlabored respiratory effort, lungs clear to auscultation, no wheezes, no rhonchi.  Cardiovascular: Normal S1, S2, no murmur, no edema.  Psych: Alert and oriented x3, normal affect and mood.    Results      Assessment and Plan:     Assessment & Plan  1. Acute non-recurrent frontal sinusitis  -Signs and symptoms are consistent with potential bacterial sinusitis given symptoms of tenderness to the sinuses, teeth pain, ongoing congestion.  Will begin treatment with Augmentin, discussed appropriate, potential side effects.  Patient will follow-up if needed.  - amoxicillin-clavulanate (AUGMENTIN) 875-125 MG Tab; Take 1 Tablet by mouth 2 times a day for 7 days.  Dispense: 14 Tablet; Refill: 0    2. Attention deficit hyperactivity disorder (ADHD), combined type  -Chronic condition, stable with no concerns.  Will continue with the Adderall.  Patient did express that there are times in the afternoon she takes only 10 mg.  Discussed appropriately splitting medication if needed.  Discussed continuation of appropriate medication holidays.  Patient is due for urine drug screen which she will complete in the next few months.  Patient will follow-up for medication check in 3 months.  Obtained and reviewed patient utilization report from [facility] pharmacy database on 06/13/24 to writing prescription for controlled substance II, III or IV per [place] bill . Based on assessment of the report,my physical exam if necessary and the patient's health problem, the prescription  is medically necessary.  - amphetamine-dextroamphetamine (ADDERALL) 20 MG Tab; Take 1 Tablet by mouth 2 times a day for 30 days.  Dispense: 60 Each; Refill: 0  - amphetamine-dextroamphetamine (ADDERALL) 20 MG Tab; Take 1 Tablet by mouth 2 times a day for 30 days.  Dispense: 60 Each; Refill: 0  - amphetamine-dextroamphetamine (ADDERALL) 20 MG Tab; Take 1 Tablet by mouth 2 times a day for 30 days.  Dispense: 60 Each; Refill: 0    3. Decreased appetite  -This is probably multifactorial problem with etiologies due to iatrogenic effects from Adderall versus history of depression and anxiety versus recent significant sinusitis.  Discussed the patient appropriate diet, focusing on high-protein meals.  Reviewed patient's weight, it does appear that she has had a 10 pound weight loss over the last 12 months.  Discussed the importance of avoiding any further weight loss.  Patient will follow-up with any other questions or concerns.    4. Encounter for surveillance of injectable contraceptive  - medroxyPROGESTERone (Depo-Provera) injection 150 mg      Orders:  There are no diagnoses linked to this encounter.      Followup: No follow-ups on file.         PLEASE NOTE: This dictation was created using voice recognition and [employer] ambient listening software. I have made every reasonable attempt to correct obvious errors, but I expect that there are errors of grammar and possibly content that I did not discover before finalizing the note.

## 2024-06-17 ENCOUNTER — PATIENT MESSAGE (OUTPATIENT)
Dept: MEDICAL GROUP | Facility: LAB | Age: 29
End: 2024-06-17
Payer: COMMERCIAL

## 2024-06-18 ENCOUNTER — NURSE TRIAGE (OUTPATIENT)
Dept: HEALTH INFORMATION MANAGEMENT | Facility: OTHER | Age: 29
End: 2024-06-18
Payer: COMMERCIAL

## 2024-08-29 ENCOUNTER — APPOINTMENT (OUTPATIENT)
Dept: MEDICAL GROUP | Facility: LAB | Age: 29
End: 2024-08-29
Payer: COMMERCIAL

## 2024-08-29 ENCOUNTER — NON-PROVIDER VISIT (OUTPATIENT)
Dept: MEDICAL GROUP | Facility: LAB | Age: 29
End: 2024-08-29
Payer: COMMERCIAL

## 2024-08-29 DIAGNOSIS — Z30.42 ENCOUNTER FOR SURVEILLANCE OF INJECTABLE CONTRACEPTIVE: ICD-10-CM

## 2024-08-29 RX ORDER — MEDROXYPROGESTERONE ACETATE 150 MG/ML
150 INJECTION, SUSPENSION INTRAMUSCULAR ONCE
Status: COMPLETED | OUTPATIENT
Start: 2024-08-29 | End: 2024-08-29

## 2024-08-29 RX ADMIN — MEDROXYPROGESTERONE ACETATE 150 MG: 150 INJECTION, SUSPENSION INTRAMUSCULAR at 17:46

## 2024-08-30 NOTE — PROGRESS NOTES
St Kelsea Rey is a 28 y.o. female here for a non-provider visit for Depo  injection.    Reason for injection: Contraception   Order in MAR?: Yes  Patient supplied?:No  Minimum interval has been met for this injection (per MAR order): Yes    Patient tolerated injection and no adverse effects were observed or reported: Yes    # of Administrations remaining in MAR:

## 2024-11-15 ENCOUNTER — OFFICE VISIT (OUTPATIENT)
Dept: MEDICAL GROUP | Facility: LAB | Age: 29
End: 2024-11-15
Payer: COMMERCIAL

## 2024-11-15 VITALS
SYSTOLIC BLOOD PRESSURE: 104 MMHG | OXYGEN SATURATION: 98 % | BODY MASS INDEX: 17.5 KG/M2 | HEIGHT: 71 IN | WEIGHT: 125 LBS | TEMPERATURE: 97.9 F | HEART RATE: 97 BPM | DIASTOLIC BLOOD PRESSURE: 62 MMHG | RESPIRATION RATE: 16 BRPM

## 2024-11-15 DIAGNOSIS — J32.1 CHRONIC FRONTAL SINUSITIS: ICD-10-CM

## 2024-11-15 DIAGNOSIS — H49.01 RIGHT OCULOMOTOR NERVE PALSY: ICD-10-CM

## 2024-11-15 DIAGNOSIS — H53.2 DOUBLE VISION: ICD-10-CM

## 2024-11-15 DIAGNOSIS — Z00.00 ANNUAL PHYSICAL EXAM: ICD-10-CM

## 2024-11-15 DIAGNOSIS — R53.1 LEFT-SIDED WEAKNESS: ICD-10-CM

## 2024-11-15 PROCEDURE — 3074F SYST BP LT 130 MM HG: CPT | Performed by: FAMILY MEDICINE

## 2024-11-15 PROCEDURE — 99214 OFFICE O/P EST MOD 30 MIN: CPT | Mod: 25 | Performed by: FAMILY MEDICINE

## 2024-11-15 PROCEDURE — 99395 PREV VISIT EST AGE 18-39: CPT | Performed by: FAMILY MEDICINE

## 2024-11-15 PROCEDURE — 3078F DIAST BP <80 MM HG: CPT | Performed by: FAMILY MEDICINE

## 2024-11-15 RX ORDER — NORETHINDRONE ACETATE AND ETHINYL ESTRADIOL .02; 1 MG/1; MG/1
1 TABLET ORAL DAILY
Qty: 84 TABLET | Refills: 3 | Status: SHIPPED | OUTPATIENT
Start: 2024-11-15

## 2024-11-15 RX ORDER — AZITHROMYCIN 250 MG/1
TABLET, FILM COATED ORAL
Qty: 6 TABLET | Refills: 0 | Status: SHIPPED | OUTPATIENT
Start: 2024-11-15 | End: 2024-11-20

## 2024-11-15 NOTE — PROGRESS NOTES
Verbal consent was acquired by the patient to use PT PAL ambient listening note generation during this visit Yes    Subjective:   St Kelsea Rey is a 28 y.o. female here today for   Chief Complaint   Patient presents with    Annual Exam     History of Present Illness  The patient is a 28-year-old female here today for an annual physical exam.    She has a history of migraines, currently managed with abortive therapy including sumatriptan.    She also has a history of orthostatic hypotension and dizziness, managed with midodrine as needed.    On 07/16/2024, she experienced double vision upon waking, which persisted until 09/20/2024. 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 The double vision worsened at night but improved over the course of three days. She occasionally experiences double vision when extremely tired, which can be alleviated by napping or wearing glasses. She sought treatment in the ER, where she received a migraine cocktail twice. An MRI and CT scan without contrast were performed, but no abnormalities were found. She was advised to follow up with an ophthalmologist. A retina specialist found no issues with her eyes, but noted that her left eye was overcompensating. She was diagnosed with third cranial nerve palsy and advised to have an MRI with contrast and blood work. However, due to insurance issues, she did not have the MRI. She noticed that alcohol exacerbated her double vision, but it would resolve once the alcohol was metabolized. She has been feeling lethargic and more susceptible to illness since the double vision episode. She had a cold that lasted for three weeks, while her friends recovered in three days. She experienced weakness in her left arm and less temperature sensation on the left side. She had eye pain when overextending her eyes, which has since resolved. She had a sinus infection that has not fully resolved and  caused severe sinus or ocular migraines, particularly on the left side. She was advised to wear an eye patch by a neurologist, but found that wearing it for too long caused eye strain. She had difficulty swallowing and noticed that one eyelid was lower than the other. She also experienced decreased sensation in her feet and ankles, and felt as though her hips were tilting. She developed a hand tremor, more pronounced on the right side.    She is currently on Depo-Medrol for birth control and wishes to discuss changing her birth control method due to painful acne caused by Depo-Medrol. She is interested in returning to the pill and is considering an IUD, despite concerns about increased bleeding. She has an upcoming appointment with her OB/GYN and is considering transitioning from Depo-Medrol to an IUD within the next three months.    She has a persistent sinus infection and is considering delaying her flu shot until it resolves. She is unable to take Z-José Antonio and has run out of Flonase, but has been using saline. She continues to use Ranulfo.    She sees her psychiatrist monthly. She is still on Adderall and that is working out well. He also has prescribed an antianxiety medication as needed.    SOCIAL HISTORY  She drinks alcohol. She denies smoking. She denies any drug use.      Allergies   Allergen Reactions    Latex Hives, Rash and Itching     Rash at contact site         Current medicines (including changes today)  Current Outpatient Medications   Medication Sig Dispense Refill    SUMAtriptan (IMITREX) 50 MG Tab Take 50 mg by mouth one time as needed for Migraine.      ondansetron (ZOFRAN) 4 MG Tab tablet Take 4 mg by mouth every four hours as needed for Nausea/Vomiting.      fluconazole (DIFLUCAN) 150 MG tablet Take 1 tab po now. Take 2nd tab po in 72 hours. 2 Tablet 1    BENZOYL PEROXIDE WASH 10 % Liquid       CLINDAMYCIN PHOSPHATE,TOPICAL, 1 % SWAB       midodrine (PROAMATINE) 2.5 MG Tab Take 1 Tablet by mouth 2  "times a day. 60 Tablet 3    fluticasone (FLONASE) 50 MCG/ACT nasal spray Administer 1 Spray into affected nostril(S) 1 time a day as needed. 16 g 11    albuterol (PROAIR HFA) 108 (90 Base) MCG/ACT Aero Soln inhalation aerosol Inhale 2 Puffs by mouth every 6 hours as needed for Shortness of Breath. 8.5 g 0     No current facility-administered medications for this visit.     She  has a past medical history of ADHD (attention deficit hyperactivity disorder), Allergy, Anemia, Asthma, Depression, Fear of needles, Gynecological disorder, H/O dizziness, History of fainting, Infectious disease, Low blood phosphate, and QT prolongation (1/2009).    ROS   ROS  -See HPI     Objective:     Physical Exam:  /62   Pulse 97   Temp 36.6 °C (97.9 °F) (Temporal)   Resp 16   Ht 1.799 m (5' 10.83\")   Wt 56.7 kg (125 lb)   SpO2 98%  Body mass index is 17.52 kg/m².   Constitutional: Alert, no distress.  Skin: Warm, dry, good turgor, no rashes in visible areas.  Eye: Equal, round and reactive, conjunctiva clear, lids normal.  ENMT: TM's clear bilaterally, lips without lesions, good dentition, oropharynx clear.  Neck: Trachea midline, no masses, no thyromegaly. No cervical or supraclavicular lymphadenopathy.  Respiratory: Unlabored respiratory effort, lungs clear to auscultation, no wheezes, no rhonchi.  Cardiovascular: Normal S1, S2, no murmur, no edema.  Abdomen: Soft, non-tender, no masses, no hepatosplenomegaly.  Psych: Alert and oriented x3, normal affect and mood.    Results  Imaging  MRI and CT without contrast showed no abnormalities.    Assessment and Plan:     Assessment & Plan  1. Third cranial nerve palsy.  The patient reported experiencing double vision from July 16 to September 20, which resolved spontaneously. She occasionally experiences double vision when tired or after consuming alcohol. She also noted left-sided weakness and decreased sensation. A neuro-ophthalmologist diagnosed her with third cranial nerve " palsy. Myasthenia gravis is suspected, and blood work will be ordered to check for receptor binding antibody and ESR. An MRI with contrast will be scheduled to rule out other potential causes, including MS. She is advised to inform if symptoms recur so that a referral to a specialist can be arranged.    2. Birth control.  She is currently on Depo-Medrol for birth control and experiencing painful acne. She is advised to discuss switching to an IUD with her OB/GYN. In the meantime, she will be prescribed birth control pills.    3. Sinusitis.  She has been experiencing persistent sinusitis and was previously treated for a sinus infection. A prescription for Z-José Antonio will be provided. She is also advised to use Flonase, administering two sprays in each nostril daily for a month. The influenza vaccine will be deferred until her sinusitis resolves.    4.Annual   -All questions concerns were answered at this time.  -Vaccinations/screenings needed at this time: Patient declines flu shot at this time while we work on resolving sinusitis as above.  Due for Pap smear, will follow-up with her OB/GYN next month.  -Labs reviewed, no concerns will check labs as below.  -Discussed the importance of a healthy, Mediterranean style diet, routine exercise regimen.  -Discussed general safety measures including seatbelts, helmets, avoidance of smoking, sunscreen, hydration.  -Follow-up for general physical exam on a yearly basis, sooner if needed.      Follow-up  Return in a couple of months for follow-up.    Orders:  1. Annual physical exam    2. Double vision  - [facility] BLOCKING AB; Future  - [facility] BINDING ABS, SERUM  - Aquaporin-4 Receptor IgG, Serum, rflx Titer; Future  - MR-BRAIN-WITH & W/O; Future    3. Left-sided weakness  - [facility] BLOCKING AB; Future  - [facility] BINDING ABS, SERUM  - Aquaporin-4 Receptor IgG, Serum, rflx Titer; Future  - MR-BRAIN-WITH & W/O; Future    4. Right oculomotor nerve palsy  - [facility] BLOCKING AB; Future  - [facility] BINDING  ABS, SERUM  - Aquaporin-4 Receptor IgG, Serum, rflx Titer; Future  - MR-BRAIN-WITH & W/O; Future    5. Chronic frontal sinusitis  - azithromycin (ZITHROMAX) 250 MG Tab; Take 2 Tablets by mouth every day for 1 day, THEN 1 Tablet every day for 4 days.  Dispense: 6 Tablet; Refill: 0    Other orders  - norethindrone-ethinyl estradiol (JUNEL 1/20) 1-20 MG-MCG per tablet; Take 1 Tablet by mouth every day.  Dispense: 84 Tablet; Refill: 3        Followup: No follow-ups on file.         PLEASE NOTE: This dictation was created using voice recognition and [employer] ambient listening software. I have made every reasonable attempt to correct obvious errors, but I expect that there are errors of grammar and possibly content that I did not discover before finalizing the note.

## 2024-11-21 ENCOUNTER — HOSPITAL ENCOUNTER (OUTPATIENT)
Dept: RADIOLOGY | Facility: MEDICAL CENTER | Age: 29
End: 2024-11-21
Attending: FAMILY MEDICINE
Payer: COMMERCIAL

## 2024-11-21 DIAGNOSIS — R53.1 LEFT-SIDED WEAKNESS: ICD-10-CM

## 2024-11-21 DIAGNOSIS — H53.2 DOUBLE VISION: ICD-10-CM

## 2024-11-21 DIAGNOSIS — H49.01 RIGHT OCULOMOTOR NERVE PALSY: ICD-10-CM

## 2024-11-21 PROCEDURE — 700117 HCHG RX CONTRAST REV CODE 255: Mod: JZ | Performed by: FAMILY MEDICINE

## 2024-11-21 PROCEDURE — 70553 MRI BRAIN STEM W/O & W/DYE: CPT

## 2024-11-21 PROCEDURE — A9579 GAD-BASE MR CONTRAST NOS,1ML: HCPCS | Mod: JZ | Performed by: FAMILY MEDICINE

## 2024-11-21 RX ADMIN — GADOTERIDOL 10 ML: 279.3 INJECTION, SOLUTION INTRAVENOUS at 11:18

## (undated) DEVICE — SUTURE 3-0 VICRYL PLUS SH - 27 INCH (36/BX)

## (undated) DEVICE — SLEEVE, VASO, THIGH, MED

## (undated) DEVICE — LACTATED RINGERS INJ 1000 ML - (14EA/CA 60CA/PF)

## (undated) DEVICE — CATHETER FOLEY SIL 18FR 5CC - (10/BX) 2-WAY

## (undated) DEVICE — SUCTION INSTRUMENT YANKAUER BULBOUS TIP W/O VENT (50EA/CA)

## (undated) DEVICE — GLOVE SZ 6.5 BIOGEL PI MICRO - PF LF (50PR/BX)

## (undated) DEVICE — JELLY SURGILUBE STERILE TUBE 4.25 OZ (1/EA)

## (undated) DEVICE — SUTURE 3-0 VICRYL PLUS CT-1 - 36 INCH (36/BX)

## (undated) DEVICE — MASK ANESTHESIA ADULT  - (100/CA)

## (undated) DEVICE — TRAY SRGPRP PVP IOD WT PRP - (20/CA)

## (undated) DEVICE — GOWN WARMING STANDARD FLEX - (30/CA)

## (undated) DEVICE — PROTECTOR ULNA NERVE - (36PR/CA)

## (undated) DEVICE — KIT SURGIFLO W/OUT THROMBIN - (6EA/CA)

## (undated) DEVICE — PAD SANITARY 11IN MAXI IND WRAPPED  (12EA/PK 24PK/CA)

## (undated) DEVICE — KIT ROOM DECONTAMINATION

## (undated) DEVICE — MASK, LARYNGEAL AIRWAY #4

## (undated) DEVICE — Device

## (undated) DEVICE — ELECTRODE 850 FOAM ADHESIVE - HYDROGEL RADIOTRNSPRNT (50/PK)

## (undated) DEVICE — KIT D & C COLLECTION (10EA/PK)

## (undated) DEVICE — SET LEADWIRE 5 LEAD BEDSIDE DISPOSABLE ECG (1SET OF 5/EA)

## (undated) DEVICE — KIT ANESTHESIA W/CIRCUIT & 3/LT BAG W/FILTER (20EA/CA)

## (undated) DEVICE — CANISTER SUCTION 3000ML MECHANICAL FILTER AUTO SHUTOFF MEDI-VAC NONSTERILE LF DISP  (40EA/CA)

## (undated) DEVICE — SET EXTENSION WITH 2 PORTS (48EA/CA) ***PART #2C8610 IS A SUBSTITUTE*****

## (undated) DEVICE — SUTURE GENERAL

## (undated) DEVICE — GLOVE BIOGEL PI INDICATOR SZ 6.5 SURGICAL PF LF - (50/BX 4BX/CA)

## (undated) DEVICE — SYRINGE 20 ML LL (50EA/BX 4BX/CA)

## (undated) DEVICE — GLOVE BIOGEL SZ 6.5 SURGICAL PF LTX (50PR/BX 4BX/CA)

## (undated) DEVICE — NEPTUNE 4 PORT MANIFOLD - (20/PK)

## (undated) DEVICE — SODIUM CHL IRRIGATION 0.9% 1000ML (12EA/CA)

## (undated) DEVICE — TUBING CLEARLINK DUO-VENT - C-FLO (48EA/CA)

## (undated) DEVICE — HEAD HOLDER JUNIOR/ADULT

## (undated) DEVICE — SET IRRIGATION CYSTOSCOPY TUBE L80 IN (20EA/CA)

## (undated) DEVICE — DRESSING NON-ADHERING 8 X 3 - (50/BX)

## (undated) DEVICE — GLOVE BIOGEL M SZ 8 SURGICAL PF LTX - (50/BX 4BX/CA)

## (undated) DEVICE — GLOVE BIOGEL PI ORTHO SZ 7 PF LF (40PR/BX)

## (undated) DEVICE — SENSOR SPO2 NEO LNCS ADHESIVE (20/BX) SEE USER NOTES